# Patient Record
Sex: FEMALE | Race: BLACK OR AFRICAN AMERICAN | NOT HISPANIC OR LATINO | Employment: FULL TIME | URBAN - METROPOLITAN AREA
[De-identification: names, ages, dates, MRNs, and addresses within clinical notes are randomized per-mention and may not be internally consistent; named-entity substitution may affect disease eponyms.]

---

## 2023-05-19 LAB
ABO GROUP (TYPE) IN BLOOD: NORMAL
ANION GAP IN SER/PLAS: 13 MMOL/L (ref 10–20)
ANTIBODY SCREEN: NORMAL
CALCIUM (MG/DL) IN SER/PLAS: 9.3 MG/DL (ref 8.6–10.6)
CARBON DIOXIDE, TOTAL (MMOL/L) IN SER/PLAS: 25 MMOL/L (ref 21–32)
CHLORIDE (MMOL/L) IN SER/PLAS: 108 MMOL/L (ref 98–107)
CREATININE (MG/DL) IN SER/PLAS: 0.95 MG/DL (ref 0.5–1.05)
ERYTHROCYTE DISTRIBUTION WIDTH (RATIO) BY AUTOMATED COUNT: 13 % (ref 11.5–14.5)
ERYTHROCYTE MEAN CORPUSCULAR HEMOGLOBIN CONCENTRATION (G/DL) BY AUTOMATED: 33.2 G/DL (ref 32–36)
ERYTHROCYTE MEAN CORPUSCULAR VOLUME (FL) BY AUTOMATED COUNT: 88 FL (ref 80–100)
ERYTHROCYTES (10*6/UL) IN BLOOD BY AUTOMATED COUNT: 4.29 X10E12/L (ref 4–5.2)
GFR FEMALE: 82 ML/MIN/1.73M2
GLUCOSE (MG/DL) IN SER/PLAS: 85 MG/DL (ref 74–99)
HEMATOCRIT (%) IN BLOOD BY AUTOMATED COUNT: 37.7 % (ref 36–46)
HEMOGLOBIN (G/DL) IN BLOOD: 12.5 G/DL (ref 12–16)
INR IN PPP BY COAGULATION ASSAY: 1 (ref 0.9–1.1)
LEUKOCYTES (10*3/UL) IN BLOOD BY AUTOMATED COUNT: 6.6 X10E9/L (ref 4.4–11.3)
NRBC (PER 100 WBCS) BY AUTOMATED COUNT: 0 /100 WBC (ref 0–0)
PLATELETS (10*3/UL) IN BLOOD AUTOMATED COUNT: 394 X10E9/L (ref 150–450)
POTASSIUM (MMOL/L) IN SER/PLAS: 4.1 MMOL/L (ref 3.5–5.3)
PROTHROMBIN TIME (PT) IN PPP BY COAGULATION ASSAY: 11.8 SEC (ref 9.8–13.4)
RH FACTOR: NORMAL
SODIUM (MMOL/L) IN SER/PLAS: 142 MMOL/L (ref 136–145)
UREA NITROGEN (MG/DL) IN SER/PLAS: 10 MG/DL (ref 6–23)

## 2023-05-23 ENCOUNTER — HOSPITAL ENCOUNTER (OUTPATIENT)
Dept: DATA CONVERSION | Facility: HOSPITAL | Age: 33
End: 2023-05-23
Attending: SURGERY | Admitting: SURGERY
Payer: COMMERCIAL

## 2023-05-23 DIAGNOSIS — Z85.3 PERSONAL HISTORY OF MALIGNANT NEOPLASM OF BREAST: ICD-10-CM

## 2023-05-23 DIAGNOSIS — E66.9 OBESITY, UNSPECIFIED: ICD-10-CM

## 2023-05-23 DIAGNOSIS — K21.9 GASTRO-ESOPHAGEAL REFLUX DISEASE WITHOUT ESOPHAGITIS: ICD-10-CM

## 2023-05-23 DIAGNOSIS — Z42.1 ENCOUNTER FOR BREAST RECONSTRUCTION FOLLOWING MASTECTOMY: ICD-10-CM

## 2023-05-23 DIAGNOSIS — G47.33 OBSTRUCTIVE SLEEP APNEA (ADULT) (PEDIATRIC): ICD-10-CM

## 2023-06-02 LAB
COMPLETE PATHOLOGY REPORT: NORMAL
CONVERTED CLINICAL DIAGNOSIS-HISTORY: NORMAL
CONVERTED FINAL DIAGNOSIS: NORMAL
CONVERTED FINAL REPORT PDF LINK TO COPY AND PASTE: NORMAL
CONVERTED GROSS DESCRIPTION: NORMAL

## 2023-09-07 VITALS — BODY MASS INDEX: 36.35 KG/M2 | HEIGHT: 62 IN | WEIGHT: 197.53 LBS

## 2023-09-30 NOTE — H&P
"    History & Physical Reviewed:   Pregnant/Lactating:  ·  Are You Pregnant no (1)   ·  Are You Currently Breastfeeding no (1)     I have reviewed the History and Physical dated:  23-May-2023   History and Physical reviewed and relevant findings noted. Patient examined to review pertinent physical  findings.: No significant changes   Home Medications Reviewed: no changes noted   Allergies Reviewed: no changes noted       ERAS (Enhanced Recovery After Surgery):  ·  ERAS Patient: no     Consent:   COVID-19 Consent:  ·  COVID-19 Risk Consent Surgeon has reviewed key risks related to the risk of eulalio COVID-19 and if they contract COVID-19 what the risks are.       Electronic Signatures:  Galileo Barker)  (Signed 23-May-2023 08:14)   Authored: History & Physical Reviewed, ERAS, Consent,  Note Completion      Last Updated: 23-May-2023 08:14 by Galileo Barker)    References:  1.  Data Referenced From \"Patient Profile - Preop v3\" 22-May-2023 12:03   "

## 2023-10-02 NOTE — OP NOTE
PROCEDURE DETAILS    Preoperative Diagnosis:  Other specified postprocedural state, Z98.890  Encounter for cosmetic surgery, Z41.1    Postoperative Diagnosis:  Other specified postprocedural state, Z98.890  Encounter for cosmetic surgery, Z41.1    Surgeon: Galileo Barker  Resident/Fellow/Other Assistant: Roslyn Bermudez    Procedure:  1.  Bilateral second stage breast reconstruction IDEAL IMPLANT, structured saline breast implant, 610 cc  2.  Bilateral capsulotomy and capsulorrhaphy  3.  Bilateral, 14 x 2 cm abdominal adjacent tissue transfer to reconstruct mammary fold which had descended  4.  Liposuction of bilateral flanks with injection of 160 cc per side into the subcutaneous plane, total 320 cc  5.  Cosmetic abdominoplasty with rectus plication and umbilical transposition  6.  Bilateral multiple level intercostal nerve blocks, for postoperative analgesia not for intraoperative pain control  Anesthesia: Elliott Manzanares  Estimated Blood Loss: 200  Findings: Punctured and deflated left breast tissue expander  Additional Details:       No qualified resident was available and Roslyn Bermudez served as my full and primary assistant for the entire case.      Implants:  Right = 610 cc  IDEAL IMPLANT Structured Breast Implant, S/N: [id number]  Empty Implant Saline = 94 cc  Back-Inner Saline = 346 cc  Front-Outer Lumen Saline = 170cc  Total Implant Volume = 610cc    Left= 610 cc  IDEAL IMPLANT Structured Breast Implant, S/N: [id number]  Empty Implant Saline = 94 cc  Back-Inner Saline = 346 cc  Front-Outer Lumen Saline = 170cc  Total Implant Volume = 610cc      Total amount of tumescent = 2 L  Total amount lipoaspirate = 1 L  Total amount of fat grafted 320 cc        Operative Report:     INDICATION  Kirsty is status post bilateral Ruiz pattern mastectomy, with immediate bilateral prepectoral tissue expander (480-575 cc) and adjacent tissue transfer inferior dermoglandular flaps, performed on July 14, 2022.  The total  fill to date:  Right: 120 cc + 100cc +150 cc 120cc 120 cc = 610 cc  Left: 120 cc + 100cc +150 cc 120cc 120 cc = 610 cc + 150cc = 750cc Plan of Care: -follow up in 1 month to re-discuss abdominoplasty and re-check tissue expanders. She remains interested in moving forward with second stage and performing a cosmetic abdominoplasty  at the same time. She has done an excellent job of losing weight, and I believe we can move forward with.    Previously we performed tissue expansion of approximate 150 cc to gain symmetry, the concern is that she probably does have a silent or slow rupture but she was happy with the right, larger, size and so we performed a fill today to achieve symmetry.    The patient is indicated for the above-stated procedure.       INFORMED CONSENT  Patient was told the risks, benefits, INDICATIONS, contraindications, and alternatives to the procedure. Risks include but not limited to pain, infection, bleeding, hematoma, seroma, injury to neurovascular and tendinous structures, imperfect cosmesis,  umbilical hypoperfusion and loss, asymmetric scar, implant rupture, and need for subsequent surgeries.     The patient demonstrated understanding of these risks and agreed to proceed with surgery.   Advance directives discussed.  Team approach explained.      The patient consented and wished to proceed with the procedure and for medical photography if needed.     PROCEDURAL PAUSE  Prior to the beginning of the procedure, the patient's correct identity, side, site, and procedure to be performed were verified.  The patient was given intravenous antibiotics prior to skin incision.     PROCEDURAL NOTE  Kirsyt was brought to the operative theater and was transferred operating room table.  All bony prominences were well padded, and sequential compression devices were placed to each lower extremity. Patient was then secured with safety straps.    The patient was then placed under IV sedation, and our anesthesia  colleagues administered general endotracheal anesthesia.    We began by marking a abdominoplasty which was marked in the preoperative area.  We marked the transverse incision line 7.5 cm cephalad to the vaginal introitus under stretch.  We then placed tumescent into the bilateral flanks.  We allowed this to sit  and we turned attention to the right breast.  We made a 8 cm incision in the previous IMF incision.  We did this with a 15 blade scalpel.  We dissected out with unipolar cautery until he identified and into the capsule.  We then examined the capsule,  we performed capsulotomy capsulorrhaphy with a 610 cc sizer and placed on the right side, the IMF had descended, and so we performed 14 x 2 cm of adjacent tissue transfer and an advancement from the abdominal area in order to tack the IMF.  We did so  with 0 PDS into the periosteum of the ribs.  We then injected 20 cc of Marcaine mixed with Exparel mixed with normal saline into multiple intercostal nerve at the rib periosteum for postoperative analgesia not for intraoperative pain control.  After this  was completed we performed capsulotomy and capsulorrhaphy as mentioned using popcorn capsulorrhaphy technique and 0 PDS suture.    We then turned our attention to the left side, identical IMF incision was made, the dissected down with unipolar cautery, entered the capsule, and identified a implant which was ruptured and approximately 5 tiny puncture areas which were leaking.  This  caused significant retraction of the capsule, and required significant capsulotomy capsulorrhaphy and procedural service in order to accommodate a 610 cc implant.  We performed this capsulotomy capsulorrhaphy and a popcorn capsulorrhaphy technique, we  widen the pocket laterally and secured this with a 0 PDS suture.  We then performed 14 x 2 cm of advancement of the abdominal tissue and an adjacent tissue transfer, securing it to the rib periosteum with 0 PDS suture.  We then performed  injection of  20 cc of Exparel solution into the multiple intercostal nerves for postoperative analgesia not for intraoperative pain control.    We then performed bilateral liposuction of flanks, we harvested 1 L of Lipo aspirate, using the PureGraft system, we then, according to 's instructions performed rinsing, decanting and removal of your fat, we harvested approximately 400 cc  for injection.  We set this aside.    We then turned our attention to performing abdominoplasty.  We made transverse inferior incision with a 10 blade scalpel, we dissected laterally down at the level of the ASIS and to identify the anterior abdominal wall.  We then began to dissect medially  we identified the SI EV's and clipped them bilaterally.  We took significant time and increased procedural service medially through multiple areas of scar in order to confirm our plane.  We then dissected cephalad, split the abdominal panniculus and began  to dissect out the umbilicus keeping a wide swath of periumbilical fat to incorporate as many perforators as possible.  We then dissected cephalad and a pyramidal fashion to we reached the xiphoid.  We identified a 4 cm rectus diastases.  We performed  rectus plication using interrupted buried figure-of-eight 2-0 Ethibond suture. We then oversewed this with a running 2-0 STRATAFIX suture.  We placed 2, 15 Chinese MARII drains exiting lateral to the incisions.  These were secured with 0 silk suture.  We then set the patient in a reflex position, marked the amount of abdominal apron to be excised, and laid the patient back reperformed to close hemostasis with unipolar and bipolar cautery.  We performed tap blocks with the remaining Exparel solution.   We then set the patient upright and to reflex position, we removed all of the excess skin and subcutaneous tissue with a 10 blade scalpel and unipolar cautery.  We provisionally closed with staples, we used bimanual technique to husam the new  level of  the umbilicus with a kimberly shape pattern marked in the skin.  We then performed closure from midline to the ASIS with buried simple 0 PDS suture in Chuck's.  We then ran 2-0 STRATAFIX suture in the camper's fascia from midline to the lateral  extent of the incision, we turned back and ran this in the deep dermal layer.  We then ran a 3-0 STRATAFIX in the subcuticular layer.  The drains were set to bulb suction, we irrigated the drain tubing with 120 cc of Irrisept solution and allowed this to dwell before starting to bulb suction.  The umbilicus was cut out with  an 11 blade scalpel and bipolar cautery.  The umbilicus was then inset with deep 3-0 Monocryl suture followed by running half.  Mattress 4-0 plain gut suture, Xeroform was then placed into the umbilicus followed by Tegaderm.  Dermabond and Prineo were  placed over the transverse incisions.    We then turned attention to completing the breast reconstruction, we used cannula through the IMF incisions to inject 120 cc of fat into the subcutaneous plane taking care to inject 10 cc over approximately 10 cm, and a crosshatch pattern.  After this  was completed we irrigated with Betadine and then irrigated Irrisept solution.  We prepped the skin with Betadine, and we placed the ideal implants into the prepectoral space, we had filled the back movements before placing, and then filled the front/outer  lumens with 2 170 cc on the prepectoral space.  For a total of 610 cc.  We then closed the capsules with interrupted 2-0 Vicryl suture, we closed the fascia with 2-0 PDS suture.  We then closed the IMF incision with running 3-0 STRATAFIX suture  in the deep dermis which was turned back and run in the subcuticular plane.  Dermabond and Prineo were then placed over the incisions.  Surgical bra was placed, ABDs were placed over the incisions and abdominal binder was placed.      The patient tolerated the procedure well and was awakened from anesthesia  without any difficulties, was transferred to the patient in stable condition, all needle counts were correct.    POST OP PLAN:  Kirsty will remain an outpatient.  She is instructed to empty, strip, record the drain outputs twice daily, she is instructed to shower daily.   We will see her back for follow-up in 1 week.                          Electronic Signatures:  Galileo Barker)  (Signed 23-May-2023 14:20)   Authored: Post-Operative Note, Chart Review, Note Completion      Last Updated: 23-May-2023 14:20 by Galileo Barker)

## 2023-10-12 PROBLEM — B35.3 TINEA PEDIS OF BOTH FEET: Status: ACTIVE | Noted: 2023-10-12

## 2023-10-12 PROBLEM — R05.9 COUGH: Status: ACTIVE | Noted: 2023-10-12

## 2023-10-12 PROBLEM — R51.9 HEADACHE: Status: ACTIVE | Noted: 2023-10-12

## 2023-10-12 PROBLEM — E55.9 VITAMIN D DEFICIENCY: Status: ACTIVE | Noted: 2023-10-12

## 2023-10-12 PROBLEM — G62.9 NEUROPATHY: Status: ACTIVE | Noted: 2023-10-12

## 2023-10-12 PROBLEM — T88.8XXA FLUID COLLECTION AT SURGICAL SITE: Status: ACTIVE | Noted: 2023-10-12

## 2023-10-12 PROBLEM — L73.2 HIDRADENITIS SUPPURATIVA OF LEFT AXILLA: Status: ACTIVE | Noted: 2023-10-12

## 2023-10-12 PROBLEM — R10.814 LLQ ABDOMINAL TENDERNESS: Status: ACTIVE | Noted: 2023-10-12

## 2023-10-12 PROBLEM — R19.00 PELVIC MASS IN FEMALE: Status: ACTIVE | Noted: 2023-10-12

## 2023-10-12 PROBLEM — M54.16 CHRONIC LUMBAR RADICULOPATHY: Status: ACTIVE | Noted: 2023-10-12

## 2023-10-12 PROBLEM — M62.838 MUSCLE SPASM: Status: ACTIVE | Noted: 2023-10-12

## 2023-10-12 PROBLEM — D27.9 OVARIAN CYSTADENOMA: Status: ACTIVE | Noted: 2023-10-12

## 2023-10-12 PROBLEM — K21.9 ESOPHAGEAL REFLUX: Status: ACTIVE | Noted: 2023-10-12

## 2023-10-12 PROBLEM — E66.9 OBESITY: Status: ACTIVE | Noted: 2023-10-12

## 2023-10-12 PROBLEM — R10.2 FEMALE PELVIC PAIN: Status: ACTIVE | Noted: 2023-10-12

## 2023-10-12 PROBLEM — G47.19 EXCESSIVE DAYTIME SLEEPINESS: Status: ACTIVE | Noted: 2023-10-12

## 2023-10-12 PROBLEM — Z97.5 IUD CONTRACEPTION: Status: ACTIVE | Noted: 2023-10-12

## 2023-10-12 PROBLEM — G47.33 OSA (OBSTRUCTIVE SLEEP APNEA): Status: ACTIVE | Noted: 2023-10-12

## 2023-10-12 PROBLEM — H92.02 LEFT EAR PAIN: Status: ACTIVE | Noted: 2023-10-12

## 2023-10-12 PROBLEM — N76.0 BACTERIAL VAGINOSIS: Status: ACTIVE | Noted: 2023-10-12

## 2023-10-12 PROBLEM — H93.13 TINNITUS OF BOTH EARS: Status: ACTIVE | Noted: 2023-10-12

## 2023-10-12 PROBLEM — S86.911A KNEE STRAIN, RIGHT, INITIAL ENCOUNTER: Status: ACTIVE | Noted: 2023-10-12

## 2023-10-12 PROBLEM — M25.511 RIGHT SHOULDER PAIN: Status: ACTIVE | Noted: 2023-10-12

## 2023-10-12 PROBLEM — M25.551 RIGHT HIP PAIN: Status: ACTIVE | Noted: 2023-10-12

## 2023-10-12 PROBLEM — R35.0 URINARY FREQUENCY: Status: ACTIVE | Noted: 2023-10-12

## 2023-10-12 PROBLEM — R20.0 NUMBNESS: Status: ACTIVE | Noted: 2023-10-12

## 2023-10-12 PROBLEM — N64.4 BREAST PAIN: Status: ACTIVE | Noted: 2023-10-12

## 2023-10-12 PROBLEM — G56.00 CARPAL TUNNEL SYNDROME: Status: ACTIVE | Noted: 2023-10-12

## 2023-10-12 PROBLEM — D22.9 NEVUS: Status: ACTIVE | Noted: 2023-10-12

## 2023-10-12 PROBLEM — F32.A DEPRESSION: Status: ACTIVE | Noted: 2023-10-12

## 2023-10-12 PROBLEM — N92.6 IRREGULAR MENSTRUAL CYCLE: Status: ACTIVE | Noted: 2023-10-12

## 2023-10-12 PROBLEM — J02.9 ACUTE PHARYNGITIS: Status: ACTIVE | Noted: 2023-10-12

## 2023-10-12 PROBLEM — G47.00 INSOMNIA: Status: ACTIVE | Noted: 2023-10-12

## 2023-10-12 PROBLEM — R00.2 PALPITATIONS: Status: ACTIVE | Noted: 2023-10-12

## 2023-10-12 PROBLEM — G89.18 ACUTE POST-OPERATIVE PAIN: Status: ACTIVE | Noted: 2023-10-12

## 2023-10-12 PROBLEM — F41.9 ANXIETY: Status: ACTIVE | Noted: 2023-10-12

## 2023-10-12 PROBLEM — B96.89 BACTERIAL VAGINOSIS: Status: ACTIVE | Noted: 2023-10-12

## 2023-10-12 PROBLEM — R45.4 IRRITABILITY: Status: ACTIVE | Noted: 2023-10-12

## 2023-10-17 ENCOUNTER — OFFICE VISIT (OUTPATIENT)
Dept: PRIMARY CARE | Facility: CLINIC | Age: 33
End: 2023-10-17
Payer: COMMERCIAL

## 2023-10-17 ENCOUNTER — LAB (OUTPATIENT)
Dept: LAB | Facility: LAB | Age: 33
End: 2023-10-17
Payer: COMMERCIAL

## 2023-10-17 VITALS
HEIGHT: 62 IN | WEIGHT: 214.6 LBS | OXYGEN SATURATION: 96 % | HEART RATE: 81 BPM | BODY MASS INDEX: 39.49 KG/M2 | RESPIRATION RATE: 18 BRPM | SYSTOLIC BLOOD PRESSURE: 110 MMHG | DIASTOLIC BLOOD PRESSURE: 74 MMHG

## 2023-10-17 DIAGNOSIS — Z85.3 HISTORY OF MALIGNANT NEOPLASM OF BREAST: ICD-10-CM

## 2023-10-17 DIAGNOSIS — Z00.00 HEALTHCARE MAINTENANCE: Primary | ICD-10-CM

## 2023-10-17 DIAGNOSIS — Z00.00 HEALTHCARE MAINTENANCE: ICD-10-CM

## 2023-10-17 PROBLEM — R53.83 FATIGUE: Status: ACTIVE | Noted: 2023-07-19

## 2023-10-17 PROBLEM — B35.1 ONYCHOMYCOSIS OF TOENAIL: Status: ACTIVE | Noted: 2023-07-19

## 2023-10-17 LAB
ALBUMIN SERPL BCP-MCNC: 4.2 G/DL (ref 3.4–5)
ALP SERPL-CCNC: 88 U/L (ref 33–110)
ALT SERPL W P-5'-P-CCNC: 15 U/L (ref 7–45)
ANION GAP SERPL CALC-SCNC: 11 MMOL/L (ref 10–20)
AST SERPL W P-5'-P-CCNC: 14 U/L (ref 9–39)
BASOPHILS # BLD AUTO: 0.03 X10*3/UL (ref 0–0.1)
BASOPHILS NFR BLD AUTO: 0.3 %
BILIRUB SERPL-MCNC: 0.5 MG/DL (ref 0–1.2)
BUN SERPL-MCNC: 9 MG/DL (ref 6–23)
CALCIUM SERPL-MCNC: 9.8 MG/DL (ref 8.6–10.6)
CHLORIDE SERPL-SCNC: 107 MMOL/L (ref 98–107)
CHOLEST SERPL-MCNC: 175 MG/DL (ref 0–199)
CHOLESTEROL/HDL RATIO: 3.2
CO2 SERPL-SCNC: 26 MMOL/L (ref 21–32)
CREAT SERPL-MCNC: 0.7 MG/DL (ref 0.5–1.05)
EOSINOPHIL # BLD AUTO: 0.17 X10*3/UL (ref 0–0.7)
EOSINOPHIL NFR BLD AUTO: 2 %
ERYTHROCYTE [DISTWIDTH] IN BLOOD BY AUTOMATED COUNT: 14.1 % (ref 11.5–14.5)
EST. AVERAGE GLUCOSE BLD GHB EST-MCNC: 100 MG/DL
GFR SERPL CREATININE-BSD FRML MDRD: >90 ML/MIN/1.73M*2
GLUCOSE SERPL-MCNC: 103 MG/DL (ref 74–99)
HBA1C MFR BLD: 5.1 %
HCT VFR BLD AUTO: 41.7 % (ref 36–46)
HDLC SERPL-MCNC: 54 MG/DL
HGB BLD-MCNC: 13.9 G/DL (ref 12–16)
IMM GRANULOCYTES # BLD AUTO: 0.04 X10*3/UL (ref 0–0.7)
IMM GRANULOCYTES NFR BLD AUTO: 0.5 % (ref 0–0.9)
LDLC SERPL CALC-MCNC: 100 MG/DL
LYMPHOCYTES # BLD AUTO: 2.35 X10*3/UL (ref 1.2–4.8)
LYMPHOCYTES NFR BLD AUTO: 27.1 %
MCH RBC QN AUTO: 29.3 PG (ref 26–34)
MCHC RBC AUTO-ENTMCNC: 33.3 G/DL (ref 32–36)
MCV RBC AUTO: 88 FL (ref 80–100)
MONOCYTES # BLD AUTO: 0.45 X10*3/UL (ref 0.1–1)
MONOCYTES NFR BLD AUTO: 5.2 %
NEUTROPHILS # BLD AUTO: 5.64 X10*3/UL (ref 1.2–7.7)
NEUTROPHILS NFR BLD AUTO: 64.9 %
NON HDL CHOLESTEROL: 121 MG/DL (ref 0–149)
NRBC BLD-RTO: 0 /100 WBCS (ref 0–0)
PLATELET # BLD AUTO: 438 X10*3/UL (ref 150–450)
PMV BLD AUTO: 10 FL (ref 7.5–11.5)
POTASSIUM SERPL-SCNC: 4.2 MMOL/L (ref 3.5–5.3)
PROT SERPL-MCNC: 7.2 G/DL (ref 6.4–8.2)
RBC # BLD AUTO: 4.75 X10*6/UL (ref 4–5.2)
SODIUM SERPL-SCNC: 140 MMOL/L (ref 136–145)
TRIGL SERPL-MCNC: 103 MG/DL (ref 0–149)
TSH SERPL-ACNC: 0.78 MIU/L (ref 0.44–3.98)
VLDL: 21 MG/DL (ref 0–40)
WBC # BLD AUTO: 8.7 X10*3/UL (ref 4.4–11.3)

## 2023-10-17 PROCEDURE — 1036F TOBACCO NON-USER: CPT | Performed by: INTERNAL MEDICINE

## 2023-10-17 PROCEDURE — 84443 ASSAY THYROID STIM HORMONE: CPT

## 2023-10-17 PROCEDURE — 85025 COMPLETE CBC W/AUTO DIFF WBC: CPT

## 2023-10-17 PROCEDURE — 80053 COMPREHEN METABOLIC PANEL: CPT

## 2023-10-17 PROCEDURE — 36415 COLL VENOUS BLD VENIPUNCTURE: CPT

## 2023-10-17 PROCEDURE — 83036 HEMOGLOBIN GLYCOSYLATED A1C: CPT

## 2023-10-17 PROCEDURE — 3008F BODY MASS INDEX DOCD: CPT | Performed by: INTERNAL MEDICINE

## 2023-10-17 PROCEDURE — 80061 LIPID PANEL: CPT

## 2023-10-17 PROCEDURE — 99214 OFFICE O/P EST MOD 30 MIN: CPT | Performed by: INTERNAL MEDICINE

## 2023-10-17 RX ORDER — LEVONORGESTREL 52 MG/1
1 INTRAUTERINE DEVICE INTRAUTERINE
COMMUNITY
Start: 2018-02-21

## 2023-10-17 RX ORDER — TAMOXIFEN CITRATE 20 MG/1
1 TABLET ORAL DAILY
COMMUNITY
Start: 2022-08-18 | End: 2024-03-08 | Stop reason: ALTCHOICE

## 2023-10-17 ASSESSMENT — ENCOUNTER SYMPTOMS
SHORTNESS OF BREATH: 0
NECK PAIN: 0
WEAKNESS: 0
HEADACHES: 0
VOMITING: 0
BLOOD IN STOOL: 0
FREQUENCY: 0
FEVER: 0
MYALGIAS: 0
NECK STIFFNESS: 0
DIFFICULTY URINATING: 0
WHEEZING: 0
FATIGUE: 0
DYSURIA: 0
HEMATURIA: 0
DIARRHEA: 0
APPETITE CHANGE: 0
BACK PAIN: 0
DIZZINESS: 0
JOINT SWELLING: 0
NUMBNESS: 0
CHILLS: 0
NAUSEA: 0
ABDOMINAL PAIN: 0
ARTHRALGIAS: 0
LIGHT-HEADEDNESS: 0
RHINORRHEA: 0
ABDOMINAL DISTENTION: 0
PALPITATIONS: 0
SINUS PRESSURE: 0
SORE THROAT: 0
DIAPHORESIS: 0
COUGH: 0
CONSTIPATION: 0

## 2023-10-17 NOTE — ASSESSMENT & PLAN NOTE
Currently on tamoxifen 20 mg daily but reports not taking it as prescribed.  Keep follow-up with breast surgery/ oncology.

## 2023-10-17 NOTE — PROGRESS NOTES
"Subjective   Patient ID: Kirsty Shrestha is a 33 y.o. female who presents for [facility].    HPI   She is a 34 yo female with a PMHx of breast cancer [estrogen receptor/progesterone receptor positive, HER2 negative on tamoxifen] status post bilateral mastectomy followed by breast reconstructive surgery in May 2023.  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  She presents to [facility].  She is doing well generally, moods are normal.    Review of Systems   Constitutional:  Negative for appetite change, chills, diaphoresis, fatigue and fever.   HENT:  Negative for congestion, ear discharge, ear pain, hearing loss, postnasal drip, rhinorrhea, sinus pressure, sore throat and tinnitus.    Eyes:  Negative for visual disturbance.   Respiratory:  Negative for cough, shortness of breath and wheezing.    Cardiovascular:  Negative for chest pain, palpitations and leg swelling.   Gastrointestinal:  Negative for abdominal distention, abdominal pain, blood in stool, constipation, diarrhea, nausea and vomiting.   Genitourinary:  Negative for decreased urine volume, difficulty urinating, dysuria, frequency, hematuria and urgency.   Musculoskeletal:  Negative for arthralgias, back pain, gait problem, joint swelling, myalgias, neck pain and neck stiffness.   Skin:  Negative for rash.   Neurological:  Negative for dizziness, weakness, light-headedness, numbness and headaches.       Objective   /74 Comment: ida bp  Pulse 81   Resp 18   Ht 1.575 m (5' 2\")   Wt 97.3 kg (214 lb 9.6 oz)   SpO2 96%   BMI 39.25 kg/m²     Physical Exam  Vitals reviewed.   Constitutional:       Appearance: Normal appearance. She is normal weight.   HENT:      Right Ear: Tympanic membrane and external ear normal.      Left Ear: Tympanic membrane and external ear normal.   Eyes:      Extraocular Movements: Extraocular movements intact.      Conjunctiva/sclera: Conjunctivae normal.      Pupils: Pupils " are equal, round, and reactive to light.   Cardiovascular:      Rate and Rhythm: Normal rate and regular rhythm.      Pulses: Normal pulses.   Pulmonary:      Effort: Pulmonary effort is normal.      Breath sounds: Normal breath sounds.   Abdominal:      General: Bowel sounds are normal.      Palpations: Abdomen is soft.   Musculoskeletal:         General: Normal range of motion.      Cervical back: Normal range of motion.   Skin:     General: Skin is warm and dry.   Neurological:      General: No focal deficit present.      Mental Status: She is oriented to person, place, and time.   Psychiatric:         Mood and Affect: Mood normal.         Behavior: Behavior normal.         Assessment/Plan   Problem List Items Addressed This Visit             ICD-10-CM    History of malignant neoplasm of breast Z85.3     Currently on tamoxifen 20 mg daily but reports not taking it as prescribed.  Keep follow-up with breast surgery/ oncology.           Other Visit Diagnoses         Codes    Healthcare maintenance    -  Primary Z00.00    Relevant Orders    CBC and Auto Differential    Comprehensive Metabolic Panel    Hemoglobin A1C    Lipid Panel    TSH with reflex to Free T4 if abnormal    Referral to Gynecology        RTC in 6 mo, sooner if needed

## 2023-12-18 ENCOUNTER — OFFICE VISIT (OUTPATIENT)
Dept: PLASTIC SURGERY | Facility: CLINIC | Age: 33
End: 2023-12-18
Payer: COMMERCIAL

## 2023-12-18 VITALS
SYSTOLIC BLOOD PRESSURE: 120 MMHG | HEART RATE: 70 BPM | HEIGHT: 62 IN | DIASTOLIC BLOOD PRESSURE: 65 MMHG | WEIGHT: 211 LBS | BODY MASS INDEX: 38.83 KG/M2

## 2023-12-18 DIAGNOSIS — N60.02 BENIGN CYST OF LEFT BREAST: Primary | ICD-10-CM

## 2023-12-18 PROCEDURE — 3008F BODY MASS INDEX DOCD: CPT | Performed by: PHYSICIAN ASSISTANT

## 2023-12-18 PROCEDURE — 99213 OFFICE O/P EST LOW 20 MIN: CPT | Performed by: PHYSICIAN ASSISTANT

## 2023-12-18 PROCEDURE — 1036F TOBACCO NON-USER: CPT | Performed by: PHYSICIAN ASSISTANT

## 2023-12-18 NOTE — PROGRESS NOTES
Department of Plastic and Reconstructive Surgery            Follow Up Visit    Date: 12/18/23  Date of Surgery: 5/23/25    Subjective   Kirsty Shrestha is a 33 y.o. female who presents for POV. They are s/p bilateral stage 2 breast recosntruction with removal of TE and placement of new IDEA: saline implants 610cc with autologous fat grafting of 160cc per breast and abdominoplasty with rectus plication and umbilical transposition on 5/23/23 with Dr. Barker.       She presents for follow up visit. She has concerns for cyst on her left breast, she states that this is non-tender but endorses that this is lump becomes apparent through the ski and when she wears clothing. She additionally notes hollowing of the superior medial aspect of the breast and would like to undergo fat grafting.         Objective   Vital Signs:   Vitals:    12/18/23 1100   BP: 120/65   Pulse: 70     Gen: interactive and pleasant  Head: NCAT  Eyes: EOMI, PERRLA  Mouth: MMM  Throat: trachea midline  Cor: RRR  Pulm: nonlabored breathing  Abd: s/nt/nd  Neuro: AAOx3  Ext: extremities perfused    Focused exam of the: breast    B/L breasts with excellent symmetry. There is mild superior medial hollowing noted. Left breast has large cyst like lesion at the superior portion of the breast, this is rubbery and mobile. Non-tender to palpation. Likely fat necrosis. Surgical scars are well healed.     Assessment/Plan     Kirsty Shrestha is a 33 y.o. female who presents for POV. They are s/p bilateral stage 2 breast recosntruction with removal of TE and placement of new IDEA: saline implants 610cc with autologous fat grafting of 160cc per breast and abdominoplasty with rectus plication and umbilical transposition on 5/23/23 with Dr. Barker.       She presents today for follow up visit. She has complaints of mild superior medial hollowing. She was seen by myself and Dr. Barker today. We discussed fat grafting to the B/l breasts. She  likely has an oil cyst from previous liposuction of the left breast, we will order US to further evaluate this left breast mass. We discussed possible liposuction of the cyst for removal.      Plan:   US left breast for cyst  Follow up after US with Dr. Mj SNYDER spent 20 minutes with this patient. Greater than 50% of this time was spent in the counselling and/or coordination of care of this patient.  This note was created using voice recognition software and was not corrected for typographical or grammatical errors.    Signature: Roslyn Bermudez PA-C  Date: 12/18/23

## 2023-12-21 ENCOUNTER — ANCILLARY PROCEDURE (OUTPATIENT)
Dept: RADIOLOGY | Facility: CLINIC | Age: 33
End: 2023-12-21
Payer: COMMERCIAL

## 2023-12-21 ENCOUNTER — OFFICE VISIT (OUTPATIENT)
Dept: SURGICAL ONCOLOGY | Facility: CLINIC | Age: 33
End: 2023-12-21
Payer: COMMERCIAL

## 2023-12-21 VITALS
HEART RATE: 67 BPM | SYSTOLIC BLOOD PRESSURE: 136 MMHG | WEIGHT: 211 LBS | BODY MASS INDEX: 38.83 KG/M2 | HEIGHT: 62 IN | DIASTOLIC BLOOD PRESSURE: 87 MMHG

## 2023-12-21 DIAGNOSIS — R92.8 ABNORMAL FINDING ON BREAST IMAGING: Primary | ICD-10-CM

## 2023-12-21 DIAGNOSIS — N60.02 BENIGN CYST OF LEFT BREAST: ICD-10-CM

## 2023-12-21 PROCEDURE — 3008F BODY MASS INDEX DOCD: CPT | Performed by: NURSE PRACTITIONER

## 2023-12-21 PROCEDURE — 1036F TOBACCO NON-USER: CPT | Performed by: NURSE PRACTITIONER

## 2023-12-21 PROCEDURE — 99214 OFFICE O/P EST MOD 30 MIN: CPT | Performed by: NURSE PRACTITIONER

## 2023-12-21 PROCEDURE — 76982 USE 1ST TARGET LESION: CPT | Mod: LT

## 2023-12-21 PROCEDURE — 76642 ULTRASOUND BREAST LIMITED: CPT | Mod: LT

## 2023-12-21 PROCEDURE — 76642 ULTRASOUND BREAST LIMITED: CPT | Mod: LEFT SIDE | Performed by: STUDENT IN AN ORGANIZED HEALTH CARE EDUCATION/TRAINING PROGRAM

## 2023-12-21 RX ORDER — FEXOFENADINE HCL AND PSEUDOEPHEDRINE HCI 180; 240 MG/1; MG/1
1 TABLET, EXTENDED RELEASE ORAL DAILY
COMMUNITY

## 2023-12-21 ASSESSMENT — PAIN SCALES - GENERAL: PAINLEVEL: 0-NO PAIN
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
## 2023-12-22 ENCOUNTER — ANCILLARY PROCEDURE (OUTPATIENT)
Dept: RADIOLOGY | Facility: CLINIC | Age: 33
End: 2023-12-22
Payer: COMMERCIAL

## 2023-12-22 DIAGNOSIS — R92.8 ABNORMAL FINDING ON BREAST IMAGING: ICD-10-CM

## 2023-12-22 DIAGNOSIS — R92.8 ABNORMAL SCREENING MAMMOGRAM: Primary | ICD-10-CM

## 2023-12-22 DIAGNOSIS — R92.8 OTHER ABNORMAL AND INCONCLUSIVE FINDINGS ON DIAGNOSTIC IMAGING OF BREAST: ICD-10-CM

## 2023-12-22 PROCEDURE — 19083 BX BREAST 1ST LESION US IMAG: CPT | Mod: LEFT SIDE | Performed by: RADIOLOGY

## 2023-12-22 PROCEDURE — 77065 DX MAMMO INCL CAD UNI: CPT | Mod: LT

## 2023-12-22 PROCEDURE — 88360 TUMOR IMMUNOHISTOCHEM/MANUAL: CPT | Performed by: PATHOLOGY

## 2023-12-22 PROCEDURE — 76942 ECHO GUIDE FOR BIOPSY: CPT | Mod: 52,LT

## 2023-12-22 PROCEDURE — 88305 TISSUE EXAM BY PATHOLOGIST: CPT | Mod: TC,AHULAB | Performed by: NURSE PRACTITIONER

## 2023-12-22 PROCEDURE — 19083 BX BREAST 1ST LESION US IMAG: CPT | Mod: LT

## 2023-12-22 PROCEDURE — 2720000007 HC OR 272 NO HCPCS

## 2023-12-22 PROCEDURE — 96372 THER/PROPH/DIAG INJ SC/IM: CPT | Performed by: RADIOLOGY

## 2023-12-22 PROCEDURE — 2500000005 HC RX 250 GENERAL PHARMACY W/O HCPCS: Performed by: RADIOLOGY

## 2023-12-22 PROCEDURE — C1819 TISSUE LOCALIZATION-EXCISION: HCPCS

## 2023-12-22 PROCEDURE — 2780000003 HC OR 278 NO HCPCS

## 2023-12-22 PROCEDURE — 88305 TISSUE EXAM BY PATHOLOGIST: CPT | Performed by: PATHOLOGY

## 2023-12-22 PROCEDURE — 77065 DX MAMMO INCL CAD UNI: CPT | Mod: LEFT SIDE | Performed by: RADIOLOGY

## 2023-12-22 PROCEDURE — 76882 US LMTD JT/FCL EVL NVASC XTR: CPT | Mod: LEFT SIDE | Performed by: RADIOLOGY

## 2023-12-22 RX ADMIN — Medication 20 ML: at 13:06

## 2023-12-22 ASSESSMENT — PAIN SCALES - GENERAL
PAINLEVEL_OUTOF10: 0 - NO PAIN
PAINLEVEL_OUTOF10: 3

## 2023-12-22 NOTE — Clinical Note
Procedural steps explained and patient given opportunity to verbalize concerns and seek clarification.  Post procedure self-care and potential for bruising , hematoma, and pain reviewed.  Patient verbalizes understanding.

## 2023-12-22 NOTE — Clinical Note
Pressure held x 10 minutes, incision dry, steri strips intact and compression dressing applied. Ice pack applied.

## 2023-12-22 NOTE — PATIENT INSTRUCTIONS
Thank you for coming to see me today at [facility]. I recommend biopsy. A breast radiology physician will perform the biopsy. Possible diagnoses include benign, atypical, or cancer as we discussed.  Bruising and mild discomfort after the biopsy is normal and will improve. I normally have results in 7 business days. I will call you with results, please have your phone handy to take my call.    IMPORTANT INFORMATION REGARDING YOUR RESULTS  If you receive medical information from My [facility] Personal Health Record (online chart) your results will be released into your chart. This means you may view or see results of your biopsy or procedure before I contact you directly. If this occurs, please call the office and we will discuss your results over the phone.     You can see your health information, review clinical summaries from office visits & test results online when you follow your health with MY  Chart, a personal health record. To sign up go to [email or web address]. If you need assistance with signing up or trouble getting into your account call [employer] Patient Line 24/7 at [phone].     Should you have any questions or concerns after biopsy, please do not hesitate to call my office at [phone]. If it has been more than a week since your biopsy was performed and you have not been given the results, please call my office [phone]. Thank you for choosing [facility] and trusting me as your healthcare provider. I am honored to be a provider on your health care team and I remain dedicated to helping you achieve your health goals.    
home w/ home PT

## 2023-12-22 NOTE — DISCHARGE INSTRUCTIONS
AFTER THE TEST  A steri-strip and bandage will be placed over the incision. You may shower after 24 hours. Remove bandage after 24 hours. Remove bandage after the shower. Leave the steri-strips in place to fall off on their own. If after 1 week the steri-strips are still on, you may remove them. Avoid swimming or soaking in tub for 3 days.     You may have mild discomfort at the test site. If needed, you may take Tylenol (Acetaminophen) for pain. Please avoid taking NSAIDs, Motrin, Advil, Aleve, or ibuprofen for 24 hours following the biopsy. After 24 hours you may resume NSAIDSs.     If you take aspirin, Plavix, Coumadin, Xarelto or Eliquis please tell us. If these medications were stopped by your provider, please ask them when to resume.     You may have some tenderness, bruising or slight bleeding at the site. Please apply ice packs to the site for 15 minutes on and 15 minutes off for a 2 hour minimum.     Most people can return to their usual routine after the procedure. Avoid Strenuous activity for 24 hours.     Sleep in a bra the night after your biopsy. Continue to do so for comfort.     Call your doctor if you have any of the following symptoms :  Fever  Increased pain  Increased bleeding  Redness  Increased swelling  Yellowish drainage  Your doctor will get the biopsy results within 5 - 7 days. Call your doctor with any questions.     Patient education brochure and pain/comfort measures have been reviewed.   Phone number provided to contact Breast Center if problems arise.     Patient verbalized understanding of home going instructions.   Patient left breast center ambulatory.

## 2023-12-27 ENCOUNTER — TELEPHONE (OUTPATIENT)
Dept: SURGERY | Facility: HOSPITAL | Age: 33
End: 2023-12-27
Payer: COMMERCIAL

## 2023-12-27 DIAGNOSIS — C50.912 RECURRENT MALIGNANT NEOPLASM OF LEFT BREAST (MULTI): Primary | ICD-10-CM

## 2023-12-27 NOTE — TELEPHONE ENCOUNTER
"Result Communication    Resulted Orders   Surgical Pathology Exam   Result Value Ref Range    Case Report       Surgical Pathology                                Case: [id number]                                  Authorizing Provider:  PUSHPA Murcia-VIVIENNE  Collected:           12/22/2023 1329              Ordering Location:     [facility]       Received:            12/22/2023 1559                                     Center                                                                       Pathologist:           Nannette Joseph MD                                                         Specimen:    BREAST CORE BIOPSY LEFT, Left Breast Palp 12:00                                            FINAL DIAGNOSIS       A.  Left breast mass palpable 12:00, ultrasound guided core needle biopsy:  -- Invasive ductal carcinoma, grade 3, see note.    Note:  In this limited sample, the invasive carcinoma measures up to 5 mm in greatest dimension.  ER, AK and HER2 will be reported in an addendum.                By the signature on this report, the individual or group listed as making the Final Interpretation/Diagnosis certifies that they have reviewed this case.       Gross Description       A: Received in formalin, labeled with the patient´s name and hospital number and \"left breast palp 12:00\", are multiple irregular/cylindrical segments of yellow-white fatty soft tissue aggregating to 1.0 x 1.0 x 0.2 cm.  The specimen is submitted in toto in two cassettes.  [facility]    NOTE:  Ischemia time: Not provided.  This specimen was placed into formalin at: 12/22/23  13:29.      Disclaimer       One or more of the reagents used to perform assays on this specimen MAY have contained components considered to be analyte specific reagents (ASR's).  ASR's have not been cleared or approved by the U.S. Food and Drug Administration.  These assays were developed and their performance characteristics determined by the Department of " Pathology at [facility]. The FDA does not require this test to go through premarket FDA review. This test is used for clinical purposes. It should not be regarded as investigational or for research. This laboratory is certified under the Clinical Laboratory Improvement Amendments (CLIA) as qualified to perform high complexity clinical laboratory testing.  The assays were performed with appropriate positive and negative controls which stained appropriately.         11:21 AM      Results were successfully communicated with the patient and they acknowledged their understanding. Informed left chest wall biopsy shows IDC recurrence. She will return for appt with Dr Pugh

## 2023-12-27 NOTE — TELEPHONE ENCOUNTER
Result Communication    Resulted Orders   BI US guided breast localization and biopsy left    Addendum: 12/27/2023    Interpreted By:  Briana Bauer,   ADDENDUM:  The final pathology for palpable complex solid and cystic mass, 12  o'clock position left reconstructed breast is invasive ductal  carcinoma grade 3. This malignant result is concordant.      Estimated size of mass/extent of disease: Mass measures 4.1 x 1.7 x  3.5 cm on ultrasound 12/21/2023..      Ipsilateral lymph nodes: The patient has had left axillary lymph  nodes removed. A single persisting axillary lymph node with  borderline cortical thickening but maintain fatty hilum could not be  biopsied given the deep location. Chest CT or breast MRI recommended  for further evaluation.      MRI recommendation: At the discretion of the referring surgeon.      Critical Finding:  See findings. Notification was initiated on  12/27/2023 at 11:09 am by  Briana Bauer.  (**-YCF-**) Instructions:          Signed by: Briana Bauer 12/27/2023 11:09 AM      -------- ORIGINAL REPORT --------  Dictation workstation:   [id number]      Narrative    Interpreted By:  Briana Bauer,   STUDY:  BI US GUIDED BREAST LOCALIZATION AND BIOPSY LEFT; BI BREAST BIOPSY  CLIP IMAGING;  12/22/2023 2:32 pm; 12/22/2023 2:46 pm      ACCESSION NUMBER(S):  [id number]; [id number]      ORDERING CLINICIAN:  AJAY GUARDADO      INDICATION:  Palpable solid and cystic left breast mass, 12 o'clock position of  mastectomy bed within the reconstructed left breast. Single  indeterminate left axillary lymph node.      FINDINGS:  PREPROCEDURAL CONSULTATION: The history and physical exam pertinent  to the procedure were reviewed and no updates were made.      The procedure was explained to the patient including the risks,  benefits, and alternatives. Medications were discussed, including any  prior use of blood thinning medications by the patient. Patient  allergies were reviewed. The risks, including but  not limited to  infection and bleeding, were reviewed by the performing physician and  the patient agreed to undergo the procedure.      Prior to the procedure, an audible timeout was done to verify patient  identification, site and type of procedure. Dr. Bauer, a radiology  nurse and an ultrasound technologist were present.      PROCEDURE: Scanning of the left breast redemonstrated the mass of  concern in the 12 o'clock position. The left breast was marked under  ultrasound guidance and cleansed.  10 mL of buffered 1% lidocaine was  injected subcutaneously and then into the deeper tissues surrounding  the mass. A small incision was made.  3 tissue core specimens were  then obtained with a 14-gauge spring-loaded biopsy needle with  minimal bleeding (estimated blood loss less than 10 mL).  A open coil  Hydromark tissue marker was then placed into the biopsy site.  Hemostasis was achieved with manual compression. The patient  tolerated the procedure without difficulty.      Thereafter, extensive sonographic scanning of the left axilla was  performed. A single oval circumscribed mass with hyperechoic center  was visualized in the deep tissue. There is no internal Doppler blood  flow to the hilum. The skin was cleansed and 1% lidocaine was  administered. After administering lidocaine, the biopsy target was  not well seen. A 14 gauge needle was advanced into the tissue but the  targeted could not be reliably reproduced. The target could not be  seen for biopsied Norco we placed a clip due to lack of visualization.          The postbiopsy mammogram demonstrates satisfactory placement of the  open coil tissue marker in the central aspect of the craniocaudal  view..      The patient experienced no complications during the procedure.  Home-going/follow-up instructions were reviewed with the patient  before she left the department.        Impression    Status post ultrasound guided core needle biopsy of a left breast  mass  followed by tissue marker placement. Pathology is pending.      Biopsy of left axillary lymph node not performed due to lack of  visualization after the administration of lidocaine. Pending  pathology, cross-sectional imaging with MRI recommended.      POST PROCEDURE MAMMOGRAM FOR MARKER PLACEMENT.      MACRO:  None      Signed by: Briana Bauer 12/22/2023 3:03 PM  Dictation workstation:   [id number]       11:38 AM      Results were successfully communicated with the patient and they acknowledged their understanding. Informed results IDC, she will return to see Dr Pugh

## 2023-12-29 LAB
LAB AP ASR DISCLAIMER: NORMAL
LABORATORY COMMENT REPORT: NORMAL
PATH REPORT.ADDENDUM SPEC: NORMAL
PATH REPORT.FINAL DX SPEC: NORMAL
PATH REPORT.GROSS SPEC: NORMAL
PATH REPORT.TOTAL CANCER: NORMAL

## 2024-01-10 ENCOUNTER — HOSPITAL ENCOUNTER (OUTPATIENT)
Dept: RADIOLOGY | Facility: HOSPITAL | Age: 34
Discharge: HOME | End: 2024-01-10
Payer: COMMERCIAL

## 2024-01-10 ENCOUNTER — TELEPHONE (OUTPATIENT)
Dept: SURGERY | Facility: HOSPITAL | Age: 34
End: 2024-01-10
Payer: COMMERCIAL

## 2024-01-10 DIAGNOSIS — C50.912: ICD-10-CM

## 2024-01-10 DIAGNOSIS — Z87.821 PERSONAL HISTORY OF RETAINED FOREIGN BODY FULLY REMOVED: ICD-10-CM

## 2024-01-10 DIAGNOSIS — C50.912 RECURRENT MALIGNANT NEOPLASM OF LEFT BREAST (MULTI): ICD-10-CM

## 2024-01-10 PROCEDURE — 77049 MRI BREAST C-+ W/CAD BI: CPT | Performed by: RADIOLOGY

## 2024-01-10 PROCEDURE — 2550000001 HC RX 255 CONTRASTS: Performed by: NURSE PRACTITIONER

## 2024-01-10 PROCEDURE — 71045 X-RAY EXAM CHEST 1 VIEW: CPT

## 2024-01-10 PROCEDURE — 77049 MRI BREAST C-+ W/CAD BI: CPT

## 2024-01-10 PROCEDURE — A9575 INJ GADOTERATE MEGLUMI 0.1ML: HCPCS | Performed by: NURSE PRACTITIONER

## 2024-01-10 RX ORDER — GADOTERATE MEGLUMINE 376.9 MG/ML
0.2 INJECTION INTRAVENOUS
Status: COMPLETED | OUTPATIENT
Start: 2024-01-10 | End: 2024-01-10

## 2024-01-10 RX ADMIN — GADOTERATE MEGLUMINE 19 ML: 376.9 INJECTION INTRAVENOUS at 10:34

## 2024-01-10 NOTE — PROGRESS NOTES
BREAST SURGICAL ONCOLOGY FOLLOW UP     Subjective   Kirsty Shrestha is a 33 y.o. female presents today for follow up with newly diagnosed recurrent carcinoma of the left breast.   She had a ultrasound-guided core biopsy of a palpable 12:00 left breast mass on December 22, 2023.  This was a conglomerate of solid and cystic masses measuring up to 4.1 cm.  Pathology showed: Invasive ductal carcinoma, grade 3; ER greater than 95%, NJ greater than 95%, HER2 negative  Breast MRI done on January 10, 2024: Impression  1. Known malignancy in the left breast measuring 3.8 cm. Surgical  consultation is recommended.  2. Indeterminate left axillary lymph node. Repeat biopsy or sentinel  lymph node biopsy is recommended. A preprocedure form has been  completed.  3. Nonspecific internal mammary lymph node on the left.  4. No evidence of malignancy in the right reconstructed breast.        Cancer History:   Treatment Synopsis:    Diagnosis: Invasive ductal carcinoma of the left breast; ER >95%, NJ >95%, Her2-negative  Stage: IA (pT1b pN0)  Mammaprint: High-risk, luminal B, -0.615     -Palpable lump in left breast.  -4/8/22: Dx MG/US performed. A 1.3 x 0.6 x 0.7 cm hypoechoic mass seen in left breast (11:00, 13 cm FN). An additional 0.9 cm mass at 2:00 (8 cm FN) was also seen, possibly benign. ALN’s appeared unremarkable.  -4/20/22: Left breast mass biopsy (11:00) showed IDC, grade 3. ER >95%, NJ >95%, Her2-negative (2+ IHC; MAHNAZ ratio 1.9, copy #3.6). Mammaprint high-risk (-0.615), luminal B type. Maximum diameter 0.7 cm.  -4/28/22: Biopsy of 2:00 lesion benign. Left ALN negative.  -7/14/22: Bilateral mastectomy with implant reconstruction and left SLNBx performed. No residual carcinoma seen in left breast. Right breast benign. 0/1 left SLN’s involved. pT1bN0 (IA).          She is here today to discuss these results and develop a treatment plan.      PHYSICAL EXAM:    General: Alert and oriented x 3.  Mood and affect are  appropriate.  Breast: No cervical, supraclavicular or axillary lymphadenopathy.  Bilateral implants in place with well-healed incisions.  On the right side there are no palpable masses.  On the left side there is a 3-1/2 cm palpable mass in the 12 o'clock position which is firm.  No skin changes.      Assessment/Plan     Clinical stage IIA recurrent left breast cancer  [id number]  Invasive ductal carcinoma, grade 3; ER greater than 95%, PA greater than 95%, HER2 negative    Recurrent left breast cancer following a bilateral mastectomy with implant reconstruction.  We plan a wide excision of the mass.  The mass is very close to the skin and very close to her implant.  Skin and implant capsule will be taken.  We have discussed that there is a possibility that the implant would be damaged and may need to be removed and/or replaced at a later time.  There was a left axillary lymph node that looked indeterminant on ultrasound.  It was not able to be core biopsied because of its location.  The MRI shows that the lymph node appears indeterminant.  My plan is to place a Magseed and excise that lymph node at the time of surgery.  I will also attempt a left axillary sentinel lymph node biopsy however, a sentinel lymph node may not be found given that she has had a previous sentinel lymph node biopsy and a mastectomy.    I have discussed with the patient the pathophysiology of the disease process and the rationale for the planned surgery. I have explained the anticipated risks and possible complications, the incidences and consequences of those risks and complications, and the expected postoperative course. Alternatives have been discussed including the alternative of no surgery. The patient has been given the opportunity to ask questions and all her questions have been answered to her satisfaction.     Surgery has been recommended. The risks, benefits and procedure have been reviewed with you today.   You may be scheduled for  pre-surgical testing and detailed instructions will be given to you at that appointment.  The pathology results from your surgery should be available about 7 days after the procedure. I will call you with the results. If you do not hear from me within 5 days, please call the office at [phone] for your results.    Schedule a metastatic workup.  These are CAT scans of the chest, abdomen and pelvis and a bone scan to be sure that the cancer has not spread anywhere else in the body.    Schedule left lumpectomy/wide local excision, left axillary sentinel lymph node biopsy and excision of left axillary lymph node with Magseed localization.    Renee Pugh MD

## 2024-01-10 NOTE — TELEPHONE ENCOUNTER
Result Communication    Resulted Orders   MR breast bilateral w contrast full protocol    Narrative    Interpreted By:  Emelia Malagon,   STUDY:  BI MR BREAST BILATERAL WITH CONTRAST FULL PROTOCOL;  1/10/2024 10:40  am      ACCESSION NUMBER(S):  [id number]      ORDERING CLINICIAN:  AJAY GUARDADO      INDICATION:  Recent diagnosis of a left breast mass at the 12 o'clock position is  invasive ductal carcinoma, grade 3. Biopsy of the left axillary lymph  node was not performed given the deep location and lack of  visualization after lidocaine administration. History of bilateral  breast mastectomy.      History of left breast cancer diagnosed in May 2022 and treated with  bilateral mastectomy. Patient has bilateral saline implants with fat  grafting.      COMPARISON:  12/22/2023, 12/21/2023      TECHNIQUE:  Using a dedicated breast coil, STIR axial and T1-weighted fat  saturation axial images of the breasts were obtained, the latter both  before and after intravenous administration of Gadolinium DTPA. On an  independent workstation, 3-D images were formulated using [employer]  including time enhancement curves, subtraction images and MIP images.      Intravenous contrast:  19 mL of Dotarem      FINDINGS:  There is  symmetric  minimal bilateral background enhancement.  There are bilateral breast saline implants. The study is limited  secondary to motion. Density:  The breast tissue is almost entirely  fatty.      RIGHT BREAST:  No suspicious mass or nonmass enhancement is  identified.      No axillary or internal mammary lymphadenopathy is appreciated.      LEFT BREAST:  In the upper inner quadrant of the left breast anterior  to the implant there is an irregular enhancing mass measuring 3.8 x  2.7 x 2.7 cm measured on axial image 248 of 180. Involvement of the  skin and the shell of the implant can not be excluded.      There is a nonspecific internal mammary lymph node measuring 0.4 cm  on image 450 of 180. There is  an enlarged lymph node with a thickened  cortex measuring 1.2 cm seen on axial image 256 of 180.      NON-BREAST FINDINGS:  None.        Impression    1. Known malignancy in the left breast measuring 3.8 cm. Surgical  consultation is recommended.  2. Indeterminate left axillary lymph node. Repeat biopsy or sentinel  lymph node biopsy is recommended. A preprocedure form has been  completed.  3. Nonspecific internal mammary lymph node on the left.  4. No evidence of malignancy in the right reconstructed breast.      BI-RADS CATEGORY:  BI-RADS Category:  4 Suspicious.  Recommendation:  Biopsy.  Recommended Date:  Immediate.  Laterality:  Left.      For any future breast imaging appointments, please call [phone] ([phone]).          MACRO:  Critical Finding:  See findings. Notification was initiated on  1/10/2024 at 2:20 pm by  Emelia Malagon.  (**-YCF-**) Instructions:  See Impression for specific recommendations.          Signed by: Emelia Malagon 1/10/2024 2:28 PM  Dictation workstation:   [id number]       2:59 PM      Results were successfully communicated with the patient and they acknowledged their understanding.

## 2024-01-12 ENCOUNTER — PREP FOR PROCEDURE (OUTPATIENT)
Dept: SURGICAL ONCOLOGY | Facility: CLINIC | Age: 34
End: 2024-01-12

## 2024-01-12 ENCOUNTER — OFFICE VISIT (OUTPATIENT)
Dept: SURGICAL ONCOLOGY | Facility: CLINIC | Age: 34
End: 2024-01-12
Payer: COMMERCIAL

## 2024-01-12 VITALS
SYSTOLIC BLOOD PRESSURE: 148 MMHG | HEART RATE: 80 BPM | DIASTOLIC BLOOD PRESSURE: 85 MMHG | BODY MASS INDEX: 38.64 KG/M2 | WEIGHT: 210 LBS | HEIGHT: 62 IN

## 2024-01-12 DIAGNOSIS — C79.89 CHEST WALL RECURRENCE OF BREAST CANCER, LEFT (MULTI): Primary | ICD-10-CM

## 2024-01-12 DIAGNOSIS — C50.912 CHEST WALL RECURRENCE OF BREAST CANCER, LEFT (MULTI): Primary | ICD-10-CM

## 2024-01-12 PROBLEM — M25.461 EFFUSION, RIGHT KNEE: Status: ACTIVE | Noted: 2023-09-25

## 2024-01-12 PROBLEM — Z86.19 HISTORY OF VARICELLA: Status: ACTIVE | Noted: 2024-01-12

## 2024-01-12 PROBLEM — Z97.5 USES INTRAUTERINE DEVICE FOR BIRTH CONTROL: Status: ACTIVE | Noted: 2023-10-12
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
## 2024-01-12 PROBLEM — Z42.1 ENCOUNTER FOR BREAST RECONSTRUCTION FOLLOWING MASTECTOMY: Status: ACTIVE | Noted: 2023-05-23

## 2024-01-12 PROBLEM — M79.643 PAIN OF HAND: Status: ACTIVE | Noted: 2024-01-12

## 2024-01-12 PROBLEM — Z86.32 HISTORY OF GESTATIONAL DIABETES MELLITUS (GDM): Status: ACTIVE | Noted: 2024-01-12

## 2024-01-12 PROBLEM — F10.129 ALCOHOL ABUSE WITH INTOXICATION (CMS-HCC): Status: ACTIVE | Noted: 2023-09-25

## 2024-01-12 PROBLEM — O16.9 HYPERTENSION COMPLICATING PREGNANCY (HHS-HCC): Status: ACTIVE | Noted: 2024-01-12

## 2024-01-12 PROBLEM — E88.810 METABOLIC SYNDROME: Status: ACTIVE | Noted: 2022-08-09

## 2024-01-12 PROBLEM — A74.9 INFECTION DUE TO CHLAMYDIA SPECIES: Status: ACTIVE | Noted: 2024-01-12

## 2024-01-12 PROBLEM — M79.651 PAIN IN RIGHT THIGH: Status: ACTIVE | Noted: 2023-09-25
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
## 2024-01-12 PROBLEM — S30.814A VAGINAL ABRASION: Status: ACTIVE | Noted: 2024-01-12

## 2024-01-12 PROBLEM — M25.561 RIGHT KNEE PAIN: Status: ACTIVE | Noted: 2024-01-12

## 2024-01-12 PROBLEM — C50.919 MALIGNANT NEOPLASM OF BREAST (MULTI): Status: ACTIVE | Noted: 2022-11-28

## 2024-01-12 PROCEDURE — 3079F DIAST BP 80-89 MM HG: CPT | Performed by: SURGERY

## 2024-01-12 PROCEDURE — 3008F BODY MASS INDEX DOCD: CPT | Performed by: SURGERY

## 2024-01-12 PROCEDURE — 1036F TOBACCO NON-USER: CPT | Performed by: SURGERY

## 2024-01-12 PROCEDURE — 3077F SYST BP >= 140 MM HG: CPT | Performed by: SURGERY

## 2024-01-12 PROCEDURE — 99215 OFFICE O/P EST HI 40 MIN: CPT | Performed by: SURGERY

## 2024-01-12 RX ORDER — SODIUM CHLORIDE, SODIUM LACTATE, POTASSIUM CHLORIDE, CALCIUM CHLORIDE 600; 310; 30; 20 MG/100ML; MG/100ML; MG/100ML; MG/100ML
100 INJECTION, SOLUTION INTRAVENOUS CONTINUOUS
Status: CANCELLED | OUTPATIENT
Start: 2024-01-31

## 2024-01-12 RX ORDER — POLYMYXIN B SULFATE AND TRIMETHOPRIM 1; 10000 MG/ML; [USP'U]/ML
SOLUTION OPHTHALMIC
COMMUNITY
Start: 2018-05-03 | End: 2024-03-08 | Stop reason: ALTCHOICE

## 2024-01-12 RX ORDER — NAPROXEN 500 MG/1
TABLET ORAL EVERY 12 HOURS
COMMUNITY
Start: 2018-06-26 | End: 2024-03-08 | Stop reason: ALTCHOICE

## 2024-01-12 RX ORDER — PHENTERMINE HYDROCHLORIDE 37.5 MG/1
TABLET ORAL
COMMUNITY
End: 2024-03-08 | Stop reason: ALTCHOICE

## 2024-01-12 RX ORDER — CLOTRIMAZOLE AND BETAMETHASONE DIPROPIONATE 10; .64 MG/G; MG/G
CREAM TOPICAL EVERY 12 HOURS
COMMUNITY
Start: 2019-12-05 | End: 2024-03-08 | Stop reason: ALTCHOICE

## 2024-01-12 RX ORDER — METRONIDAZOLE 500 MG/1
TABLET ORAL EVERY 12 HOURS
COMMUNITY
Start: 2019-01-16 | End: 2024-03-08 | Stop reason: ALTCHOICE

## 2024-01-12 RX ORDER — MELOXICAM 15 MG/1
TABLET ORAL
COMMUNITY
Start: 2018-08-08 | End: 2024-03-08 | Stop reason: ALTCHOICE

## 2024-01-12 RX ORDER — HYDROCODONE BITARTRATE AND ACETAMINOPHEN 5; 325 MG/1; MG/1
TABLET ORAL
COMMUNITY
Start: 2018-11-05 | End: 2024-02-26 | Stop reason: HOSPADM

## 2024-01-12 RX ORDER — ERGOCALCIFEROL 1.25 MG/1
CAPSULE ORAL
COMMUNITY
Start: 2018-01-10 | End: 2024-03-08 | Stop reason: ALTCHOICE

## 2024-01-12 RX ORDER — FLUCONAZOLE 150 MG/1
TABLET ORAL
COMMUNITY
Start: 2019-01-16 | End: 2024-03-08 | Stop reason: ALTCHOICE

## 2024-01-12 RX ORDER — FLUTICASONE PROPIONATE 50 MCG
SPRAY, SUSPENSION (ML) NASAL
COMMUNITY
Start: 2015-01-09 | End: 2024-04-16 | Stop reason: ALTCHOICE

## 2024-01-12 RX ORDER — NYSTATIN 100000 [USP'U]/G
POWDER TOPICAL
COMMUNITY
Start: 2019-12-05 | End: 2024-03-08 | Stop reason: ALTCHOICE

## 2024-01-12 ASSESSMENT — PAIN SCALES - GENERAL: PAINLEVEL: 0-NO PAIN

## 2024-01-16 PROBLEM — C50.912: Status: ACTIVE | Noted: 2024-01-12

## 2024-01-16 PROBLEM — C79.89: Status: ACTIVE | Noted: 2024-01-12

## 2024-01-17 ENCOUNTER — OFFICE VISIT (OUTPATIENT)
Dept: PLASTIC SURGERY | Facility: CLINIC | Age: 34
End: 2024-01-17
Payer: COMMERCIAL

## 2024-01-17 DIAGNOSIS — C50.912 CHEST WALL RECURRENCE OF BREAST CANCER, LEFT (MULTI): Primary | ICD-10-CM

## 2024-01-17 DIAGNOSIS — C79.89 CHEST WALL RECURRENCE OF BREAST CANCER, LEFT (MULTI): Primary | ICD-10-CM

## 2024-01-17 PROCEDURE — 3008F BODY MASS INDEX DOCD: CPT | Performed by: PHYSICIAN ASSISTANT

## 2024-01-17 PROCEDURE — 99213 OFFICE O/P EST LOW 20 MIN: CPT | Performed by: PHYSICIAN ASSISTANT

## 2024-01-17 PROCEDURE — 1036F TOBACCO NON-USER: CPT | Performed by: PHYSICIAN ASSISTANT

## 2024-01-17 NOTE — PROGRESS NOTES
Plastic and Reconstructive Surgery              Follow Up Visit                      Date: 01/17/24  Date of Surgery: 5/23/25    Subjective   Kirsty Shrestha is a 33 y.o. female who presents for POV. They are s/p bilateral stage 2 breast recosntruction with removal of TE and placement of new IDEA: saline implants 610cc with autologous fat grafting of 160cc per breast and abdominoplasty with rectus plication and umbilical transposition on 5/23/23 with Dr. Barker.       She presents for follow up visit. At her last appointment with us on 12/18/23 she presented to undergo possibly more fat grafting. At that time she noted a left superior breast mass that was firm, this was thought to be from fat grafting or concerns for fat necrosis. We sent her for US imaging of this mass which resulted as BI-RADS cat 4 for the left breast 12:00 4.1cm complex cystic and solid mass. She underwent biopsy of 12:00 mass which showed recurrence of invasive ductal carcinoma. She met with Dr. Pugh on 1/12/24. She has plans for wide excision of mass and axillary lymph node  SLNB. At her visit with Dr. Pugh she states that there is concern that the mass is close to the skin and implant capsule that damage may occur to her implant or the implant may need to be removed. She presents today inquiring about timeline to have a new implant placed if her implant is needed to be removed at the time of surgery with Dr. Pugh on 1/31/24.         Objective   Exam deferred audio only visit    From previous visit 12/18/23  Focused exam of the: breast  B/L breasts with excellent symmetry. There is mild superior medial hollowing noted. Left breast has large cyst like lesion at the superior portion of the breast, this is rubbery and mobile. Non-tender to palpation. Likely fat necrosis. Surgical scars are well healed.     Assessment/Plan     Kirsty Shrestha is a 33 y.o. female who presents for follow up visit. They are s/p bilateral  stage 2 breast recosntruction with removal of TE and placement of new IDEA: saline implants 610cc with autologous fat grafting of 160cc per breast and abdominoplasty with rectus plication and umbilical transposition on 5/23/23 with Dr. Barker.       She is to undergo wide excision left breast mass and axillary lymph node biopsy with Dr. Pugh on 1/31/24. I advised that if damage occurs to her implant or the implant needs to be removed we will have her follow up after her surgery with Dr. Pugh to further discuss timeframe for revision reconstruction. She endorses that she may need chemo or radiation therapy but this will depend on pathology. I will discuss this with Dr. Barker and we will continue to follow.        Plan:   Surgery with Dr. Pugh on 1/31/24  Follow up with our office after surgery with Dr. Pugh  Will discuss with Dr. Barker     I spent 20 minutes with this patient. Greater than 50% of this time was spent in the counselling and/or coordination of care of this patient.  This note was created using voice recognition software and was not corrected for typographical or grammatical errors.    Signature: Roslyn Bermudez PA-C  Date: 01/17/24

## 2024-01-18 ENCOUNTER — ANCILLARY PROCEDURE (OUTPATIENT)
Dept: RADIOLOGY | Facility: CLINIC | Age: 34
End: 2024-01-18
Payer: COMMERCIAL

## 2024-01-18 DIAGNOSIS — C79.89 CHEST WALL RECURRENCE OF BREAST CANCER, LEFT (MULTI): ICD-10-CM

## 2024-01-18 DIAGNOSIS — C79.89 SECONDARY MALIGNANT NEOPLASM OF OTHER SPECIFIED SITES (MULTI): ICD-10-CM

## 2024-01-18 DIAGNOSIS — C50.912 MALIGNANT NEOPLASM OF UNSPECIFIED SITE OF LEFT FEMALE BREAST (MULTI): ICD-10-CM

## 2024-01-18 DIAGNOSIS — C50.912 CHEST WALL RECURRENCE OF BREAST CANCER, LEFT (MULTI): ICD-10-CM

## 2024-01-18 PROCEDURE — 77065 DX MAMMO INCL CAD UNI: CPT | Mod: LT

## 2024-01-18 PROCEDURE — 2500000005 HC RX 250 GENERAL PHARMACY W/O HCPCS: Performed by: STUDENT IN AN ORGANIZED HEALTH CARE EDUCATION/TRAINING PROGRAM

## 2024-01-18 PROCEDURE — 2780000003 HC OR 278 NO HCPCS

## 2024-01-18 PROCEDURE — 10036 PLMT SFT TISS LOCLZJ DEV EA: CPT | Mod: LEFT SIDE | Performed by: STUDENT IN AN ORGANIZED HEALTH CARE EDUCATION/TRAINING PROGRAM

## 2024-01-18 PROCEDURE — 19286 PERQ DEV BREAST ADD US IMAG: CPT | Mod: LT

## 2024-01-18 PROCEDURE — A4648 IMPLANTABLE TISSUE MARKER: HCPCS

## 2024-01-18 PROCEDURE — 77065 DX MAMMO INCL CAD UNI: CPT | Mod: LEFT SIDE | Performed by: STUDENT IN AN ORGANIZED HEALTH CARE EDUCATION/TRAINING PROGRAM

## 2024-01-18 PROCEDURE — 19285 PERQ DEV BREAST 1ST US IMAG: CPT | Mod: LT

## 2024-01-18 PROCEDURE — 10035 PLMT SFT TISS LOCLZJ DEV 1ST: CPT | Mod: LEFT SIDE | Performed by: STUDENT IN AN ORGANIZED HEALTH CARE EDUCATION/TRAINING PROGRAM

## 2024-01-18 RX ADMIN — Medication 10 ML: at 12:30

## 2024-01-18 ASSESSMENT — PAIN SCALES - GENERAL
PAINLEVEL_OUTOF10: 2
PAINLEVEL_OUTOF10: 0 - NO PAIN
PAINLEVEL_OUTOF10: 0 - NO PAIN

## 2024-01-18 ASSESSMENT — PAIN - FUNCTIONAL ASSESSMENT
PAIN_FUNCTIONAL_ASSESSMENT: 0-10
PAIN_FUNCTIONAL_ASSESSMENT: 0-10

## 2024-01-23 ENCOUNTER — APPOINTMENT (OUTPATIENT)
Dept: SURGICAL ONCOLOGY | Facility: CLINIC | Age: 34
End: 2024-01-23
Payer: COMMERCIAL

## 2024-01-24 ENCOUNTER — HOSPITAL ENCOUNTER (OUTPATIENT)
Dept: RADIOLOGY | Facility: CLINIC | Age: 34
Discharge: HOME | End: 2024-01-24
Payer: COMMERCIAL

## 2024-01-24 DIAGNOSIS — C79.89 CHEST WALL RECURRENCE OF BREAST CANCER, LEFT (MULTI): ICD-10-CM

## 2024-01-24 DIAGNOSIS — C50.912 CHEST WALL RECURRENCE OF BREAST CANCER, LEFT (MULTI): ICD-10-CM

## 2024-01-24 PROCEDURE — 71260 CT THORAX DX C+: CPT | Mod: FOREIGN READ | Performed by: RADIOLOGY

## 2024-01-24 PROCEDURE — 3430000001 HC RX 343 DIAGNOSTIC RADIOPHARMACEUTICALS: Performed by: SURGERY

## 2024-01-24 PROCEDURE — 74177 CT ABD & PELVIS W/CONTRAST: CPT | Mod: FOREIGN READ | Performed by: RADIOLOGY

## 2024-01-24 PROCEDURE — A9503 TC99M MEDRONATE: HCPCS | Performed by: SURGERY

## 2024-01-24 PROCEDURE — 78306 BONE IMAGING WHOLE BODY: CPT | Performed by: NUCLEAR MEDICINE

## 2024-01-24 PROCEDURE — 78306 BONE IMAGING WHOLE BODY: CPT

## 2024-01-24 PROCEDURE — 2550000001 HC RX 255 CONTRASTS: Mod: SE | Performed by: SURGERY

## 2024-01-24 PROCEDURE — 71260 CT THORAX DX C+: CPT | Mod: FR

## 2024-01-24 RX ADMIN — IOHEXOL 75 ML: 350 INJECTION, SOLUTION INTRAVENOUS at 11:37

## 2024-01-24 RX ADMIN — TECHNETIUM TC 99M MEDRONATE 23.8 MILLICURIE: 25 INJECTION, POWDER, FOR SOLUTION INTRAVENOUS at 11:50

## 2024-01-25 ENCOUNTER — TELEPHONE (OUTPATIENT)
Dept: SURGERY | Facility: HOSPITAL | Age: 34
End: 2024-01-25
Payer: COMMERCIAL

## 2024-01-25 NOTE — TELEPHONE ENCOUNTER
Result Communication    Resulted Orders   CT chest abdomen pelvis w IV contrast    Narrative    STUDY:  CT Chest, Abdomen, and Pelvis with IV Contrast; 1/24/2024 at 11:42 AM.  INDICATION:  Metastatic workup, oncology planning.  COMPARISON:  XR chest 1/10/2024; CT AP 10/27/2022; US pelvis 6/20/2022.  ACCESSION NUMBER(S):  [id number]  ORDERING CLINICIAN:  SUKI CAGLE  TECHNIQUE:  CT of the chest, abdomen, and pelvis was performed.  Contiguous axial  images were obtained at 3 mm slice thickness through the chest,  abdomen, and pelvis.  Coronal and sagittal reconstructions at 3 mm  slice thickness were performed.  Omnipaque 350 75 mL was administered  intravenously.    FINDINGS:  CHEST:  MEDIASTINUM:  The heart is normal in size without pericardial effusion.  Central  vascular structures opacify normally.    LUNGS/PLEURA:  There is no pleural effusion, pleural thickening, or pneumothorax.   The airways are patent.  Lungs are clear without consolidation, interstitial disease, or  suspicious nodules.  LYMPH NODES:  Thoracic lymph nodes are not enlarged.  ABDOMEN:     LIVER:  No hepatomegaly.  Smooth surface contour.  Normal attenuation.  There  is a small low-density focus noted adjacent to the falciform ligament  which may represent focal fatty infiltration.  This is stable compared  to the prior.     BILE DUCTS:  No intrahepatic or extrahepatic biliary ductal dilatation.     GALLBLADDER:  The gallbladder is visualized.  No cholelithiasis.  STOMACH:  No abnormalities identified.     PANCREAS:  No masses or ductal dilatation.     SPLEEN:  No splenomegaly or focal splenic lesion.     ADRENAL GLANDS:  No thickening or nodules.     KIDNEYS AND URETERS:  Kidneys are normal in size and location.  No renal or ureteral  calculi.     PELVIS:     BLADDER:  No abnormalities identified.     REPRODUCTIVE ORGANS:  There is an intrauterine device noted within the uterus.  The uterus  is anteverted.  Is a heterogeneously enhancing  lesion located within  the fundus measuring 3.4 x 4.1 cm in diameter (206-37).  The multi  cystic right adnexal mass seen on the prior exam is no longer  visualized.     BOWEL:  The appendix is normal in caliber.  There is no definite evidence of  bowel thickening or dilatation to suggest mechanical obstruction.     VESSELS:  No abnormalities identified.  Abdominal aorta is normal in caliber.      PERITONEUM/RETROPERITONEUM/LYMPH NODES:  No free fluid.  No pneumoperitoneum.  There are subcentimeter left periaortic lymph nodes visualized.  No  significant abdominal or pelvic lymphadenopathy by CT size criteria.    ABDOMINAL WALL:  No abnormalities identified.  SOFT TISSUES:   Breast implants are noted.  There is mixed cystic/solid nodular  densities anterior to the left breast implant (201-15 measuring  approximately 2.8 x 1.5 cm in diameter.  These are indeterminate in  etiology.  BONES:  No acute fracture or aggressive osseous lesion.    Impression    1.  Nonvisualization of the previously noted multicystic  abdominal/pelvic mass.  2.  Intravenously enhancing lesion located within the uterus during  4.1 cm in diameter on the current study.  This is stable to mildly  increased compared to the prior exam.  Most common entities would  include a leiomyoma.  The liver, a malignant neoplasm cannot be  totally excluded.  3.  Definite thoracic, abdominal or pelvic lymphadenopathy by size  criteria.  4.  No definite pulmonary nodules, consolidation or pleural effusions.  5.  No definite osseous lesions.  6.  Mixed solid/cystic nodular densities noted anterior to the left  breast implants.  This is indeterminate in etiology.  This could  possibly be postsurgical in etiology versus  possible plastic  involvement.  Recommend correlation with prior breast imaging if  available.  Signed by Evin Brown MD       2:54 PM      Results were successfully communicated with the patient and they acknowledged their understanding.  CAT  scan and bone scan results were discussed with the patient.  Will refer to GYN for uterine issue.  Metastatic workup negative.

## 2024-01-31 ENCOUNTER — HOSPITAL ENCOUNTER (OUTPATIENT)
Dept: RADIOLOGY | Facility: HOSPITAL | Age: 34
Setting detail: OUTPATIENT SURGERY
Discharge: HOME | End: 2024-01-31
Payer: COMMERCIAL

## 2024-01-31 ENCOUNTER — HOSPITAL ENCOUNTER (OUTPATIENT)
Dept: RADIOLOGY | Facility: HOSPITAL | Age: 34
Discharge: HOME | End: 2024-01-31
Payer: COMMERCIAL

## 2024-01-31 ENCOUNTER — HOSPITAL ENCOUNTER (OUTPATIENT)
Facility: HOSPITAL | Age: 34
Setting detail: OUTPATIENT SURGERY
Discharge: HOME | End: 2024-01-31
Attending: SURGERY | Admitting: SURGERY
Payer: COMMERCIAL

## 2024-01-31 ENCOUNTER — ANESTHESIA EVENT (OUTPATIENT)
Dept: OPERATING ROOM | Facility: HOSPITAL | Age: 34
End: 2024-01-31
Payer: COMMERCIAL

## 2024-01-31 ENCOUNTER — ANESTHESIA (OUTPATIENT)
Dept: OPERATING ROOM | Facility: HOSPITAL | Age: 34
End: 2024-01-31
Payer: COMMERCIAL

## 2024-01-31 VITALS
OXYGEN SATURATION: 98 % | RESPIRATION RATE: 16 BRPM | BODY MASS INDEX: 38.41 KG/M2 | TEMPERATURE: 96.8 F | SYSTOLIC BLOOD PRESSURE: 131 MMHG | DIASTOLIC BLOOD PRESSURE: 77 MMHG | HEART RATE: 69 BPM | WEIGHT: 210 LBS

## 2024-01-31 DIAGNOSIS — C79.89 CHEST WALL RECURRENCE OF BREAST CANCER, LEFT (MULTI): ICD-10-CM

## 2024-01-31 DIAGNOSIS — R92.8 OTHER ABNORMAL AND INCONCLUSIVE FINDINGS ON DIAGNOSTIC IMAGING OF BREAST: ICD-10-CM

## 2024-01-31 DIAGNOSIS — C50.912 CHEST WALL RECURRENCE OF BREAST CANCER, LEFT (MULTI): ICD-10-CM

## 2024-01-31 LAB — PREGNANCY TEST URINE, POC: NEGATIVE

## 2024-01-31 PROCEDURE — 2720000007 HC OR 272 NO HCPCS: Performed by: SURGERY

## 2024-01-31 PROCEDURE — 2500000004 HC RX 250 GENERAL PHARMACY W/ HCPCS (ALT 636 FOR OP/ED): Performed by: SURGERY

## 2024-01-31 PROCEDURE — 38525 BIOPSY/REMOVAL LYMPH NODES: CPT | Performed by: SURGERY

## 2024-01-31 PROCEDURE — 38900 IO MAP OF SENT LYMPH NODE: CPT | Performed by: SURGERY

## 2024-01-31 PROCEDURE — 2500000004 HC RX 250 GENERAL PHARMACY W/ HCPCS (ALT 636 FOR OP/ED): Performed by: NURSE ANESTHETIST, CERTIFIED REGISTERED

## 2024-01-31 PROCEDURE — 81025 URINE PREGNANCY TEST: CPT | Performed by: SPECIALIST

## 2024-01-31 PROCEDURE — 76098 X-RAY EXAM SURGICAL SPECIMEN: CPT

## 2024-01-31 PROCEDURE — 38792 RA TRACER ID OF SENTINL NODE: CPT

## 2024-01-31 PROCEDURE — 7100000002 HC RECOVERY ROOM TIME - EACH INCREMENTAL 1 MINUTE: Performed by: SURGERY

## 2024-01-31 PROCEDURE — 2500000005 HC RX 250 GENERAL PHARMACY W/O HCPCS: Performed by: SPECIALIST

## 2024-01-31 PROCEDURE — 7100000010 HC PHASE TWO TIME - EACH INCREMENTAL 1 MINUTE: Performed by: SURGERY

## 2024-01-31 PROCEDURE — 88341 IMHCHEM/IMCYTCHM EA ADD ANTB: CPT | Performed by: PATHOLOGY

## 2024-01-31 PROCEDURE — A9520 TC99 TILMANOCEPT DIAG 0.5MCI: HCPCS | Performed by: SURGERY

## 2024-01-31 PROCEDURE — 7100000001 HC RECOVERY ROOM TIME - INITIAL BASE CHARGE: Performed by: SURGERY

## 2024-01-31 PROCEDURE — 88307 TISSUE EXAM BY PATHOLOGIST: CPT | Mod: TC,SUR,TRILAB,WESLAB | Performed by: SURGERY

## 2024-01-31 PROCEDURE — 3700000001 HC GENERAL ANESTHESIA TIME - INITIAL BASE CHARGE: Performed by: SURGERY

## 2024-01-31 PROCEDURE — 88307 TISSUE EXAM BY PATHOLOGIST: CPT | Performed by: PATHOLOGY

## 2024-01-31 PROCEDURE — 3600000003 HC OR TIME - INITIAL BASE CHARGE - PROCEDURE LEVEL THREE: Performed by: SURGERY

## 2024-01-31 PROCEDURE — 3430000001 HC RX 343 DIAGNOSTIC RADIOPHARMACEUTICALS: Performed by: SURGERY

## 2024-01-31 PROCEDURE — 7100000009 HC PHASE TWO TIME - INITIAL BASE CHARGE: Performed by: SURGERY

## 2024-01-31 PROCEDURE — 2500000004 HC RX 250 GENERAL PHARMACY W/ HCPCS (ALT 636 FOR OP/ED): Performed by: SPECIALIST

## 2024-01-31 PROCEDURE — A4649 SURGICAL SUPPLIES: HCPCS | Performed by: SURGERY

## 2024-01-31 PROCEDURE — 19301 PARTIAL MASTECTOMY: CPT | Performed by: SURGERY

## 2024-01-31 PROCEDURE — 88360 TUMOR IMMUNOHISTOCHEM/MANUAL: CPT | Performed by: PATHOLOGY

## 2024-01-31 PROCEDURE — 38792 RA TRACER ID OF SENTINL NODE: CPT | Performed by: SURGERY

## 2024-01-31 PROCEDURE — 88342 IMHCHEM/IMCYTCHM 1ST ANTB: CPT | Performed by: PATHOLOGY

## 2024-01-31 PROCEDURE — 3700000002 HC GENERAL ANESTHESIA TIME - EACH INCREMENTAL 1 MINUTE: Performed by: SURGERY

## 2024-01-31 PROCEDURE — 3600000008 HC OR TIME - EACH INCREMENTAL 1 MINUTE - PROCEDURE LEVEL THREE: Performed by: SURGERY

## 2024-01-31 RX ORDER — PROPOFOL 10 MG/ML
INJECTION, EMULSION INTRAVENOUS AS NEEDED
Status: DISCONTINUED | OUTPATIENT
Start: 2024-01-31 | End: 2024-01-31

## 2024-01-31 RX ORDER — ONDANSETRON HYDROCHLORIDE 2 MG/ML
INJECTION, SOLUTION INTRAVENOUS AS NEEDED
Status: DISCONTINUED | OUTPATIENT
Start: 2024-01-31 | End: 2024-01-31

## 2024-01-31 RX ORDER — ISOSULFAN BLUE 50 MG/5ML
INJECTION, SOLUTION SUBCUTANEOUS AS NEEDED
Status: DISCONTINUED | OUTPATIENT
Start: 2024-01-31 | End: 2024-01-31 | Stop reason: HOSPADM

## 2024-01-31 RX ORDER — FENTANYL CITRATE 50 UG/ML
25 INJECTION, SOLUTION INTRAMUSCULAR; INTRAVENOUS EVERY 5 MIN PRN
Status: DISCONTINUED | OUTPATIENT
Start: 2024-01-31 | End: 2024-01-31 | Stop reason: HOSPADM

## 2024-01-31 RX ORDER — FENTANYL CITRATE 50 UG/ML
INJECTION, SOLUTION INTRAMUSCULAR; INTRAVENOUS AS NEEDED
Status: DISCONTINUED | OUTPATIENT
Start: 2024-01-31 | End: 2024-01-31

## 2024-01-31 RX ORDER — SODIUM CHLORIDE, SODIUM LACTATE, POTASSIUM CHLORIDE, CALCIUM CHLORIDE 600; 310; 30; 20 MG/100ML; MG/100ML; MG/100ML; MG/100ML
100 INJECTION, SOLUTION INTRAVENOUS CONTINUOUS
Status: DISCONTINUED | OUTPATIENT
Start: 2024-01-31 | End: 2024-01-31 | Stop reason: HOSPADM

## 2024-01-31 RX ORDER — TRAMADOL HYDROCHLORIDE 50 MG/1
50 TABLET ORAL EVERY 6 HOURS PRN
Qty: 15 TABLET | Refills: 0 | Status: SHIPPED | OUTPATIENT
Start: 2024-01-31 | End: 2024-03-08 | Stop reason: ALTCHOICE

## 2024-01-31 RX ORDER — DEXAMETHASONE SODIUM PHOSPHATE 4 MG/ML
INJECTION, SOLUTION INTRA-ARTICULAR; INTRALESIONAL; INTRAMUSCULAR; INTRAVENOUS; SOFT TISSUE AS NEEDED
Status: DISCONTINUED | OUTPATIENT
Start: 2024-01-31 | End: 2024-01-31

## 2024-01-31 RX ORDER — ONDANSETRON HYDROCHLORIDE 2 MG/ML
4 INJECTION, SOLUTION INTRAVENOUS ONCE AS NEEDED
Status: DISCONTINUED | OUTPATIENT
Start: 2024-01-31 | End: 2024-01-31 | Stop reason: HOSPADM

## 2024-01-31 RX ORDER — KETOROLAC TROMETHAMINE 30 MG/ML
INJECTION, SOLUTION INTRAMUSCULAR; INTRAVENOUS AS NEEDED
Status: DISCONTINUED | OUTPATIENT
Start: 2024-01-31 | End: 2024-01-31

## 2024-01-31 RX ORDER — MIDAZOLAM HYDROCHLORIDE 1 MG/ML
INJECTION, SOLUTION INTRAMUSCULAR; INTRAVENOUS AS NEEDED
Status: DISCONTINUED | OUTPATIENT
Start: 2024-01-31 | End: 2024-01-31

## 2024-01-31 RX ORDER — DIPHENHYDRAMINE HYDROCHLORIDE 50 MG/ML
12.5 INJECTION INTRAMUSCULAR; INTRAVENOUS ONCE AS NEEDED
Status: DISCONTINUED | OUTPATIENT
Start: 2024-01-31 | End: 2024-01-31 | Stop reason: HOSPADM

## 2024-01-31 RX ORDER — LIDOCAINE HYDROCHLORIDE 20 MG/ML
INJECTION, SOLUTION INFILTRATION; PERINEURAL AS NEEDED
Status: DISCONTINUED | OUTPATIENT
Start: 2024-01-31 | End: 2024-01-31

## 2024-01-31 RX ORDER — MEPERIDINE HYDROCHLORIDE 25 MG/ML
12.5 INJECTION INTRAMUSCULAR; INTRAVENOUS; SUBCUTANEOUS EVERY 10 MIN PRN
Status: DISCONTINUED | OUTPATIENT
Start: 2024-01-31 | End: 2024-01-31 | Stop reason: HOSPADM

## 2024-01-31 RX ORDER — CEFAZOLIN 1 G/1
INJECTION, POWDER, FOR SOLUTION INTRAVENOUS AS NEEDED
Status: DISCONTINUED | OUTPATIENT
Start: 2024-01-31 | End: 2024-01-31

## 2024-01-31 RX ORDER — LABETALOL HYDROCHLORIDE 5 MG/ML
5 INJECTION, SOLUTION INTRAVENOUS ONCE AS NEEDED
Status: DISCONTINUED | OUTPATIENT
Start: 2024-01-31 | End: 2024-01-31 | Stop reason: HOSPADM

## 2024-01-31 RX ADMIN — SODIUM CHLORIDE, POTASSIUM CHLORIDE, SODIUM LACTATE AND CALCIUM CHLORIDE: 600; 310; 30; 20 INJECTION, SOLUTION INTRAVENOUS at 17:48

## 2024-01-31 RX ADMIN — FENTANYL CITRATE 50 MCG: 0.05 INJECTION, SOLUTION INTRAMUSCULAR; INTRAVENOUS at 16:52

## 2024-01-31 RX ADMIN — CEFAZOLIN 2 G: 330 INJECTION, POWDER, FOR SOLUTION INTRAMUSCULAR; INTRAVENOUS at 17:34

## 2024-01-31 RX ADMIN — PROPOFOL 100 MG: 10 INJECTION, EMULSION INTRAVENOUS at 16:49

## 2024-01-31 RX ADMIN — DEXAMETHASONE SODIUM PHOSPHATE 8 MG: 4 INJECTION, SOLUTION INTRA-ARTICULAR; INTRALESIONAL; INTRAMUSCULAR; INTRAVENOUS; SOFT TISSUE at 18:01

## 2024-01-31 RX ADMIN — TILMANOCEPT 0.5 MILLICURIE: KIT at 13:35

## 2024-01-31 RX ADMIN — MIDAZOLAM HYDROCHLORIDE 2 MG: 1 INJECTION, SOLUTION INTRAMUSCULAR; INTRAVENOUS at 16:33

## 2024-01-31 RX ADMIN — PROPOFOL 50 MG: 10 INJECTION, EMULSION INTRAVENOUS at 16:54

## 2024-01-31 RX ADMIN — KETOROLAC TROMETHAMINE 30 MG: 30 INJECTION, SOLUTION INTRAMUSCULAR at 17:28

## 2024-01-31 RX ADMIN — PROPOFOL 200 MG: 10 INJECTION, EMULSION INTRAVENOUS at 16:35

## 2024-01-31 RX ADMIN — SODIUM CHLORIDE, POTASSIUM CHLORIDE, SODIUM LACTATE AND CALCIUM CHLORIDE: 600; 310; 30; 20 INJECTION, SOLUTION INTRAVENOUS at 14:42

## 2024-01-31 RX ADMIN — LIDOCAINE HYDROCHLORIDE 50 MG: 20 INJECTION, SOLUTION INFILTRATION; PERINEURAL at 16:33

## 2024-01-31 RX ADMIN — ONDANSETRON HYDROCHLORIDE 4 MG: 2 INJECTION INTRAMUSCULAR; INTRAVENOUS at 17:50

## 2024-01-31 RX ADMIN — FENTANYL CITRATE 100 MCG: 0.05 INJECTION, SOLUTION INTRAMUSCULAR; INTRAVENOUS at 16:33

## 2024-01-31 RX ADMIN — PROPOFOL 50 MG: 10 INJECTION, EMULSION INTRAVENOUS at 17:22

## 2024-01-31 SDOH — HEALTH STABILITY: MENTAL HEALTH: CURRENT SMOKER: 0

## 2024-01-31 ASSESSMENT — PAIN SCALES - GENERAL
PAINLEVEL_OUTOF10: 5 - MODERATE PAIN
PAINLEVEL_OUTOF10: 0 - NO PAIN
PAINLEVEL_OUTOF10: 0 - NO PAIN
PAINLEVEL_OUTOF10: 6
PAIN_LEVEL: 3
PAINLEVEL_OUTOF10: 6

## 2024-01-31 ASSESSMENT — COLUMBIA-SUICIDE SEVERITY RATING SCALE - C-SSRS
1. IN THE PAST MONTH, HAVE YOU WISHED YOU WERE DEAD OR WISHED YOU COULD GO TO SLEEP AND NOT WAKE UP?: NO
2. HAVE YOU ACTUALLY HAD ANY THOUGHTS OF KILLING YOURSELF?: NO
6. HAVE YOU EVER DONE ANYTHING, STARTED TO DO ANYTHING, OR PREPARED TO DO ANYTHING TO END YOUR LIFE?: NO

## 2024-01-31 ASSESSMENT — PAIN - FUNCTIONAL ASSESSMENT
PAIN_FUNCTIONAL_ASSESSMENT: 0-10

## 2024-01-31 NOTE — OP NOTE
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.912, C79.89] Post-op Diagnosis     * Chest wall recurrence of breast cancer, left (CMS/HCC) [C50.912, C79.89]     Procedures  Lumpectomy/wide excision of breast cancer  34084 - HI MASTECTOMY PARTIAL    Axillary sentinel lymph node biopsy and excision of axillary lymph node with Magseed localization  23096 - HI BX/EXC LYMPH NODE OPEN DEEP AXILLARY NODE      Surgeons      * Renee Pugh - Primary    Resident/Fellow/Other Assistant:  Kiersten Barakat SA; EYAL Conn    Procedure Summary  Anesthesia: General  ASA: II  Anesthesia Staff: Anesthesiologist: Joe Tobias DO  Estimated Blood Loss: 5 mL  Intra-op Medications: Administrations occurring from 1515 to 1640 on 24:  * No intraprocedure medications in log *           Anesthesia Record               Intraprocedure I/O Totals          Intake    lactated Ringer's infusion 1000.00 mL    Total Intake 1000 mL          Specimen:   ID Type Source Tests Collected by Time   1 : L axillary sentinel lymph node with #1 with magseed target thgfp=011 Tissue AXILLARY LYMPH NODE BIOPSY LEFT - BREAST SURGICAL PATHOLOGY EXAM Renee Pugh MD 2024 1656   2 : L axillary sentinel lymph node #2 with magseed target count= Tissue AXILLARY LYMPH NODE BIOPSY LEFT - BREAST SURGICAL PATHOLOGY EXAM Renee Pugh MD 2024 1706   3 : L axillary sentinel lymph node #3 target count=3 Tissue AXILLARY LYMPH NODE BIOPSY LEFT - BREAST SURGICAL PATHOLOGY EXAM Renee Pugh MD 2024 1727   4 : left lumpectomy/chest wall excision 12:00 position, short superior, long lateral Tissue BREAST LUMPECTOMY LEFT SURGICAL  PATHOLOGY EXAM Renee Pugh MD 1/31/2024 5427   5 : ADDITIONAL TISSUE LEFT LUMPECTOMY 12:00 POSITION. MEDIAL INFERIOR MARGIN SHORT STITCH SUPERIOR, LONG LATERAL WITH BLACK STITCH. NEW MEDIAL MARGIN YELLOW. NEW INFERIOR MARGIN BLUE. Tissue BREAST LUMPECTOMY LEFT SURGICAL PATHOLOGY EXAM Renee Pugh MD 1/31/2024 1750        Staff:   Circulator: Terri Craft RN  Relief Circulator: Gabo Tena RN  Relief Scrub: Bartolo Marc RN  Scrub Person: Selena Chapman         Drains and/or Catheters: none  Tourniquet Times: n/a        Implants: none    Findings: palpable mass 12:00, left    Indications: Kirsty Shrestha is an 33 y.o. female who is having surgery for Chest wall recurrence of breast cancer, left (CMS/[facility]) [C50.912, C79.89].     The patient was seen in the preoperative area. The risks, benefits, complications, treatment options, non-operative alternatives, expected recovery and outcomes were discussed with the patient. The possibilities of reaction to medication, pulmonary aspiration, injury to surrounding structures, bleeding, recurrent infection, the need for additional procedures, failure to diagnose a condition, and creating a complication requiring transfusion or operation were discussed with the patient. The patient concurred with the proposed plan, giving informed consent.  The site of surgery was properly noted/marked if necessary per policy. The patient has been actively warmed in preoperative area. Preoperative antibiotics have been ordered and given within 1 hours of incision. Venous thrombosis prophylaxis have been ordered including bilateral sequential compression devices    Procedure Details:   Operative indications: The patient was diagnosed with left breast cancer in 2022.  She underwent a bilateral mastectomy with implant reconstruction and left sentinel lymph node biopsy.  The patient found a mass in the upper left breast. A core biopsy showed invasive ductal carcinoma.   She also had 2 abnormal left axillary lymph nodes which were too deep to be biopsied by core biopsy and therefore Magseed's were placed in them.  Surgical options were reviewed in detail with the patient. The patient chose to proceed with a left lumpectomy/excision of chest wall recurrence, excision of the 2 abnormal lymph nodes with Magseeds and left axillary sentinel lymph node biopsy. The risks, benefits and procedure were discussed with the patient including bleeding, infection, scar tissue formation, decreased function, mobility, or strength of the upper extremity, pain and numbness of the axilla and upper arm, lymphedema, damage to the implant and general anesthesia. She understood all risks and agreed to proceed.     Operative report: The patient was taken to the operating room and placed on the table in the supine position. A timeout was performed. The site was marked preoperatively. She received general anesthesia and IV antibiotics without complication. Prior to coming into the operating room, she had the injection of technetium 99m tilmanocept lymphoseek into the left breast by a radiologist. Once in the operating room and after anesthesia was obtained, the patient had the injection of 4 cc of lymphazurin into the upper outer aspect of the left breast and massaged into the breast for 5 minutes. The patient was prepped and draped in the usual sterile fashion. The left axillary sentinel lymph node biopsy was performed.  Local anesthesia was obtained and then an incision was made with a 15 blade scalpel in the left axilla inferior to the axillary hairline. Dissection was continued with the scalpel. The clavicopectoral fascia was incised, gaining entrance into the axilla. The long thoracic nerve and thoracodorsal nerve were identified in order to avoid injury. The neoprobe was used to detect activity in the axilla.  The Sentimag probe was used to identify the first Magseed in the axilla.  This lymph node was  grasped with an Allis clamp and  from the surrounding tissue.  Small lymphatics and vessels were ligated with the Thunderbeat.  This lymph node was identified as left axillary sentinel lymph node #1 with Magseed.  Target count was 168.  It was sent to radiology and a specimen radiograph was taken which I reviewed and personally confirmed that the lymph node and the Magseed were present in the tissue specimen.  The specimen was then sent to pathology.  The Sentimag probe was used to identify the second Magseed in the axilla.  This lymph node was grasped with an Allis clamp and  from the surrounding tissue.  Small lymphatics and vessels were ligated with the Thunderbeat.  This lymph node was identified as left axillary sentinel lymph node #2 with Magseed.  Target count was 3.  It was sent to radiology and a specimen radiograph was taken which I reviewed and personally confirmed that the Magseed were present in the tissue specimen.  The specimen was then sent to pathology.  The neoprobe was used to detect activity in the axilla.  A lymph node was identified.  The lymph node was  from the surrounding tissue and small lymphatics and vessels were ligated with a Thunderbeat This lymph node was identified as left axillary sentinel lymph node #3. Target count was 3.  The lymph node was then sent to pathology.  At this point, there were no other blue stained lymph nodes, no further activity noted with the neoprobe and no abnormally palpated lymph nodes. A subcutaneous layer was closed with interrupted 3-0 Vicryl stitches. The skin was closed with a running, subcuticular 4-0 monocryl stitch. Next, the lumpectomy was performed. Local anesthesia was obtained and then an elliptical incision was made in the upper left reconstructed breast.  The skin overlying the palpable mass was excised with the specimen.  Dissection continued with Metzenbaum scissors and scalpel.  The entire mass and surrounding area  of tissue was excised.  The posterior aspect of the dissection was at the implant capsule and a portion of the implant capsule was excised with the palpable mass.  The specimen was labeled with a short stitch superiorly and a long stitch laterally.  The specimen was labeled as left lumpectomy breast tissue 12:00 and painted with the vector surgical margin marker kit. The specimen was sent to pathology.  The palpable mass appeared to be close to the inferior medial aspect of the tissue and therefore additional tissue was taken from the entire inferior medial aspect of the cavity.  The specimen was labeled with a short stitch superiorly and a long stitch laterally.  It was identified as additional left lumpectomy tissue 12:00 inferior medial margin.  The new inferior margin was painted blue and the new medial margin was painted yellow.  The implant capsule was reapproximated with running 3-0 Vicryl suture.  The implant appeared to be intact however there was fluid surrounding the implant when the capsule was opened.  Hemostasis was achieved with Bovie electrocautery. After adequate hemostasis, the wound was irrigated and the irrigation fluid was suctioned out. A superficial, subcutaneous layer was closed with interrupted 3-0 Vicryl stitches. The skin was closed with a running subcuticular 4-0 monocryl stitch. Steri-Strips were placed followed by fluffs and a Surgi-Bra. She tolerated the procedure well and was transferred to PACU in stable condition.   Complications:  None; patient tolerated the procedure well.    Disposition: PACU - hemodynamically stable.  Condition: stable     This procedure was not performed to treat primary cutaneous melanoma through wide local excision      Additional Details:   [place] Node Biopsy for Breast Cancer - Left  Operation performed with curative intent Yes   Tracer(s) used to identify sentinel nodes in the upfront surgery (non-neoadjuvant) setting Radioactive tracer and dye    Tracer(s) used to identify sentinel nodes in the neoadjuvant setting N/A   All nodes (colored or non-colored) present at the end of a dye-filled lymphatic channel were removed Yes   All significantly radioactive nodes were removed Yes   All palpably suspicious nodes were removed Yes   Biopsy-proven positive nodes marked with clips prior to chemotherapy were identified and removed N/A       Attending Attestation: I performed the procedure.    Renee Pugh  Phone Number: [phone]

## 2024-01-31 NOTE — ANESTHESIA PROCEDURE NOTES
Airway  Date/Time: 1/31/2024 4:36 PM  Urgency: elective    Airway not difficult    Staffing  Performed: attending   Authorized by: Joe Tobias DO    Performed by: PUSHPA Ahuja-KAREEM  Patient location during procedure: OR    Indications and Patient Condition  Indications for airway management: anesthesia  Spontaneous Ventilation: absent  Sedation level: deep  Preoxygenated: yes  Patient position: sniffing  MILS maintained throughout  Mask difficulty assessment: 1 - vent by mask  Planned trial extubation    Final Airway Details  Final airway type: supraglottic airway      Successful airway: Size 4     Number of attempts at approach: 1  Ventilation between attempts: BVM and none  Number of other approaches attempted: 0    Additional Comments  MD DARY
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
## 2024-01-31 NOTE — ANESTHESIA PREPROCEDURE EVALUATION
Patient: Kirsty Shrestha    Procedure Information       Date/Time: 01/31/24 0525    Procedures:       Lumpectomy/wide excision of breast cancer (Left) - Nuclear medicine injection, Lymphazurin, Sentimag probe, neoprobe  *NM Inj @ 1330*      Axillary sentinel lymph node biopsy and excision of axillary lymph node with Magseed localization (Left)    Location: [facility] / [facility]    Surgeons: Renee Pugh MD            Relevant Problems   Cardiovascular   (+) Breast pain   (+) Hypertension complicating pregnancy      Endocrine   (+) Obesity      GI   (+) Esophageal reflux      Neuro/Psych   (+) Anxiety   (+) Carpal tunnel syndrome   (+) Chronic lumbar radiculopathy   (+) Depression      Pulmonary   (+) CHRISTA (obstructive sleep apnea)      Musculoskeletal   (+) Carpal tunnel syndrome      Infectious Disease   (+) Bacterial vaginosis   (+) Hidradenitis suppurativa of left axilla   (+) Infection due to Chlamydia species   (+) Onychomycosis of toenail   (+) Tinea pedis of both feet       Clinical information reviewed:   Tobacco  Allergies  Meds   Med Hx  Surg Hx   Fam Hx  Soc Hx        NPO Detail:  NPO/Void Status  Carbohydrate Drink Given Prior to Surgery? : N  Date of Last Liquid: 01/31/24  Time of Last Liquid: 0900  Date of Last Solid: 01/30/24  Time of Last Solid: 2330  Last Intake Type: Clear fluids  Time of Last Void: 1100         Physical Exam    Airway  Mallampati: II  TM distance: >3 FB  Neck ROM: full     Cardiovascular   Rate: normal     Dental    Pulmonary   Breath sounds clear to auscultation     Abdominal          Anesthesia Plan    History of general anesthesia?: yes  History of complications of general anesthesia?: no    ASA 2     general     The patient is not a current smoker.  Patient was previously instructed to abstain from smoking on day of procedure.  Patient did not smoke on day of procedure.    intravenous induction   Postoperative administration of opioids is intended.  Anesthetic plan  and risks discussed with patient.    Plan discussed with attending.
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
## 2024-02-01 NOTE — ANESTHESIA POSTPROCEDURE EVALUATION
Patient: Kirsty Shrestha    Procedure Summary       Date: 01/31/24 Room / Location: [facility]    Anesthesia Start: 1627 Anesthesia Stop: 1808    Procedures:       Lumpectomy/wide excision of breast cancer (Left: Breast)      Axillary sentinel lymph node biopsy and excision of axillary lymph node with Magseed localization (Left: Axilla) Diagnosis:       Chest wall recurrence of breast cancer, left (CMS/HCC)      (Chest wall recurrence of breast cancer, left (CMS/HCC) [C50.912, C79.89])    Surgeons: Renee Pugh MD Responsible Provider: Joe Tobias DO    Anesthesia Type: general ASA Status: 2            Anesthesia Type: general    Vitals Value Taken Time   /74 01/31/24 1835   Temp 36 °C (96.8 °F) 01/31/24 1811   Pulse 73 01/31/24 1836   Resp 16 01/31/24 1836   SpO2 95 % 01/31/24 1836   Vitals shown include unvalidated device data.    Anesthesia Post Evaluation    Patient location during evaluation: PACU  Patient participation: complete - patient participated  Level of consciousness: awake and alert  Pain score: 3  Pain management: satisfactory to patient  Airway patency: patent  Cardiovascular status: hemodynamically stable  Respiratory status: acceptable  Hydration status: acceptable  Postoperative Nausea and Vomiting: none  Comments: PONV absent        There were no known notable events for this encounter.

## 2024-02-02 DIAGNOSIS — C50.912 MALIGNANT NEOPLASM OF LEFT FEMALE BREAST, UNSPECIFIED ESTROGEN RECEPTOR STATUS, UNSPECIFIED SITE OF BREAST (MULTI): Primary | ICD-10-CM

## 2024-02-02 RX ORDER — OXYCODONE AND ACETAMINOPHEN 5; 325 MG/1; MG/1
1 TABLET ORAL EVERY 6 HOURS PRN
Qty: 15 TABLET | Refills: 0 | Status: SHIPPED | OUTPATIENT
Start: 2024-02-02 | End: 2024-02-09

## 2024-02-05 DIAGNOSIS — C50.912 CHEST WALL RECURRENCE OF BREAST CANCER, LEFT (MULTI): ICD-10-CM

## 2024-02-05 DIAGNOSIS — C79.89 CHEST WALL RECURRENCE OF BREAST CANCER, LEFT (MULTI): ICD-10-CM

## 2024-02-08 ENCOUNTER — TUMOR BOARD CONFERENCE (OUTPATIENT)
Dept: HEMATOLOGY/ONCOLOGY | Facility: HOSPITAL | Age: 34
End: 2024-02-08
Payer: COMMERCIAL

## 2024-02-09 ENCOUNTER — OFFICE VISIT (OUTPATIENT)
Dept: SURGICAL ONCOLOGY | Facility: CLINIC | Age: 34
End: 2024-02-09
Payer: COMMERCIAL

## 2024-02-09 VITALS — TEMPERATURE: 98.2 F

## 2024-02-09 DIAGNOSIS — C79.89 CHEST WALL RECURRENCE OF BREAST CANCER, LEFT (MULTI): Primary | ICD-10-CM

## 2024-02-09 DIAGNOSIS — C50.912 CHEST WALL RECURRENCE OF BREAST CANCER, LEFT (MULTI): Primary | ICD-10-CM

## 2024-02-09 PROCEDURE — 1036F TOBACCO NON-USER: CPT | Performed by: SURGERY

## 2024-02-09 PROCEDURE — 99024 POSTOP FOLLOW-UP VISIT: CPT | Performed by: SURGERY

## 2024-02-09 PROCEDURE — 3008F BODY MASS INDEX DOCD: CPT | Performed by: SURGERY

## 2024-02-09 NOTE — PROGRESS NOTES
Subjective   Kirsty Shrestha is a 33 y.o. female here for a postoperative visit.  She had a left lumpectomy and sentinel lymph node biopsy on January 31, 2024.  The pathology is pending.  She is having some pain mostly in the axilla.    She had some concerns about drainage from the incision and area that may have been opening up.  We asked her to come in today to be evaluated.  No fevers or chills.      Objective     Physical Exam  Chest:          Comments: Both incisions are healing well.  There is no drainage from either incision.  Steri-Strips were removed and the wound was examined.  There is no open area from the incision.  No erythema or fluctuance.  Moderate ecchymosis which is resolving.      Alert and oriented.      Assessment/Plan   Recurrent left breast cancer diagnosed in December 2023.  Initial left breast cancer diagnosed in April 2022.  Invasive ductal carcinoma, grade 3; ER greater than 95%, KS greater than 95%, HER2 negative     No evidence of infection.  Follow-up next week as scheduled.  I will call you when the pathology results are back.      Renee Pugh MD

## 2024-02-12 NOTE — PROGRESS NOTES
Flex Shrestha is a 33 y.o. female here for a postoperative visit.  She had a left lumpectomy and sentinel lymph node biopsy on January 31, 2024.   She is having tightness in her left arm and axilla.    Findings at that time were the following:   Tumor size: multiple (3.2 cm, 1.1 cm, 0.7 cm, 0.4 cm )  Tumor thgthrthathdtheth:th th4th Estrogen Receptor: >95%   Progesterone Receptor: >95%   Her-2 contreras: neg   Lymph node status: 0/1   Lymphovascular invasion: Extensive    Cancer History:   Treatment Synopsis:    Diagnosis: April 2022 Invasive ductal carcinoma of the left breast; ER >95%, NM >95%, Her2-negative  Stage: IA (pT1b pN0)  Mammaprint: High-risk, luminal B, -0.615     -Palpable lump in left breast.  -4/8/22: Dx MG/US performed. A 1.3 x 0.6 x 0.7 cm hypoechoic mass seen in left breast (11:00, 13 cm FN). An additional 0.9 cm mass at 2:00 (8 cm FN) was also seen, possibly benign. ALN’s appeared unremarkable.  -4/20/22: Left breast mass biopsy (11:00) showed IDC, grade 3. ER >95%, NM >95%, Her2-negative (2+ IHC; MAHNAZ ratio 1.9, copy #3.6). Mammaprint high-risk (-0.615), luminal B type. Maximum diameter 0.7 cm.  -4/28/22: Biopsy of 2:00 lesion benign. Left ALN negative.  -7/14/22: Bilateral mastectomy with implant reconstruction and left SLNBx performed. No residual carcinoma seen in left breast. Right breast benign. 0/1 left SLN’s involved. pT1bN0 (IA).    December 2023 Invasive ductal carcinoma, grade 3; ER greater than 95%, NM greater than 95%, HER2 negative       Objective     Physical Exam  Chest:          Comments: Both incisions are healing well with no evidence of infection, seroma or hematoma.  There is some ecchymosis and swelling in the upper left breast.      Alert and oriented.      Assessment/Plan   Stage IIA recurrent left breast cancer   -[id number]   Invasive ductal carcinoma, grade 3; ER greater than 95%, NM greater than 95%, HER2 negative    Pathology was reviewed.  There is a positive lateral margin.     Re-Excision lumpectomy scheduled.    Recommendations from interdisciplinary breast tumor conference were reviewed with the patient.  Recommendations were for reexcision of the lateral margin, medical oncology referral (Dr. Fitch) and radiation treatment.    Initial left breast cancer diagnosed in April 2022.  Recurrent multifocal left breast cancer diagnosed in December 2023.  Invasive ductal carcinoma, grade 3; ER greater than 95%, AZ greater than 95%, HER2 negative        Renee Pugh MD

## 2024-02-13 DIAGNOSIS — C50.912 CHEST WALL RECURRENCE OF BREAST CANCER, LEFT (MULTI): ICD-10-CM

## 2024-02-13 DIAGNOSIS — C79.89 CHEST WALL RECURRENCE OF BREAST CANCER, LEFT (MULTI): ICD-10-CM

## 2024-02-15 ENCOUNTER — TUMOR BOARD CONFERENCE (OUTPATIENT)
Dept: HEMATOLOGY/ONCOLOGY | Facility: HOSPITAL | Age: 34
End: 2024-02-15
Payer: COMMERCIAL

## 2024-02-15 PROCEDURE — 88342 IMHCHEM/IMCYTCHM 1ST ANTB: CPT | Performed by: PATHOLOGY

## 2024-02-15 PROCEDURE — 88368 INSITU HYBRIDIZATION MANUAL: CPT | Performed by: PATHOLOGY

## 2024-02-15 PROCEDURE — 88341 IMHCHEM/IMCYTCHM EA ADD ANTB: CPT | Performed by: PATHOLOGY

## 2024-02-15 NOTE — TUMOR BOARD NOTE
MULTIDISCIPLINARY BREAST CANCER TUMOR BOARD CONFERENCE NOTE  Kirsty Shrestha was presented at Breast Cancer Tumor Board Conference  Conference date: 2/15/2024  Presenting Provider(s): Dr. Renee Pugh  Present at Conference: Medical Oncology, Radiation Oncology, Surgical Oncology, Radiology, and Pathology Representatives  Conference Review Type: Pathology Review    Tumor Board Stage:  rp T2N0 M0  Breast Cancer Stage IB    National Guidelines discussed: Yes    Systemic therapy: Yes, describe: consideration of adjuvant chemotherapy and recommend endocrine therapy.  Radiation therapy: Yes, describe: recommend postoperative radiation  Surgical Resection: Yes. Left lumpectomy with left axillary SLNB. Margin review: invasive carcinoma present at lateral margin, within microns of posterior margin, and <1mm from anterior/medial margin in main specimen.  Recommend re-excision of lateral margin.  History of bilateral mastectomy with implant reconstruction in 7/2022.  Genomic Testing: no  Clinical Trial Eligible: no  Genetics: negative for actionable mutation 5/9/2022.    Recommendations: Recommend re-excision of lateral margin. Referral to Medical Oncology for adjuvant endocrine therapy and consideration of chemotherapy. Referral to Radiation Oncology for postoperative radiation.    Referral Recommendations:  Radiation Oncology and Medical Oncology    Cancer Staging:  Cancer Staging   No matching staging information was found for the patient.   Disclaimer  S[facility] tumor board recommendations represent the consensus opinion of physicians present at a weekly patient care conference. The treating [facility] physician is not always present, and many of the physicians formulating the recommendation have not personally seen or examined the patient under discussion. It is understood that the treating [facility] physician considers the expertise of the Tumor Board Recommendation in formulating his/her plan for the patient. However, in many situations,  based on individualized patient considerations, a different plan is determined by the treating physician to be the optimal medical management.

## 2024-02-16 ENCOUNTER — PREP FOR PROCEDURE (OUTPATIENT)
Dept: SURGICAL ONCOLOGY | Facility: CLINIC | Age: 34
End: 2024-02-16
Payer: COMMERCIAL

## 2024-02-16 ENCOUNTER — OFFICE VISIT (OUTPATIENT)
Dept: SURGICAL ONCOLOGY | Facility: CLINIC | Age: 34
End: 2024-02-16
Payer: COMMERCIAL

## 2024-02-16 DIAGNOSIS — Z17.0 MALIGNANT NEOPLASM OF LEFT BREAST IN FEMALE, ESTROGEN RECEPTOR POSITIVE, UNSPECIFIED SITE OF BREAST (MULTI): Primary | ICD-10-CM

## 2024-02-16 DIAGNOSIS — C50.912 MALIGNANT NEOPLASM OF LEFT BREAST IN FEMALE, ESTROGEN RECEPTOR POSITIVE, UNSPECIFIED SITE OF BREAST (MULTI): Primary | ICD-10-CM

## 2024-02-16 LAB
LAB AP ASR DISCLAIMER: NORMAL
LABORATORY COMMENT REPORT: NORMAL
PATH REPORT.ADDENDUM SPEC: NORMAL
PATH REPORT.ADDENDUM SPEC: NORMAL
PATH REPORT.FINAL DX SPEC: NORMAL
PATH REPORT.GROSS SPEC: NORMAL
PATH REPORT.RELEVANT HX SPEC: NORMAL
PATH REPORT.TOTAL CANCER: NORMAL
PATHOLOGY SYNOPTIC REPORT: NORMAL

## 2024-02-16 PROCEDURE — 3008F BODY MASS INDEX DOCD: CPT | Performed by: SURGERY

## 2024-02-16 PROCEDURE — 99024 POSTOP FOLLOW-UP VISIT: CPT | Performed by: SURGERY

## 2024-02-16 PROCEDURE — 1036F TOBACCO NON-USER: CPT | Performed by: SURGERY

## 2024-02-16 RX ORDER — SODIUM CHLORIDE, SODIUM LACTATE, POTASSIUM CHLORIDE, CALCIUM CHLORIDE 600; 310; 30; 20 MG/100ML; MG/100ML; MG/100ML; MG/100ML
100 INJECTION, SOLUTION INTRAVENOUS CONTINUOUS
Status: CANCELLED | OUTPATIENT
Start: 2024-02-21

## 2024-02-21 ENCOUNTER — TELEPHONE (OUTPATIENT)
Dept: SURGICAL ONCOLOGY | Facility: HOSPITAL | Age: 34
End: 2024-02-21

## 2024-02-21 ENCOUNTER — HOSPITAL ENCOUNTER (OUTPATIENT)
Facility: HOSPITAL | Age: 34
Setting detail: OUTPATIENT SURGERY
Discharge: HOME | End: 2024-02-21
Attending: SURGERY | Admitting: SURGERY
Payer: COMMERCIAL

## 2024-02-21 ENCOUNTER — ANESTHESIA EVENT (OUTPATIENT)
Dept: OPERATING ROOM | Facility: HOSPITAL | Age: 34
End: 2024-02-21
Payer: COMMERCIAL

## 2024-02-21 ENCOUNTER — ANESTHESIA (OUTPATIENT)
Dept: OPERATING ROOM | Facility: HOSPITAL | Age: 34
End: 2024-02-21
Payer: COMMERCIAL

## 2024-02-21 ENCOUNTER — PHARMACY VISIT (OUTPATIENT)
Dept: PHARMACY | Facility: CLINIC | Age: 34
End: 2024-02-21
Payer: MEDICAID

## 2024-02-21 VITALS
TEMPERATURE: 96.8 F | DIASTOLIC BLOOD PRESSURE: 82 MMHG | OXYGEN SATURATION: 96 % | WEIGHT: 210 LBS | BODY MASS INDEX: 38.64 KG/M2 | SYSTOLIC BLOOD PRESSURE: 136 MMHG | RESPIRATION RATE: 16 BRPM | HEART RATE: 85 BPM | HEIGHT: 62 IN

## 2024-02-21 DIAGNOSIS — C79.89 CHEST WALL RECURRENCE OF BREAST CANCER, LEFT (MULTI): Primary | ICD-10-CM

## 2024-02-21 DIAGNOSIS — Z17.0 MALIGNANT NEOPLASM OF LEFT BREAST IN FEMALE, ESTROGEN RECEPTOR POSITIVE, UNSPECIFIED SITE OF BREAST (MULTI): ICD-10-CM

## 2024-02-21 DIAGNOSIS — C50.912 CHEST WALL RECURRENCE OF BREAST CANCER, LEFT (MULTI): Primary | ICD-10-CM

## 2024-02-21 DIAGNOSIS — C50.912 MALIGNANT NEOPLASM OF LEFT BREAST IN FEMALE, ESTROGEN RECEPTOR POSITIVE, UNSPECIFIED SITE OF BREAST (MULTI): ICD-10-CM

## 2024-02-21 PROCEDURE — 7100000009 HC PHASE TWO TIME - INITIAL BASE CHARGE: Performed by: SURGERY

## 2024-02-21 PROCEDURE — 3700000002 HC GENERAL ANESTHESIA TIME - EACH INCREMENTAL 1 MINUTE: Performed by: SURGERY

## 2024-02-21 PROCEDURE — 2500000005 HC RX 250 GENERAL PHARMACY W/O HCPCS: Performed by: ANESTHESIOLOGIST ASSISTANT

## 2024-02-21 PROCEDURE — 19330 RMVL RUPTURED BREAST IMPLANT: CPT | Performed by: SURGERY

## 2024-02-21 PROCEDURE — 88300 SURGICAL PATH GROSS: CPT | Performed by: PATHOLOGY

## 2024-02-21 PROCEDURE — A19301 PR MASTECTOMY, PARTIAL: Performed by: ANESTHESIOLOGY

## 2024-02-21 PROCEDURE — 19301 PARTIAL MASTECTOMY: CPT | Performed by: PHYSICIAN ASSISTANT

## 2024-02-21 PROCEDURE — 7100000002 HC RECOVERY ROOM TIME - EACH INCREMENTAL 1 MINUTE: Performed by: SURGERY

## 2024-02-21 PROCEDURE — 88307 TISSUE EXAM BY PATHOLOGIST: CPT | Performed by: PATHOLOGY

## 2024-02-21 PROCEDURE — 19301 PARTIAL MASTECTOMY: CPT | Performed by: SURGERY

## 2024-02-21 PROCEDURE — 3600000009 HC OR TIME - EACH INCREMENTAL 1 MINUTE - PROCEDURE LEVEL FOUR: Performed by: SURGERY

## 2024-02-21 PROCEDURE — 2500000004 HC RX 250 GENERAL PHARMACY W/ HCPCS (ALT 636 FOR OP/ED): Performed by: ANESTHESIOLOGIST ASSISTANT

## 2024-02-21 PROCEDURE — 88307 TISSUE EXAM BY PATHOLOGIST: CPT | Mod: TC | Performed by: SURGERY

## 2024-02-21 PROCEDURE — A19301 PR MASTECTOMY, PARTIAL: Performed by: ANESTHESIOLOGIST ASSISTANT

## 2024-02-21 PROCEDURE — A4649 SURGICAL SUPPLIES: HCPCS | Performed by: SURGERY

## 2024-02-21 PROCEDURE — 3600000004 HC OR TIME - INITIAL BASE CHARGE - PROCEDURE LEVEL FOUR: Performed by: SURGERY

## 2024-02-21 PROCEDURE — 3700000001 HC GENERAL ANESTHESIA TIME - INITIAL BASE CHARGE: Performed by: SURGERY

## 2024-02-21 PROCEDURE — 7100000001 HC RECOVERY ROOM TIME - INITIAL BASE CHARGE: Performed by: SURGERY

## 2024-02-21 PROCEDURE — 2500000005 HC RX 250 GENERAL PHARMACY W/O HCPCS: Performed by: SURGERY

## 2024-02-21 PROCEDURE — 7100000010 HC PHASE TWO TIME - EACH INCREMENTAL 1 MINUTE: Performed by: SURGERY

## 2024-02-21 PROCEDURE — RXMED WILLOW AMBULATORY MEDICATION CHARGE

## 2024-02-21 PROCEDURE — 2720000007 HC OR 272 NO HCPCS: Performed by: SURGERY

## 2024-02-21 PROCEDURE — 2500000004 HC RX 250 GENERAL PHARMACY W/ HCPCS (ALT 636 FOR OP/ED): Performed by: ANESTHESIOLOGY

## 2024-02-21 PROCEDURE — 2500000004 HC RX 250 GENERAL PHARMACY W/ HCPCS (ALT 636 FOR OP/ED): Performed by: SURGERY

## 2024-02-21 RX ORDER — FENTANYL CITRATE 50 UG/ML
25 INJECTION, SOLUTION INTRAMUSCULAR; INTRAVENOUS EVERY 5 MIN PRN
Status: DISCONTINUED | OUTPATIENT
Start: 2024-02-21 | End: 2024-02-21 | Stop reason: HOSPADM

## 2024-02-21 RX ORDER — CEFAZOLIN SODIUM 2 G/100ML
2 INJECTION, SOLUTION INTRAVENOUS ONCE
Status: COMPLETED | OUTPATIENT
Start: 2024-02-21 | End: 2024-02-21

## 2024-02-21 RX ORDER — FENTANYL CITRATE 50 UG/ML
INJECTION, SOLUTION INTRAMUSCULAR; INTRAVENOUS AS NEEDED
Status: DISCONTINUED | OUTPATIENT
Start: 2024-02-21 | End: 2024-02-21

## 2024-02-21 RX ORDER — SODIUM CHLORIDE, SODIUM LACTATE, POTASSIUM CHLORIDE, CALCIUM CHLORIDE 600; 310; 30; 20 MG/100ML; MG/100ML; MG/100ML; MG/100ML
100 INJECTION, SOLUTION INTRAVENOUS CONTINUOUS
Status: DISCONTINUED | OUTPATIENT
Start: 2024-02-21 | End: 2024-02-21 | Stop reason: HOSPADM

## 2024-02-21 RX ORDER — PROPOFOL 10 MG/ML
INJECTION, EMULSION INTRAVENOUS AS NEEDED
Status: DISCONTINUED | OUTPATIENT
Start: 2024-02-21 | End: 2024-02-21

## 2024-02-21 RX ORDER — OXYCODONE AND ACETAMINOPHEN 5; 325 MG/1; MG/1
1 TABLET ORAL EVERY 6 HOURS PRN
Qty: 16 TABLET | Refills: 0 | Status: SHIPPED | OUTPATIENT
Start: 2024-02-21 | End: 2024-03-08 | Stop reason: ALTCHOICE

## 2024-02-21 RX ORDER — BUPIVACAINE HYDROCHLORIDE 5 MG/ML
INJECTION, SOLUTION PERINEURAL AS NEEDED
Status: DISCONTINUED | OUTPATIENT
Start: 2024-02-21 | End: 2024-02-21 | Stop reason: HOSPADM

## 2024-02-21 RX ORDER — MIDAZOLAM HYDROCHLORIDE 1 MG/ML
INJECTION, SOLUTION INTRAMUSCULAR; INTRAVENOUS AS NEEDED
Status: DISCONTINUED | OUTPATIENT
Start: 2024-02-21 | End: 2024-02-21

## 2024-02-21 RX ORDER — FENTANYL CITRATE 50 UG/ML
50 INJECTION, SOLUTION INTRAMUSCULAR; INTRAVENOUS EVERY 5 MIN PRN
Status: DISCONTINUED | OUTPATIENT
Start: 2024-02-21 | End: 2024-02-21 | Stop reason: HOSPADM

## 2024-02-21 RX ORDER — LIDOCAINE HYDROCHLORIDE AND EPINEPHRINE 10; 10 MG/ML; UG/ML
INJECTION, SOLUTION INFILTRATION; PERINEURAL AS NEEDED
Status: DISCONTINUED | OUTPATIENT
Start: 2024-02-21 | End: 2024-02-21 | Stop reason: HOSPADM

## 2024-02-21 RX ORDER — LIDOCAINE HYDROCHLORIDE 10 MG/ML
INJECTION INFILTRATION; PERINEURAL AS NEEDED
Status: DISCONTINUED | OUTPATIENT
Start: 2024-02-21 | End: 2024-02-21

## 2024-02-21 RX ORDER — DEXAMETHASONE SODIUM PHOSPHATE 4 MG/ML
INJECTION, SOLUTION INTRA-ARTICULAR; INTRALESIONAL; INTRAMUSCULAR; INTRAVENOUS; SOFT TISSUE AS NEEDED
Status: DISCONTINUED | OUTPATIENT
Start: 2024-02-21 | End: 2024-02-21

## 2024-02-21 RX ORDER — ONDANSETRON HYDROCHLORIDE 2 MG/ML
INJECTION, SOLUTION INTRAVENOUS AS NEEDED
Status: DISCONTINUED | OUTPATIENT
Start: 2024-02-21 | End: 2024-02-21

## 2024-02-21 RX ADMIN — FENTANYL CITRATE 50 MCG: 0.05 INJECTION, SOLUTION INTRAMUSCULAR; INTRAVENOUS at 15:15

## 2024-02-21 RX ADMIN — FENTANYL CITRATE 50 MCG: 50 INJECTION INTRAMUSCULAR; INTRAVENOUS at 16:59

## 2024-02-21 RX ADMIN — DEXAMETHASONE SODIUM PHOSPHATE 8 MG: 4 INJECTION, SOLUTION INTRA-ARTICULAR; INTRALESIONAL; INTRAMUSCULAR; INTRAVENOUS; SOFT TISSUE at 15:13

## 2024-02-21 RX ADMIN — FENTANYL CITRATE 50 MCG: 50 INJECTION INTRAMUSCULAR; INTRAVENOUS at 16:54

## 2024-02-21 RX ADMIN — LIDOCAINE HYDROCHLORIDE 5 ML: 10 INJECTION, SOLUTION INFILTRATION; PERINEURAL at 15:04

## 2024-02-21 RX ADMIN — MIDAZOLAM HYDROCHLORIDE 2 MG: 1 INJECTION, SOLUTION INTRAMUSCULAR; INTRAVENOUS at 14:59

## 2024-02-21 RX ADMIN — FENTANYL CITRATE 25 MCG: 0.05 INJECTION, SOLUTION INTRAMUSCULAR; INTRAVENOUS at 15:45

## 2024-02-21 RX ADMIN — FENTANYL CITRATE 50 MCG: 0.05 INJECTION, SOLUTION INTRAMUSCULAR; INTRAVENOUS at 15:04

## 2024-02-21 RX ADMIN — FENTANYL CITRATE 25 MCG: 0.05 INJECTION, SOLUTION INTRAMUSCULAR; INTRAVENOUS at 15:48

## 2024-02-21 RX ADMIN — FENTANYL CITRATE 25 MCG: 0.05 INJECTION, SOLUTION INTRAMUSCULAR; INTRAVENOUS at 15:23

## 2024-02-21 RX ADMIN — FENTANYL CITRATE 25 MCG: 50 INJECTION INTRAMUSCULAR; INTRAVENOUS at 16:41

## 2024-02-21 RX ADMIN — PROPOFOL 200 MG: 10 INJECTION, EMULSION INTRAVENOUS at 15:04

## 2024-02-21 RX ADMIN — FENTANYL CITRATE 50 MCG: 50 INJECTION INTRAMUSCULAR; INTRAVENOUS at 16:14

## 2024-02-21 RX ADMIN — CEFAZOLIN SODIUM 2 G: 2 INJECTION, SOLUTION INTRAVENOUS at 15:10

## 2024-02-21 RX ADMIN — FENTANYL CITRATE 25 MCG: 0.05 INJECTION, SOLUTION INTRAMUSCULAR; INTRAVENOUS at 15:47

## 2024-02-21 RX ADMIN — FENTANYL CITRATE 25 MCG: 50 INJECTION INTRAMUSCULAR; INTRAVENOUS at 16:26

## 2024-02-21 RX ADMIN — SODIUM CHLORIDE, POTASSIUM CHLORIDE, SODIUM LACTATE AND CALCIUM CHLORIDE: 600; 310; 30; 20 INJECTION, SOLUTION INTRAVENOUS at 14:59

## 2024-02-21 RX ADMIN — ONDANSETRON HYDROCHLORIDE 4 MG: 2 INJECTION INTRAMUSCULAR; INTRAVENOUS at 15:13

## 2024-02-21 SDOH — HEALTH STABILITY: MENTAL HEALTH: CURRENT SMOKER: 0

## 2024-02-21 ASSESSMENT — PAIN - FUNCTIONAL ASSESSMENT
PAIN_FUNCTIONAL_ASSESSMENT: 0-10

## 2024-02-21 ASSESSMENT — COLUMBIA-SUICIDE SEVERITY RATING SCALE - C-SSRS
2. HAVE YOU ACTUALLY HAD ANY THOUGHTS OF KILLING YOURSELF?: NO
1. IN THE PAST MONTH, HAVE YOU WISHED YOU WERE DEAD OR WISHED YOU COULD GO TO SLEEP AND NOT WAKE UP?: NO
6. HAVE YOU EVER DONE ANYTHING, STARTED TO DO ANYTHING, OR PREPARED TO DO ANYTHING TO END YOUR LIFE?: NO

## 2024-02-21 ASSESSMENT — PAIN SCALES - GENERAL
PAIN_LEVEL: 0
PAINLEVEL_OUTOF10: 7
PAINLEVEL_OUTOF10: 1
PAINLEVEL_OUTOF10: 7
PAINLEVEL_OUTOF10: 5 - MODERATE PAIN
PAINLEVEL_OUTOF10: 6
PAINLEVEL_OUTOF10: 6
PAINLEVEL_OUTOF10: 7
PAINLEVEL_OUTOF10: 5 - MODERATE PAIN
PAINLEVEL_OUTOF10: 7

## 2024-02-21 ASSESSMENT — PAIN DESCRIPTION - DESCRIPTORS
DESCRIPTORS: TENDER
DESCRIPTORS: BURNING

## 2024-02-21 NOTE — ANESTHESIA PROCEDURE NOTES
Airway  Date/Time: 2/21/2024 3:07 PM  Urgency: elective    Airway not difficult    Staffing  Performed: [facility]   Authorized by: Emely Prince MD MPH    Performed by: TOM Sorensen  Patient location during procedure: OR    Indications and Patient Condition  Indications for airway management: anesthesia  Spontaneous ventilation: present  Sedation level: deep  Preoxygenated: yes  Patient position: sniffing  MILS not maintained throughout  Mask difficulty assessment: 0 - not attempted    Final Airway Details  Final airway type: supraglottic airway      Successful airway: classic  Size 4     Number of attempts at approach: 1  Number of other approaches attempted: 0

## 2024-02-21 NOTE — POST-PROCEDURE NOTE
Discharge instructions provided to and reviewed with patient and her mother. They have both verbalized understanding. All questions answered.  Dressing is clean, dry and intact.   
Patient pulled from PACU A&O x 3. Patient Denies N/V. Pain level  5/10 and tolerable at this time. Snack and beverage provided and tolerating well. Family at bedside. RX to go already delivered as it was free of charge.    
Yes past 3 months

## 2024-02-21 NOTE — ANESTHESIA POSTPROCEDURE EVALUATION
Patient: Kirsty Shrestha    Procedure Summary       Date: 02/21/24 Room / Location: [facility]    Anesthesia Start: 1500 Anesthesia Stop: 1600    Procedure: Re-excision lumpectomy, Removal of left saline implant (Left) Diagnosis:       Malignant neoplasm of left breast in female, estrogen receptor positive, unspecified site of breast (CMS/HCC)      (Malignant neoplasm of left breast in female, estrogen receptor positive, unspecified site of breast (CMS/HCC) [C50.912, Z17.0])    Surgeons: Renee Pugh MD Responsible Provider: Emely Prince MD MPH    Anesthesia Type: general ASA Status: 2            Anesthesia Type: general    Vitals Value Taken Time   /86 02/21/24 1646   Temp 36.0 02/21/24 1648   Pulse 79 02/21/24 1647   Resp 15 02/21/24 1647   SpO2 98 % 02/21/24 1647   Vitals shown include unvalidated device data.    Anesthesia Post Evaluation    Patient location during evaluation: PACU  Patient participation: complete - patient participated  Level of consciousness: awake and alert  Pain score: 0  Pain management: adequate  Multimodal analgesia pain management approach  Airway patency: patent  Two or more strategies used to mitigate risk of obstructive sleep apnea  Cardiovascular status: acceptable  Respiratory status: acceptable  Hydration status: acceptable  Postoperative Nausea and Vomiting: none      There were no known notable events for this encounter.

## 2024-02-21 NOTE — ANESTHESIA PREPROCEDURE EVALUATION
Patient: Kirsty Shrestha    Procedure Information       Date/Time: 24 1445    Procedure: Re-excision lumpectomy with hematoma evacuation (Left)    [facility]: [facility] / [facility]    Surgeons: Renee Pugh MD            Relevant Problems   Anesthesia (within normal limits)      Cardiovascular   (+) Breast pain   (+) Hypertension complicating pregnancy      Endocrine   (+) Obesity      GI   (+) Esophageal reflux (No current issues)      /Renal (within normal limits)      Neuro/Psych   (+) Anxiety   (+) Carpal tunnel syndrome   (+) Chronic lumbar radiculopathy   (+) Depression      Pulmonary   (+) CHRISTA (obstructive sleep apnea)      GI/Hepatic (within normal limits)      Hematology (within normal limits)      Musculoskeletal   (+) Carpal tunnel syndrome      Eyes, Ears, Nose, and Throat (within normal limits)      Infectious Disease   (+) Bacterial vaginosis   (+) Hidradenitis suppurativa of left axilla   (+) Infection due to Chlamydia species   (+) Onychomycosis of toenail   (+) Tinea pedis of both feet     Past Surgical History:   Procedure Laterality Date   • BI US GUIDED BREAST LOCALIZATION AND BIOPSY LEFT Left 2023    BI US GUIDED BREAST LOCALIZATION AND BIOPSY LEFT 2023 Briana Bauer MD [facility]   •  SECTION, LOW TRANSVERSE  2012     Section Low Transverse   • COSMETIC SURGERY     • MASTECTOMY Bilateral    • OTHER SURGICAL HISTORY  2022    Mastectomy bilateral   • OTHER SURGICAL HISTORY  2022    Hand surgery   • OTHER SURGICAL HISTORY  2021    Ovarian cystectomy   • WI BREAST AUGMENTATION WITH IMPLANT         Clinical information reviewed:   Tobacco  Allergies  Meds   Med Hx  Surg Hx  OB Status  Fam Hx  Soc   Hx        NPO Detail:  NPO/Void Status  Carbohydrate Drink Given Prior to Surgery? : N  Date of Last Liquid: 24  Time of Last Liquid: 0800  Date of Last Solid: 24  Time of Last Solid: 1700  Last Intake Type: Clear  fluids  Time of Last Void: 1350      Lab Results   Component Value Date    WBC 8.7 10/17/2023    HGB 13.9 10/17/2023    HCT 41.7 10/17/2023    MCV 88 10/17/2023     10/17/2023       Chemistry    Lab Results   Component Value Date/Time     10/17/2023 1048    K 4.2 10/17/2023 1048     10/17/2023 1048    CO2 26 10/17/2023 1048    BUN 9 10/17/2023 1048    CREATININE 0.70 10/17/2023 1048    Lab Results   Component Value Date/Time    CALCIUM 9.8 10/17/2023 1048    ALKPHOS 88 10/17/2023 1048    AST 14 10/17/2023 1048    ALT 15 10/17/2023 1048    BILITOT 0.5 10/17/2023 1048             Physical Exam    Airway  Mallampati: I  TM distance: >3 FB  Neck ROM: full     Cardiovascular - normal exam     Dental    Pulmonary - normal exam     Abdominal - normal exam         Anesthesia Plan    History of general anesthesia?: yes  History of complications of general anesthesia?: no    ASA 2     general     The patient is not a current smoker.  Patient was not previously instructed to abstain from smoking on day of procedure.  Patient did not smoke on day of procedure.  Education provided regarding risk of obstructive sleep apnea.  intravenous induction   Postoperative administration of opioids is intended.  Trial extubation is planned.  Anesthetic plan and risks discussed with patient.  Use of blood products discussed with patient who consented to blood products.    Plan discussed with CRNA and CAA.

## 2024-02-21 NOTE — OP NOTE
Re-excision lumpectomy, Removal of left saline implant (L) Operative Note     Date: 2024  OR Location: [facility]    Name: Kirsty Shrestha : 1990, Age: 33 y.o., MRN: [id number], Sex: female    Diagnosis  Pre-op Diagnosis     * Malignant neoplasm of left breast in female, estrogen receptor positive, unspecified site of breast (CMS/HCC) [C50.912, Z17.0] Post-op Diagnosis     * Malignant neoplasm of left breast in female, estrogen receptor positive, unspecified site of breast (CMS/HCC) [C50.912, Z17.0]     Procedures  Re-excision lumpectomy, Removal of left saline implant  63058 - [place] MASTECTOMY PARTIAL      Surgeons      * Renee Pugh - Primary    Resident/Fellow/Other Assistant:  DORCAS Ruggiero PA-C    Procedure Summary  Anesthesia: General  ASA: II  Anesthesia Staff: Anesthesiologist: Emely Prince MD MPH  C-AA: TOM Sorensen  Estimated Blood Loss: 5mL  Intra-op Medications:   Administrations occurring from 1445 to 1615 on 24:   Medication Name Total Dose   lidocaine-epinephrine (Xylocaine W/EPI) 1 %-1:100,000 injection 5 mL   BUPivacaine HCl (Marcaine) 0.5 % (5 mg/mL) injection 5 mL   lactated Ringer's infusion Cannot be calculated   ceFAZolin in dextrose (iso-os) (Ancef) IVPB 2 g 2 g              Anesthesia Record               Intraprocedure I/O Totals          Intake    lactated Ringer's infusion 800.00 mL    Total Intake 800 mL          Specimen:   ID Type Source Tests Collected by Time   1 : re-excision lateral left breast , short=superior, long=lateral Tissue BREAST LUMPECTOMY LEFT SURGICAL PATHOLOGY EXAM Renee Pugh MD 2024 1523   2 : left breast implant Tissue BREAST IMPLANT LEFT SURGICAL PATHOLOGY EXAM Renee Pugh MD 2024 1536        Staff:   Circulator: Loida Vásquez RN  Relief Circulator: Aldo Mccracken RN  Scrub Person: Karolina Kendall RN         Drains and/or Catheters: none  Tourniquet Times: n/a        Implants: none    Findings: puncture in left  implant    Indications: Kirsty Shrestha is an 33 y.o. female who is having surgery for Malignant neoplasm of left breast in female, estrogen receptor positive, unspecified site of breast (CMS/[facility]) [C50.912, Z17.0].     The patient was seen in the preoperative area. The risks, benefits, complications, treatment options, non-operative alternatives, expected recovery and outcomes were discussed with the patient. The possibilities of reaction to medication, pulmonary aspiration, injury to surrounding structures, bleeding, recurrent infection, the need for additional procedures, failure to diagnose a condition, and creating a complication requiring transfusion or operation were discussed with the patient. The patient concurred with the proposed plan, giving informed consent.  The site of surgery was properly noted/marked if necessary per policy. The patient has been actively warmed in preoperative area. Preoperative antibiotics have been ordered and given within 1 hours of incision. Venous thrombosis prophylaxis have been ordered including bilateral sequential compression devices    Procedure Details:   Operative indications: The patient had a recurrent left breast cancer. She had a lumpectomy which showed  a positive lateral margin.  Again her surgical options were reviewed and the patient chose to proceed with a reexcision lumpectomy.  The risks, benefits and procedures were reviewed including the risks of bleeding, infection, scar tissue formation, deformity of the area and anesthesia.  She understood all risks and agreed to proceed.  Yesterday, the patient called and had swelling, bruising and pain in the left chest. We planned to return to the OR for evacuation of a hematoma and re-excision of the lateral margin. The patient asked about having the implant removed but I told her I did not think we needed to do that at this time.    Operative report: The patient was taken into the operating room and placed on the  table in the supine position.  A timeout was performed.  The site had been marked preoperatively.  She received general anesthesia and IV antibiotics without complication and was prepped and draped in the usual sterile fashion.  Local anesthesia was obtained at the previous lumpectomy incision in the superior left breast.  An incision was made excising the previous lumpectomy incision with a 15 blade scalpel and taken down through the subcutaneous tissues.  The lumpectomy cavity was identified and a small hematoma was seen and evacuated. The implant capsule was  medially and clear fluid was seen coming out from around the implant. On further inspection, a very tiny puncture was seen in the saline implant. The re-excision of the lateral margin was performed and the entire lateral aspect was excised using Metzenbaum scissors. The specimen was labeled with a short stitch superiorly and a long stitch laterally. It was identified as re-excision lumpectomy left breast tissue lateral margin. The specimen was sent to pathology.  Hemostasis was achieved with Bovie electrocautery.  After adequate hemostasis, the wound was irrigated and the irrigation fluid was suctioned out. The implant capsule was opened further and the implant was removed and sent to pathology. The capsule and area was irrigated with arisept.The implant capsule was closed with 3-0 vicryl stitches. A superficial subcutaneous layer was closed with interrupted 3-0 Vicryl stitches.  The skin was closed with a running subcuticular 4-0 Monocryl stitch.  Steri-Strips were placed followed by fluffs and a Surgi-Bra.  She tolerated the procedure well and was transferred to PACU in stable condition.   Complications:  None; patient tolerated the procedure well.    Disposition: PACU - hemodynamically stable.  Condition: stable     This procedure was not performed to treat breast cancer through sentinel node biopsy      Additional Details: OR findings d/w  patient and family post-operatively    Attending Attestation: I performed the procedure.    Renee Pugh  Phone Number: [phone]

## 2024-02-22 ENCOUNTER — OFFICE VISIT (OUTPATIENT)
Dept: PLASTIC SURGERY | Facility: CLINIC | Age: 34
End: 2024-02-22
Payer: COMMERCIAL

## 2024-02-22 DIAGNOSIS — Z90.12 ACQUIRED ABSENCE OF LEFT BREAST: Primary | ICD-10-CM

## 2024-02-22 PROCEDURE — 3008F BODY MASS INDEX DOCD: CPT | Performed by: SURGERY

## 2024-02-22 PROCEDURE — 99213 OFFICE O/P EST LOW 20 MIN: CPT | Performed by: SURGERY

## 2024-02-22 PROCEDURE — 1036F TOBACCO NON-USER: CPT | Performed by: SURGERY

## 2024-02-22 ASSESSMENT — PAIN SCALES - GENERAL: PAINLEVEL_OUTOF10: 3

## 2024-02-22 NOTE — PROGRESS NOTES
Plastic and Reconstructive Surgery              VIRTUAL Visit                      Date: 02/22/24  Date of Surgery: 5/23/25    Subjective   Kirsty Shrestha is a 33 y.o. female who presents urgent add-on virtual visit    They are s/p bilateral stage 2 breast recosntruction with removal of TE and placement of new IDEAL saline implants 610cc with autologous fat grafting of 160cc per breast and abdominoplasty with rectus plication and umbilical transposition on 5/23/23 with Dr. Barker.       Previously, we noted a left superior breast mass that was firm, this was thought to be from fat grafting or concerns for fat necrosis. We sent her for US imaging of this mass which resulted as BI-RADS cat 4 for the left breast 12:00 -- 4.1cm complex cystic and solid mass. She underwent biopsy of 12:00 mass which showed recurrence of invasive ductal carcinoma. She met with Dr. Pugh on 1/12/24.   She was taken to the operating room by Dr. Pugh yesterday on 2-21-24, for reexcision of the lumpectomy site, during the surgery the implant was noted to be punctured and was removed.      Objective   Exam deferred audio only visit        Assessment/Plan     Kirsty Shrestha is a 33 y.o. female who presents for follow up visit. They are s/p bilateral stage 2 breast recosntruction with removal of TE and placement of new IDEAL saline implants 610cc with autologous fat grafting of 160cc per breast and abdominoplasty with rectus plication and umbilical transposition on 5/23/23 with Dr. Barker.      She is s/p for reexcision of the lumpectomy site, during the surgery the implant was noted to be punctured and was removed.        Plan:   We had a long discussion today.  She understands that flap has been removed, and she is indicated for evaluation.  We discussed that radiation would permanently alter the pliability of her skin.    We also discussed that she is no longer a candidate for deep inferior epigastric   surgery because she is now status post abdominoplasty.    We indicated that her options moving forward after radiation for reconstruction would be latissimus dorsi myocutaneous flap with an implant possibly after tissue expansion, or a possible free chrysalis based microsurgical breast reconstruction.    She was disinterested in both of these, and would like to move forward with attempting to replace the implant or possibly place a tissue expander soon as possible.    I will work on this on our end, I discussed with her that this may be complicated secondary to my anticipated paternity leave but I will do our best in the next several days to schedule her within a week or so if possible.    I spent 20 minutes with this patient. Greater than 50% of this time was spent in the counselling and/or coordination of care of this patient.  This note was created using voice recognition software and was not corrected for typographical or grammatical errors.    Signature: Galileo Barker MD  Date: 02/22/24

## 2024-02-26 ENCOUNTER — ANESTHESIA (OUTPATIENT)
Dept: OPERATING ROOM | Facility: CLINIC | Age: 34
End: 2024-02-26
Payer: COMMERCIAL

## 2024-02-26 ENCOUNTER — ANESTHESIA EVENT (OUTPATIENT)
Dept: OPERATING ROOM | Facility: CLINIC | Age: 34
End: 2024-02-26
Payer: COMMERCIAL

## 2024-02-26 ENCOUNTER — APPOINTMENT (OUTPATIENT)
Dept: PRIMARY CARE | Facility: CLINIC | Age: 34
End: 2024-02-26
Payer: COMMERCIAL

## 2024-02-26 ENCOUNTER — HOSPITAL ENCOUNTER (OUTPATIENT)
Facility: CLINIC | Age: 34
Setting detail: OUTPATIENT SURGERY
Discharge: HOME | End: 2024-02-26
Attending: SURGERY | Admitting: SURGERY
Payer: COMMERCIAL

## 2024-02-26 VITALS
HEIGHT: 63 IN | TEMPERATURE: 96.8 F | SYSTOLIC BLOOD PRESSURE: 156 MMHG | OXYGEN SATURATION: 96 % | RESPIRATION RATE: 16 BRPM | BODY MASS INDEX: 37.03 KG/M2 | DIASTOLIC BLOOD PRESSURE: 87 MMHG | WEIGHT: 209 LBS | HEART RATE: 60 BPM

## 2024-02-26 DIAGNOSIS — Z90.12 ACQUIRED ABSENCE OF LEFT BREAST: Primary | ICD-10-CM

## 2024-02-26 DIAGNOSIS — G89.18 POST-OP PAIN: ICD-10-CM

## 2024-02-26 LAB — PREGNANCY TEST URINE, POC: NEGATIVE

## 2024-02-26 PROCEDURE — 2500000005 HC RX 250 GENERAL PHARMACY W/O HCPCS: Mod: SE | Performed by: ANESTHESIOLOGIST ASSISTANT

## 2024-02-26 PROCEDURE — 88304 TISSUE EXAM BY PATHOLOGIST: CPT | Mod: TC,SUR,ELYLAB | Performed by: SURGERY

## 2024-02-26 PROCEDURE — 7100000002 HC RECOVERY ROOM TIME - EACH INCREMENTAL 1 MINUTE: Performed by: SURGERY

## 2024-02-26 PROCEDURE — 7100000001 HC RECOVERY ROOM TIME - INITIAL BASE CHARGE: Performed by: SURGERY

## 2024-02-26 PROCEDURE — 7100000010 HC PHASE TWO TIME - EACH INCREMENTAL 1 MINUTE: Performed by: SURGERY

## 2024-02-26 PROCEDURE — 3600000008 HC OR TIME - EACH INCREMENTAL 1 MINUTE - PROCEDURE LEVEL THREE: Performed by: SURGERY

## 2024-02-26 PROCEDURE — 2500000001 HC RX 250 WO HCPCS SELF ADMINISTERED DRUGS (ALT 637 FOR MEDICARE OP): Mod: SE | Performed by: PAIN MEDICINE

## 2024-02-26 PROCEDURE — 88304 TISSUE EXAM BY PATHOLOGIST: CPT | Performed by: PATHOLOGY

## 2024-02-26 PROCEDURE — 2500000001 HC RX 250 WO HCPCS SELF ADMINISTERED DRUGS (ALT 637 FOR MEDICARE OP): Mod: SE | Performed by: ANESTHESIOLOGIST ASSISTANT

## 2024-02-26 PROCEDURE — 2500000004 HC RX 250 GENERAL PHARMACY W/ HCPCS (ALT 636 FOR OP/ED): Mod: SE | Performed by: SURGERY

## 2024-02-26 PROCEDURE — 2720000007 HC OR 272 NO HCPCS: Performed by: SURGERY

## 2024-02-26 PROCEDURE — 3700000002 HC GENERAL ANESTHESIA TIME - EACH INCREMENTAL 1 MINUTE: Performed by: SURGERY

## 2024-02-26 PROCEDURE — 3700000001 HC GENERAL ANESTHESIA TIME - INITIAL BASE CHARGE: Performed by: SURGERY

## 2024-02-26 PROCEDURE — 7100000009 HC PHASE TWO TIME - INITIAL BASE CHARGE: Performed by: SURGERY

## 2024-02-26 PROCEDURE — A19342 PR DELAY BREAST PROS AFTER BREAST SURG: Performed by: PAIN MEDICINE

## 2024-02-26 PROCEDURE — 2500000005 HC RX 250 GENERAL PHARMACY W/O HCPCS: Mod: SE | Performed by: SURGERY

## 2024-02-26 PROCEDURE — A19342 PR DELAY BREAST PROS AFTER BREAST SURG: Performed by: ANESTHESIOLOGIST ASSISTANT

## 2024-02-26 PROCEDURE — A4217 STERILE WATER/SALINE, 500 ML: HCPCS | Mod: SE | Performed by: SURGERY

## 2024-02-26 PROCEDURE — 2500000004 HC RX 250 GENERAL PHARMACY W/ HCPCS (ALT 636 FOR OP/ED): Mod: SE | Performed by: ANESTHESIOLOGIST ASSISTANT

## 2024-02-26 PROCEDURE — 3600000003 HC OR TIME - INITIAL BASE CHARGE - PROCEDURE LEVEL THREE: Performed by: SURGERY

## 2024-02-26 PROCEDURE — 2780000003 HC OR 278 NO HCPCS: Performed by: SURGERY

## 2024-02-26 PROCEDURE — 19342 INSJ/RPLCMT BRST IMPLT SEP D: CPT | Performed by: SURGERY

## 2024-02-26 DEVICE — ROUND, SMOOTH-SURFACE, SALINE-FILLED BREAST IMPLANT.
Type: IMPLANTABLE DEVICE | Site: BREAST | Status: FUNCTIONAL
Brand: IDEAL IMPLANT

## 2024-02-26 RX ORDER — GABAPENTIN 300 MG/1
CAPSULE ORAL AS NEEDED
Status: DISCONTINUED | OUTPATIENT
Start: 2024-02-26 | End: 2024-02-26

## 2024-02-26 RX ORDER — ONDANSETRON HYDROCHLORIDE 2 MG/ML
INJECTION, SOLUTION INTRAVENOUS AS NEEDED
Status: DISCONTINUED | OUTPATIENT
Start: 2024-02-26 | End: 2024-02-26

## 2024-02-26 RX ORDER — DOCUSATE SODIUM 100 MG/1
100 CAPSULE, LIQUID FILLED ORAL 2 TIMES DAILY
Qty: 14 CAPSULE | Refills: 0 | Status: SHIPPED | OUTPATIENT
Start: 2024-02-26 | End: 2024-03-04

## 2024-02-26 RX ORDER — SODIUM CHLORIDE, SODIUM LACTATE, POTASSIUM CHLORIDE, CALCIUM CHLORIDE 600; 310; 30; 20 MG/100ML; MG/100ML; MG/100ML; MG/100ML
INJECTION, SOLUTION INTRAVENOUS CONTINUOUS PRN
Status: DISCONTINUED | OUTPATIENT
Start: 2024-02-26 | End: 2024-02-26

## 2024-02-26 RX ORDER — CEFAZOLIN 1 G/1
INJECTION, POWDER, FOR SOLUTION INTRAVENOUS AS NEEDED
Status: DISCONTINUED | OUTPATIENT
Start: 2024-02-26 | End: 2024-02-26

## 2024-02-26 RX ORDER — OXYCODONE HYDROCHLORIDE 5 MG/1
10 TABLET ORAL EVERY 4 HOURS PRN
Status: DISCONTINUED | OUTPATIENT
Start: 2024-02-26 | End: 2024-02-26 | Stop reason: HOSPADM

## 2024-02-26 RX ORDER — HYDROMORPHONE HYDROCHLORIDE 1 MG/ML
INJECTION, SOLUTION INTRAMUSCULAR; INTRAVENOUS; SUBCUTANEOUS AS NEEDED
Status: DISCONTINUED | OUTPATIENT
Start: 2024-02-26 | End: 2024-02-26

## 2024-02-26 RX ORDER — FENTANYL CITRATE 50 UG/ML
INJECTION, SOLUTION INTRAMUSCULAR; INTRAVENOUS AS NEEDED
Status: DISCONTINUED | OUTPATIENT
Start: 2024-02-26 | End: 2024-02-26

## 2024-02-26 RX ORDER — OXYCODONE HYDROCHLORIDE 5 MG/1
5 TABLET ORAL EVERY 4 HOURS PRN
Status: DISCONTINUED | OUTPATIENT
Start: 2024-02-26 | End: 2024-02-26 | Stop reason: HOSPADM

## 2024-02-26 RX ORDER — CEPHALEXIN 500 MG/1
500 CAPSULE ORAL 4 TIMES DAILY
Qty: 40 CAPSULE | Refills: 0 | Status: SHIPPED | OUTPATIENT
Start: 2024-02-26 | End: 2024-03-08 | Stop reason: ALTCHOICE

## 2024-02-26 RX ORDER — KETOROLAC TROMETHAMINE 30 MG/ML
INJECTION, SOLUTION INTRAMUSCULAR; INTRAVENOUS AS NEEDED
Status: DISCONTINUED | OUTPATIENT
Start: 2024-02-26 | End: 2024-02-26

## 2024-02-26 RX ORDER — LIDOCAINE HYDROCHLORIDE 20 MG/ML
INJECTION, SOLUTION INFILTRATION; PERINEURAL AS NEEDED
Status: DISCONTINUED | OUTPATIENT
Start: 2024-02-26 | End: 2024-02-26

## 2024-02-26 RX ORDER — ONDANSETRON HYDROCHLORIDE 2 MG/ML
4 INJECTION, SOLUTION INTRAVENOUS ONCE AS NEEDED
Status: DISCONTINUED | OUTPATIENT
Start: 2024-02-26 | End: 2024-02-26 | Stop reason: HOSPADM

## 2024-02-26 RX ORDER — HYDROCODONE BITARTRATE AND ACETAMINOPHEN 5; 325 MG/1; MG/1
1 TABLET ORAL EVERY 6 HOURS
Qty: 12 TABLET | Refills: 0 | Status: SHIPPED | OUTPATIENT
Start: 2024-02-26 | End: 2024-02-29

## 2024-02-26 RX ORDER — HYDROMORPHONE HYDROCHLORIDE 1 MG/ML
0.5 INJECTION, SOLUTION INTRAMUSCULAR; INTRAVENOUS; SUBCUTANEOUS EVERY 5 MIN PRN
Status: DISCONTINUED | OUTPATIENT
Start: 2024-02-26 | End: 2024-02-26 | Stop reason: HOSPADM

## 2024-02-26 RX ORDER — ACETAMINOPHEN 325 MG/1
TABLET ORAL AS NEEDED
Status: DISCONTINUED | OUTPATIENT
Start: 2024-02-26 | End: 2024-02-26

## 2024-02-26 RX ORDER — ACETAMINOPHEN 325 MG/1
650 TABLET ORAL EVERY 8 HOURS PRN
Status: DISCONTINUED | OUTPATIENT
Start: 2024-02-26 | End: 2024-02-26 | Stop reason: HOSPADM

## 2024-02-26 RX ORDER — MIDAZOLAM HYDROCHLORIDE 1 MG/ML
INJECTION, SOLUTION INTRAMUSCULAR; INTRAVENOUS AS NEEDED
Status: DISCONTINUED | OUTPATIENT
Start: 2024-02-26 | End: 2024-02-26

## 2024-02-26 RX ORDER — DEXAMETHASONE SODIUM PHOSPHATE 4 MG/ML
INJECTION, SOLUTION INTRA-ARTICULAR; INTRALESIONAL; INTRAMUSCULAR; INTRAVENOUS; SOFT TISSUE AS NEEDED
Status: DISCONTINUED | OUTPATIENT
Start: 2024-02-26 | End: 2024-02-26

## 2024-02-26 RX ORDER — PROPOFOL 10 MG/ML
INJECTION, EMULSION INTRAVENOUS CONTINUOUS PRN
Status: DISCONTINUED | OUTPATIENT
Start: 2024-02-26 | End: 2024-02-26

## 2024-02-26 RX ORDER — LABETALOL HYDROCHLORIDE 5 MG/ML
5 INJECTION, SOLUTION INTRAVENOUS EVERY 10 MIN PRN
Status: DISCONTINUED | OUTPATIENT
Start: 2024-02-26 | End: 2024-02-26 | Stop reason: HOSPADM

## 2024-02-26 RX ORDER — PROPOFOL 10 MG/ML
INJECTION, EMULSION INTRAVENOUS AS NEEDED
Status: DISCONTINUED | OUTPATIENT
Start: 2024-02-26 | End: 2024-02-26

## 2024-02-26 RX ORDER — SODIUM CHLORIDE, SODIUM LACTATE, POTASSIUM CHLORIDE, CALCIUM CHLORIDE 600; 310; 30; 20 MG/100ML; MG/100ML; MG/100ML; MG/100ML
100 INJECTION, SOLUTION INTRAVENOUS CONTINUOUS
Status: DISCONTINUED | OUTPATIENT
Start: 2024-02-26 | End: 2024-02-26 | Stop reason: HOSPADM

## 2024-02-26 RX ORDER — SODIUM CHLORIDE 0.9 G/100ML
IRRIGANT IRRIGATION AS NEEDED
Status: DISCONTINUED | OUTPATIENT
Start: 2024-02-26 | End: 2024-02-26 | Stop reason: HOSPADM

## 2024-02-26 RX ADMIN — LIDOCAINE HYDROCHLORIDE 80 MG: 20 INJECTION, SOLUTION INFILTRATION; PERINEURAL at 11:27

## 2024-02-26 RX ADMIN — HYDROMORPHONE HYDROCHLORIDE 1 MG: 1 INJECTION, SOLUTION INTRAMUSCULAR; INTRAVENOUS; SUBCUTANEOUS at 12:03

## 2024-02-26 RX ADMIN — OXYCODONE 5 MG: 5 TABLET ORAL at 12:56

## 2024-02-26 RX ADMIN — MIDAZOLAM 2 MG: 1 INJECTION INTRAMUSCULAR; INTRAVENOUS at 11:20

## 2024-02-26 RX ADMIN — PROPOFOL 75 MCG/KG/MIN: 10 INJECTION, EMULSION INTRAVENOUS at 11:38

## 2024-02-26 RX ADMIN — GABAPENTIN 300 MG: 300 CAPSULE ORAL at 10:55

## 2024-02-26 RX ADMIN — DEXAMETHASONE SODIUM PHOSPHATE 4 MG: 4 INJECTION, SOLUTION INTRAMUSCULAR; INTRAVENOUS at 11:41

## 2024-02-26 RX ADMIN — CEFAZOLIN 2 G: 1 INJECTION, POWDER, FOR SOLUTION INTRAMUSCULAR; INTRAVENOUS at 11:33

## 2024-02-26 RX ADMIN — KETOROLAC TROMETHAMINE 30 MG: 30 INJECTION, SOLUTION INTRAMUSCULAR at 12:20

## 2024-02-26 RX ADMIN — ONDANSETRON 4 MG: 2 INJECTION INTRAMUSCULAR; INTRAVENOUS at 12:19

## 2024-02-26 RX ADMIN — PROPOFOL 200 MG: 10 INJECTION, EMULSION INTRAVENOUS at 11:27

## 2024-02-26 RX ADMIN — ACETAMINOPHEN 975 MG: 325 TABLET ORAL at 10:55

## 2024-02-26 RX ADMIN — FENTANYL CITRATE 50 MCG: 50 INJECTION, SOLUTION INTRAMUSCULAR; INTRAVENOUS at 11:44

## 2024-02-26 RX ADMIN — SODIUM CHLORIDE, POTASSIUM CHLORIDE, SODIUM LACTATE AND CALCIUM CHLORIDE: 600; 310; 30; 20 INJECTION, SOLUTION INTRAVENOUS at 10:49

## 2024-02-26 RX ADMIN — FENTANYL CITRATE 50 MCG: 50 INJECTION, SOLUTION INTRAMUSCULAR; INTRAVENOUS at 11:27

## 2024-02-26 ASSESSMENT — PAIN - FUNCTIONAL ASSESSMENT
PAIN_FUNCTIONAL_ASSESSMENT: 0-10

## 2024-02-26 ASSESSMENT — PAIN SCALES - GENERAL
PAINLEVEL_OUTOF10: 0 - NO PAIN
PAINLEVEL_OUTOF10: 8
PAINLEVEL_OUTOF10: 6
PAINLEVEL_OUTOF10: 0 - NO PAIN
PAINLEVEL_OUTOF10: 6
PAINLEVEL_OUTOF10: 8

## 2024-02-26 ASSESSMENT — ENCOUNTER SYMPTOMS
DYSURIA: 0
FLANK PAIN: 0
VOMITING: 0
WHEEZING: 0
FATIGUE: 0
WEAKNESS: 0
SORE THROAT: 0
APPETITE CHANGE: 0
NUMBNESS: 0
COUGH: 0
JOINT SWELLING: 0
BACK PAIN: 0
EYE PAIN: 0
FEVER: 0
NAUSEA: 0
EYE ITCHING: 0
COLOR CHANGE: 0
NECK PAIN: 0
CHILLS: 0
WOUND: 0
DIZZINESS: 0
CHEST TIGHTNESS: 0
BLOOD IN STOOL: 0
SHORTNESS OF BREATH: 0
DIARRHEA: 0
LIGHT-HEADEDNESS: 0
ABDOMINAL PAIN: 0
HEADACHES: 0
EYE REDNESS: 0

## 2024-02-26 ASSESSMENT — COLUMBIA-SUICIDE SEVERITY RATING SCALE - C-SSRS
2. HAVE YOU ACTUALLY HAD ANY THOUGHTS OF KILLING YOURSELF?: NO
6. HAVE YOU EVER DONE ANYTHING, STARTED TO DO ANYTHING, OR PREPARED TO DO ANYTHING TO END YOUR LIFE?: NO
1. IN THE PAST MONTH, HAVE YOU WISHED YOU WERE DEAD OR WISHED YOU COULD GO TO SLEEP AND NOT WAKE UP?: NO

## 2024-02-26 NOTE — OP NOTE
PLASTIC SURGERY  Operative Note     Date: 2024  OR Location: [facility] OR    Name: Kirsty Shrestha, : 1990, Age: 33 y.o., MRN: [id number], Sex: female    Diagnosis  Pre-op Diagnosis     * Acquired absence of left breast [Z90.12] Post-op Diagnosis     * Acquired absence of left breast [Z90.12]     Procedures  Left Breast Delayed reconstruction, 610 ml structured saline implant  Left breast capsulotomy    Surgeons      * Galileo Barker - Primary    Resident/Fellow/Other Assistant:  Surgeon(s) and Role:     * Mann Palm DPM - Assisting    Procedure Summary  Anesthesia: General  ASA: ASA status not filed in the log.  Anesthesia Staff: Anesthesiologist: Cristian Duffy MD  C-AA: TOM Wallace  Estimated Blood Loss: 25mL  Intra-op Medications:   Administrations occurring from 1130 to 1315 on 24:   Medication Name Total Dose   sodium chloride 0.9 % irrigation solution 500 mL   lidocaine-epinephrine PF (Xylocaine W/EPI) 1 %-1:200,000 injection 5 mL              Anesthesia Record               Intraprocedure I/O Totals          Intake    Propofol Drip 0.00 mL    The total shown is the total volume documented since Anesthesia Start was filed.    Total Intake 0 mL          Specimen:   ID Type Source Tests Collected by Time   1 : Left Breast Capsule Tissue BREAST CAPSULE LEFT SURGICAL PATHOLOGY EXAM Galileo Barker MD 2024 1158        Staff:   Circulator: Dmitry Her RN; Lynsey Abebe RN  Relief Scrub: Clarice Oakes RN  Scrub Person: Terri Carrasco         Drains and/or Catheters:15 Fr exiting left breast        Implants:  Implants       Type Name Action Serial No.       595CC IDEAL IMPLANT STRUCTED BREAST IMPLANT, ROUND, SMOOTH-SURFACE, SALINE-FILLED Implanted 2007     Mesh TIGR Matrix Surgical Mesh 20 x 30 cm Implanted                 IDEAL Implant Structured Breast Implant  S/N: 2007 (595cc)  Empty implant volume = 94 cc  Back-Inner volume = 346  cc  Front-Outer volume = 170 cc  Total Implant Volume = 610 cc    Findings: Seroma left breast, extensive capsule contracture, requiring extensive capsulotomy/capsulorrhaphy in order to accommodate the same size implant as before.  Peau d'orange character of the left inferior pole skin    Indications: Kirsty Shrestha is an 33 y.o. female who is having surgery for Acquired absence of left breast [Z90.12]. s/p bilateral stage 2 breast recosntruction with removal of TE and placement of new IDEAL saline implants 610cc with autologous fat grafting of 160cc per breast and abdominoplasty with rectus plication and umbilical transposition on 5/23/23        Previously, we noted a left superior breast mass that was firm, this was thought to be from fat grafting or concerns for fat necrosis. We sent her for US imaging of this mass which resulted as BI-RADS cat 4 for the left breast 12:00 -- 4.1cm complex cystic and solid mass. She underwent biopsy of 12:00 mass which showed recurrence of invasive ductal carcinoma. She met with Dr. Pugh on 1/12/24.   She was taken to the operating room by Dr. Pugh  on 2-21-24, for reexcision of the lumpectomy site, during the surgery the implant was noted to be punctured and was removed    Given the fact that we have already performed abdominoplasty, and given the fact that she is going on to radiation, we performed virtual visit on 2/22/2021 plan for urgent return the OR for placement of implants in order to salvage some reconstruction ahead of planned radiation.  Her options for reconstruction post radiation are diminished secondary to abdominoplasty, and she is disinterested in latissimus based reconstruction.    We discussed the plan for today to be capsulotomy, capsulorrhaphy, placement of mesh for an barrier pole support, and placement of the identical sized implant.      INFORMED CONSENT  Patient was told the risks, benefits, INDICATIONS, contraindications, and alternatives to the  procedure. Risks include but not limited to pain, infection, bleeding, hematoma, seroma, injury to neurovascular and tendinous structures, asymmetry, cosmetic dissatisfaction, and need for subsequent surgeries.      The patient demonstrated understanding of these risks and agreed to proceed with surgery.  Advance directives discussed.  Team approach explained.       The patient consented and wished to proceed with the procedure and for medical photography if needed.     PROCEDURAL PAUSE  Prior to the beginning of the procedure, the patient's correct identity, side, site, and procedure to be performed were verified.  The patient was given intravenous antibiotics prior to skin incision.     PROCEDURAL NOTE  Kirsty was brought to the operative theater at which point She was transferred to the operating room table.  All bony prominences were well padded, and sequential compression devices were placed to each lower extremity. Patient was then secured with safety straps.  The patient was then placed under IV sedation, and our anesthesia colleagues administered general endotracheal anesthesia.    We began on the left breast, we remove the Steri-Strips from the transverse superior pole incision performed by Dr. Pugh.  We prepped and draped in standard sterile fashion after injecting 10 cc of 1% lidocaine with epinephrine into the inframammary fold incision.    We then made incision with a 15 blade scalpel, dissected out with unipolar cautery until we entered the capsule, there was significant amount of hemoserous fluid which was drained.  We noted significant contracture of the capsule, we performed extensive and prolonged capsulotomy capsulorrhaphy in order to widen the capsule to accommodate an implant that would be identical to the previously placed implant.  Capsule was sent for pathology.    We then placed a 15 [place] MARII drain.  We then placed a customized piece of TIGR matrix surgical mesh tacked to the pectoralis  major muscle and rib periosteum and draped inferiorly in order to provide a hammock type inferior pole and anterior portion of the inferior pole support.    We prepped and draped the skin with Betadine, we placed Ioban over the skin.  We placed a empty structured saline implant, and filled the implant to 610 cc, (it was a 595 cc structured saline IDEAL implant).    After this was completed we irrigated the pocket with Irrisept solution, we closed the capsule with 2-0 Vicryl, we followed this with a running 3-0 strata fix in the deep dermis and followed this with another running 3-0 strata fix in the subcuticular layer, we placed interrupted 4-0 plain gut horizontal mattress sutures at the areas of max tension.  We placed Exofin glue over the IMF incision as well as the transverse incisions, we placed ABD over the breast and fluffs in the axilla.     The patient tolerated the procedure well and was awakened from anesthesia without any difficulties, she was transferred to the PACU in stable condition, all needle counts were correct.     POST OP PLAN:  Kirsty will have a follow-up appointment next week, she will remain outpatient.  We prescribed pain medication as well as antibiotics.  She is instructed to shower daily and empty the drain twice daily and record the output.      Galileo Barker  Phone Number: [phone]

## 2024-02-26 NOTE — DISCHARGE INSTRUCTIONS
May have Tylenol after: 6:20PM    May have Ibuprofen/advil/motrin/aleve after: 5:00PM    Oxycodone given at 1:00 pm, next dose or Norco at 7:00 pm    Galileo Barker MD  Plastic Surgery-Hand Surgery  Primary Office:  [facility]  [address], Department [place] [address]  Email: [email or web address]  Pager: [phone]  Scheduling Phone: [phone]    TO REACH YOUR PHYSICIAN AFTER HOURS CALL  AND ASK FOR THE PHYSICIAN ON CALL

## 2024-02-26 NOTE — ANESTHESIA POSTPROCEDURE EVALUATION
Patient: Kirsty Shrestha    Procedure Summary       Date: 02/26/24 Room / Location: [facility] / [facility]    Anesthesia Start: 1120 Anesthesia Stop: 1238    Procedure: Left Breast Reconstruction Revision w/Implant (Left: Breast) Diagnosis:       Acquired absence of left breast      (Acquired absence of left breast [Z90.12])    Surgeons: Galileo Barker MD Responsible Provider: Cristian Duffy MD    Anesthesia Type: general ASA Status: 2            Anesthesia Type: general    Vitals Value Taken Time   /93 02/26/24 1248   Temp 36 °C (96.8 °F) 02/26/24 1237   Pulse 76 02/26/24 1248   Resp 16 02/26/24 1248   SpO2 100 % 02/26/24 1248       Anesthesia Post Evaluation    Patient location during evaluation: PACU  Patient participation: complete - patient participated  Level of consciousness: awake  Pain management: inadequate  Airway patency: patent  Cardiovascular status: acceptable  Respiratory status: acceptable  Hydration status: acceptable  Postoperative Nausea and Vomiting: none      Patient awake and voices she is still in pain.1305 2/26/2024 JENNA Duffy  There were no known notable events for this encounter.
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
## 2024-02-26 NOTE — ANESTHESIA PROCEDURE NOTES
Airway  Date/Time: 2/26/2024 11:29 AM  Urgency: elective    Airway not difficult    Staffing  Performed: [facility]   Authorized by: TOM Wallace    Performed by: TOM Wallace  Patient location during procedure: OR    Indications and Patient Condition  Spontaneous ventilation: present  Sedation level: deep  Preoxygenated: yes  Mask difficulty assessment: 0 - not attempted  Planned trial extubation    Final Airway Details  Final airway type: supraglottic airway      Successful airway: Size 4     Number of attempts at approach: 1  Number of other approaches attempted: 0    Additional Comments  Lips/teeth in pre-anesthetic condition. IGEL LMA used

## 2024-02-26 NOTE — ANESTHESIA PREPROCEDURE EVALUATION
Patient: Kirsty Shrestha    Procedure Information       Date/Time: 02/26/24 1130    Procedure: Left Breast Reconstruction Revision w/Implant (Left)    Location: [facility] / [facility]    Surgeons: Galileo Barker MD            Relevant Problems   Anesthesia (within normal limits)      Cardiovascular   (+) Breast pain   (+) Hypertension complicating pregnancy      Endocrine   (+) Obesity      GI   (+) Esophageal reflux      /Renal (within normal limits)      Neuro/Psych   (+) Anxiety   (+) Carpal tunnel syndrome   (+) Chronic lumbar radiculopathy   (+) Depression      Pulmonary   (+) CHRISTA (obstructive sleep apnea)      GI/Hepatic (within normal limits)      Musculoskeletal   (+) Carpal tunnel syndrome      Infectious Disease   (+) Bacterial vaginosis   (+) Hidradenitis suppurativa of left axilla   (+) Infection due to Chlamydia species   (+) Onychomycosis of toenail   (+) Tinea pedis of both feet       Clinical information reviewed:   Tobacco  Allergies  Meds   Med Hx  Surg Hx   Fam Hx  Soc Hx        NPO Detail:  NPO/Void Status  Date of Last Liquid: 02/26/24  Time of Last Liquid: 0730  Date of Last Solid: 02/25/24  Time of Last Solid: 2300         Physical Exam    Airway  Mallampati: III  TM distance: >3 FB     Cardiovascular   Rhythm: regular  Rate: normal     Dental - normal exam     Pulmonary   Breath sounds clear to auscultation     Abdominal            Anesthesia Plan    History of general anesthesia?: yes  History of complications of general anesthesia?: no    ASA 2     MAC     Anesthetic plan and risks discussed with patient.    Plan discussed with [facility].

## 2024-02-27 ENCOUNTER — OFFICE VISIT (OUTPATIENT)
Dept: HEMATOLOGY/ONCOLOGY | Facility: HOSPITAL | Age: 34
End: 2024-02-27
Payer: COMMERCIAL

## 2024-02-27 VITALS
OXYGEN SATURATION: 97 % | SYSTOLIC BLOOD PRESSURE: 118 MMHG | TEMPERATURE: 97.9 F | HEIGHT: 63 IN | HEART RATE: 78 BPM | WEIGHT: 211.42 LBS | BODY MASS INDEX: 37.46 KG/M2 | RESPIRATION RATE: 17 BRPM | DIASTOLIC BLOOD PRESSURE: 78 MMHG

## 2024-02-27 DIAGNOSIS — C50.912 MALIGNANT NEOPLASM OF LEFT BREAST IN FEMALE, ESTROGEN RECEPTOR POSITIVE, UNSPECIFIED SITE OF BREAST (MULTI): ICD-10-CM

## 2024-02-27 DIAGNOSIS — Z17.0 MALIGNANT NEOPLASM OF LEFT BREAST IN FEMALE, ESTROGEN RECEPTOR POSITIVE, UNSPECIFIED SITE OF BREAST (MULTI): ICD-10-CM

## 2024-02-27 PROCEDURE — 3008F BODY MASS INDEX DOCD: CPT | Performed by: INTERNAL MEDICINE

## 2024-02-27 PROCEDURE — 3074F SYST BP LT 130 MM HG: CPT | Performed by: INTERNAL MEDICINE

## 2024-02-27 PROCEDURE — 99215 OFFICE O/P EST HI 40 MIN: CPT | Performed by: INTERNAL MEDICINE

## 2024-02-27 PROCEDURE — 1036F TOBACCO NON-USER: CPT | Performed by: INTERNAL MEDICINE

## 2024-02-27 PROCEDURE — 3078F DIAST BP <80 MM HG: CPT | Performed by: INTERNAL MEDICINE

## 2024-02-27 ASSESSMENT — PAIN SCALES - GENERAL: PAINLEVEL: 0-NO PAIN
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
## 2024-02-29 ENCOUNTER — TELEPHONE (OUTPATIENT)
Dept: SURGERY | Facility: HOSPITAL | Age: 34
End: 2024-02-29
Payer: COMMERCIAL

## 2024-02-29 LAB
LABORATORY COMMENT REPORT: NORMAL
PATH REPORT.FINAL DX SPEC: NORMAL
PATH REPORT.GROSS SPEC: NORMAL
PATH REPORT.RELEVANT HX SPEC: NORMAL
PATH REPORT.TOTAL CANCER: NORMAL

## 2024-02-29 NOTE — TELEPHONE ENCOUNTER
"Result Communication    Resulted Orders   Surgical Pathology Exam   Result Value Ref Range    Case Report       Surgical Pathology                                Case: [id number]                                  Authorizing Provider:  Renee Pugh MD            Collected:           02/21/2024 1523              Ordering Location:     [facility] Received:            02/21/2024 1612                                     OR                                                                           Pathologist:           Yogesh White MD                                                           Specimens:   A) - BREAST LUMPECTOMY LEFT, Re-excision lateral left breast , short=superior,                      long=lateral                                                                                        B) - BREAST IMPLANT LEFT, Left breast implant                                              FINAL DIAGNOSIS         A. LEFT LATERAL BREAST, RE-EXCISION:   Findings consistent with previous operative site, negative for malignancy.    B. LEFT BREAST IMPLANT, REMOVAL:   Breast implant (gross examination).              By the signature on this report, the individual or group listed as making the Final Interpretation/Diagnosis certifies that they have reviewed this case.       Clinical History       Pre-op diagnosis:  Malignant neoplasm of left breast in female, estrogen receptor positive, unspecified site of breast (CMS/[facility]) [C50.912, Z17.0]      Gross Description       A: Received fresh labeled with the patient's name and hospital number and \"reexcision lateral left breast, short equals superior, long equals lateral\", is an irregular segment of yellow lobulated fibrofatty tissue, 2.8 (medial-lateral) x 2.1 (superior-inferior) x 0.8 (anterior-posterior) cm.  A skin ellipse is not present.  The specimen is oriented with a short superior and long lateral stitch.  A localizing needle is not identified.  There is " "scant fibrous tissue and hemorrhage on the posterior aspect and a previous excisional surface is not identified.  The specimen is inked in the following manner: Anterior green, posterior black, superior violet, inferior blue, medial yellow, and lateral orange.  The specimen is serially sectioned from medial to lateral.  The cut surface reveals rubbery fat necrosis and hemorrhage grossly abutting the posterior margin and involving an area measuring 1.4 x 1.2 x 0.6 cm.  A lesion is not identified, grossly.  The specimen is entirely submitted from medial to lateral in 5 cassettes.  [facility]    NOTE:  Ischemia time: Not provided.  This specimen was placed into formalin at: Not provided.    Summary of Cassettes:  Specimen Label Site  A  1 medial margin, shave    2-4 sequential full-thickness sections medial to lateral    5 lateral margin, shave  B:  Received on a brush, labeled with the patient's name and hospital number and \"left breast implant\", is a focally ruptured breast implant measuring 14.5 x 14.5 x 5.0 cm. It contains clear thin fluid consistent with saline. The inscription \"IDEAL IMPLANT 595 ml [id number]\" is present. Soft tissue is not attached to the implant.  Photographs have been taken.  The specimen is for gross examination only.  [facility]    Gross dissection performed at:  [place] [address]  Phone: [phone]  Fax: [phone]         3:20 PM      Results were successfully communicated with the patient and they acknowledged their understanding.    "

## 2024-03-01 LAB
LABORATORY COMMENT REPORT: NORMAL
PATH REPORT.FINAL DX SPEC: NORMAL
PATH REPORT.GROSS SPEC: NORMAL
PATH REPORT.RELEVANT HX SPEC: NORMAL
PATH REPORT.TOTAL CANCER: NORMAL
RESIDENT REVIEW: NORMAL

## 2024-03-04 ENCOUNTER — OFFICE VISIT (OUTPATIENT)
Dept: PLASTIC SURGERY | Facility: CLINIC | Age: 34
End: 2024-03-04
Payer: COMMERCIAL

## 2024-03-04 VITALS
WEIGHT: 211 LBS | DIASTOLIC BLOOD PRESSURE: 82 MMHG | HEIGHT: 63 IN | HEART RATE: 91 BPM | BODY MASS INDEX: 37.39 KG/M2 | SYSTOLIC BLOOD PRESSURE: 120 MMHG

## 2024-03-04 DIAGNOSIS — C50.912 CHEST WALL RECURRENCE OF BREAST CANCER, LEFT (MULTI): Primary | ICD-10-CM

## 2024-03-04 DIAGNOSIS — C79.89 CHEST WALL RECURRENCE OF BREAST CANCER, LEFT (MULTI): Primary | ICD-10-CM

## 2024-03-04 PROCEDURE — 1036F TOBACCO NON-USER: CPT | Performed by: PHYSICIAN ASSISTANT

## 2024-03-04 PROCEDURE — 3008F BODY MASS INDEX DOCD: CPT | Performed by: PHYSICIAN ASSISTANT

## 2024-03-04 PROCEDURE — 3079F DIAST BP 80-89 MM HG: CPT | Performed by: PHYSICIAN ASSISTANT

## 2024-03-04 PROCEDURE — 3074F SYST BP LT 130 MM HG: CPT | Performed by: PHYSICIAN ASSISTANT

## 2024-03-04 PROCEDURE — 99024 POSTOP FOLLOW-UP VISIT: CPT | Performed by: PHYSICIAN ASSISTANT

## 2024-03-04 NOTE — PROGRESS NOTES
Department of Plastic and Reconstructive Surgery            Post Operative Visit    Date: 03/04/24  Date of Surgery: 2/26/24    Subjective   Kirsty Shrestha is a 33 y.o. female who presents for POV. They are s/p left delayed reconstruction 610cc saline IDEAL implant placement on 2/26/24 with Dr. Barker.       She presents for POV. She endorse well controlled post op pain. She notes her MARII drain output the past 3 days have been around 30cc however the output has been bloody in color. She endorses tightness and swelling of the left breast skin.       Objective   Vital Signs:   Vitals:    03/04/24 0955   BP: 120/82   Pulse: 91       Gen: interactive and pleasant  Head: NCAT  Eyes: EOMI, PERRLA  Mouth: MMM  Throat: trachea midline  Cor: RRR  Pulm: nonlabored breathing  Abd: s/nt/nd  Neuro: AAOx3  Ext: extremities perfused    Focused exam of the: breast    Left breast with surgical incision that is clean, dry, intact with dermabond and pernio. There is no overlying breast erythema or concerns for infection.  There is no evidence of wound dehiscence. There is moderate edema of the left breast. There is firmness to palpation over the superior aspect of the breast. MARII drain x1 with dark bloody output.     Assessment/Plan     Kirsty Shrestha is a 33 y.o. female who presents for POV. They are s/p left delayed reconstruction 610cc saline IDEAL implant placement on 2/26/24 with Dr. Barker.       She presents for POV. Drain output has been around 30cc for 2 consecutive days. However given bloody output and superior breast fullness will plan to leave drain in for an additional week until output 20cc or less for 2 consecutive days. She was informed if there are changes to her drain output to contact our office. She is ok to apply heat to the left breast and perform gentle breast massage.     Plan:   Follow up in 1 week  Continue drain care    I spent 20 minutes with this patient. Greater than 50% of this  time was spent in the counselling and/or coordination of care of this patient.  This note was created using voice recognition software and was not corrected for typographical or grammatical errors.    Signature: Roslyn Bermudez PA-C  Date: 03/04/24

## 2024-03-04 NOTE — PROGRESS NOTES
Flex Shrestha is a 33 y.o. female here for a postoperative visit.  She had a reexcision of a left chest wall mass and removal of her implant on February 21, 2024.  The final pathology shows no residual tumor send we have clear margins.  She has some tightness in her left axilla.    She had the left implant replaced on February 26, 2024 by Dr. Barker.      She had a left lumpectomy and sentinel lymph node biopsy on January 31, 2024.   Findings at that time were the following:   Tumor size: multiple (3.2 cm, 1.1 cm, 0.7 cm, 0.4 cm )  Tumor thgthrthathdtheth:th th4th Estrogen Receptor: >95%   Progesterone Receptor: >95%   Her-2 contreras: neg   Lymph node status: 0/1   Lymphovascular invasion: Extensive     Cancer History:   Treatment Synopsis:    Diagnosis: April 2022 Invasive ductal carcinoma of the left breast; ER >95%, OH >95%, Her2-negative  Stage: IA (pT1b pN0)  Mammaprint: High-risk, luminal B, -0.615     -Palpable lump in left breast.  -4/8/22: Dx MG/US performed. A 1.3 x 0.6 x 0.7 cm hypoechoic mass seen in left breast (11:00, 13 cm FN). An additional 0.9 cm mass at 2:00 (8 cm FN) was also seen, possibly benign. ALN’s appeared unremarkable.  -4/20/22: Left breast mass biopsy (11:00) showed IDC, grade 3. ER >95%, OH >95%, Her2-negative (2+ IHC; MAHNAZ ratio 1.9, copy #3.6). Mammaprint high-risk (-0.615), luminal B type. Maximum diameter 0.7 cm.  -4/28/22: Biopsy of 2:00 lesion benign. Left ALN negative.  -7/14/22: Bilateral mastectomy with implant reconstruction and left SLNBx performed. No residual carcinoma seen in left breast. Right breast benign. 0/1 left SLN’s involved. pT1bN0 (IA).     December 2023 Invasive ductal carcinoma, grade 3; ER greater than 95%, OH greater than 95%, HER2 negative     -1/31/23: Wide excision of left chest wall recurrence and SLNB.  Multiple tumors the largest being 3.2 cm- positive lateral margin; invasive ductal carcinoma, grade 3; ER> 95%, OH> 95%, HER2 negative ; SLN 0/1  -2/22/24:  reexcision of lateral margin (negative) and removal of left breast implant  -2/26/24: Capsulectomy and placement of new left breast implant              Objective   Physical Exam        Alert and oriented.  Breast exam-the incision is healing well.  There is no hematoma or seroma.  No infection.  Small cord in left axilla.  No lymphadenopathy or lymphedema.           Assessment/Plan   Stage IIA recurrent left breast cancer              -[id number]              Invasive ductal carcinoma, grade 3; ER greater than 95%, ID greater than 95%, HER2 negative     Pathology was reviewed.   We now have clear margins.    Initial left breast cancer diagnosed in April 2022.  Recurrent multifocal left breast cancer diagnosed in December 2023.  Invasive ductal carcinoma, grade 3; ER greater than 95%, ID greater than 95%, HER2 negative     She has met with Dr. Carrasquillo and will begin chemotherapy on April 2  She has an appointment with Dr. Mathews next week.    Follow-up with me in September 2024.      Renee Pugh MD

## 2024-03-05 ENCOUNTER — OFFICE VISIT (OUTPATIENT)
Dept: HEMATOLOGY/ONCOLOGY | Facility: HOSPITAL | Age: 34
End: 2024-03-05
Payer: COMMERCIAL

## 2024-03-05 VITALS
HEIGHT: 63 IN | RESPIRATION RATE: 20 BRPM | HEART RATE: 82 BPM | TEMPERATURE: 97.2 F | BODY MASS INDEX: 37.27 KG/M2 | SYSTOLIC BLOOD PRESSURE: 120 MMHG | DIASTOLIC BLOOD PRESSURE: 76 MMHG | OXYGEN SATURATION: 95 % | WEIGHT: 210.32 LBS

## 2024-03-05 DIAGNOSIS — C50.812 MALIGNANT NEOPLASM OF OVERLAPPING SITES OF LEFT BREAST IN FEMALE, ESTROGEN RECEPTOR POSITIVE (MULTI): Primary | ICD-10-CM

## 2024-03-05 DIAGNOSIS — Z17.0 MALIGNANT NEOPLASM OF OVERLAPPING SITES OF LEFT BREAST IN FEMALE, ESTROGEN RECEPTOR POSITIVE (MULTI): Primary | ICD-10-CM

## 2024-03-05 DIAGNOSIS — Z17.0 MALIGNANT NEOPLASM OF LEFT BREAST IN FEMALE, ESTROGEN RECEPTOR POSITIVE, UNSPECIFIED SITE OF BREAST (MULTI): ICD-10-CM

## 2024-03-05 DIAGNOSIS — C50.912 MALIGNANT NEOPLASM OF LEFT BREAST IN FEMALE, ESTROGEN RECEPTOR POSITIVE, UNSPECIFIED SITE OF BREAST (MULTI): ICD-10-CM

## 2024-03-05 PROCEDURE — 3078F DIAST BP <80 MM HG: CPT | Performed by: INTERNAL MEDICINE

## 2024-03-05 PROCEDURE — 99215 OFFICE O/P EST HI 40 MIN: CPT | Performed by: INTERNAL MEDICINE

## 2024-03-05 PROCEDURE — 3074F SYST BP LT 130 MM HG: CPT | Performed by: INTERNAL MEDICINE

## 2024-03-05 PROCEDURE — 3008F BODY MASS INDEX DOCD: CPT | Performed by: INTERNAL MEDICINE

## 2024-03-05 PROCEDURE — 1036F TOBACCO NON-USER: CPT | Performed by: INTERNAL MEDICINE

## 2024-03-05 RX ORDER — FAMOTIDINE 10 MG/ML
20 INJECTION INTRAVENOUS ONCE AS NEEDED
Status: CANCELLED | OUTPATIENT
Start: 2024-05-14

## 2024-03-05 RX ORDER — EPINEPHRINE 0.3 MG/.3ML
0.3 INJECTION SUBCUTANEOUS EVERY 5 MIN PRN
Status: CANCELLED | OUTPATIENT
Start: 2024-05-14

## 2024-03-05 RX ORDER — HEPARIN 100 UNIT/ML
500 SYRINGE INTRAVENOUS AS NEEDED
Status: CANCELLED | OUTPATIENT
Start: 2024-04-02

## 2024-03-05 RX ORDER — HEPARIN SODIUM,PORCINE/PF 10 UNIT/ML
50 SYRINGE (ML) INTRAVENOUS AS NEEDED
Status: CANCELLED | OUTPATIENT
Start: 2024-04-02

## 2024-03-05 RX ORDER — ALBUTEROL SULFATE 0.83 MG/ML
3 SOLUTION RESPIRATORY (INHALATION) AS NEEDED
Status: CANCELLED | OUTPATIENT
Start: 2024-05-14

## 2024-03-05 RX ORDER — ONDANSETRON HYDROCHLORIDE 8 MG/1
8 TABLET, FILM COATED ORAL EVERY 8 HOURS PRN
Qty: 30 TABLET | Refills: 5 | Status: SHIPPED | OUTPATIENT
Start: 2024-03-05 | End: 2024-03-19

## 2024-03-05 RX ORDER — LIDOCAINE HYDROCHLORIDE 10 MG/ML
2 INJECTION, SOLUTION EPIDURAL; INFILTRATION; INTRACAUDAL; PERINEURAL ONCE
Status: CANCELLED | OUTPATIENT
Start: 2024-05-14

## 2024-03-05 RX ORDER — PROCHLORPERAZINE MALEATE 10 MG
10 TABLET ORAL EVERY 6 HOURS PRN
Qty: 30 TABLET | Refills: 5 | Status: SHIPPED | OUTPATIENT
Start: 2024-03-05 | End: 2024-03-19

## 2024-03-05 RX ORDER — DIPHENHYDRAMINE HYDROCHLORIDE 50 MG/ML
50 INJECTION INTRAMUSCULAR; INTRAVENOUS AS NEEDED
Status: CANCELLED | OUTPATIENT
Start: 2024-05-14

## 2024-03-05 ASSESSMENT — PAIN SCALES - GENERAL: PAINLEVEL: 0-NO PAIN
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
## 2024-03-06 ENCOUNTER — TELEPHONE (OUTPATIENT)
Dept: PLASTIC SURGERY | Facility: HOSPITAL | Age: 34
End: 2024-03-06
Payer: COMMERCIAL

## 2024-03-06 PROBLEM — Z17.0 ESTROGEN RECEPTOR POSITIVE STATUS (ER+): Status: ACTIVE | Noted: 2024-02-16

## 2024-03-06 PROBLEM — G89.18 OTHER ACUTE POSTPROCEDURAL PAIN: Status: ACTIVE | Noted: 2024-02-26

## 2024-03-06 PROBLEM — K21.9 GASTROESOPHAGEAL REFLUX DISEASE: Status: ACTIVE | Noted: 2023-10-12

## 2024-03-06 RX ORDER — TRAMADOL HYDROCHLORIDE 50 MG/1
TABLET ORAL EVERY 6 HOURS PRN
COMMUNITY
Start: 2024-01-31 | End: 2024-03-08 | Stop reason: ALTCHOICE

## 2024-03-06 NOTE — TELEPHONE ENCOUNTER
Patient called stating her drain lost suction. Instructed to make an occlusive seal, however, not successful. States her drain has been downtrending (roughly 25cc today) prior to losing suction. Notes drain color has been same since she was seen on Monday. Denies increased pain, swelling to L breast. Advised to come to clinic tomorrow for assessment.

## 2024-03-08 ENCOUNTER — LAB (OUTPATIENT)
Dept: LAB | Facility: HOSPITAL | Age: 34
End: 2024-03-08
Payer: COMMERCIAL

## 2024-03-08 ENCOUNTER — OFFICE VISIT (OUTPATIENT)
Dept: SURGICAL ONCOLOGY | Facility: CLINIC | Age: 34
End: 2024-03-08
Payer: COMMERCIAL

## 2024-03-08 ENCOUNTER — TELEPHONE (OUTPATIENT)
Dept: RADIATION ONCOLOGY | Facility: HOSPITAL | Age: 34
End: 2024-03-08

## 2024-03-08 ENCOUNTER — OFFICE VISIT (OUTPATIENT)
Dept: HEMATOLOGY/ONCOLOGY | Facility: HOSPITAL | Age: 34
End: 2024-03-08
Payer: COMMERCIAL

## 2024-03-08 DIAGNOSIS — Z17.0 MALIGNANT NEOPLASM OF LEFT BREAST IN FEMALE, ESTROGEN RECEPTOR POSITIVE, UNSPECIFIED SITE OF BREAST (MULTI): Primary | ICD-10-CM

## 2024-03-08 DIAGNOSIS — Z91.89 AT RISK FOR INFERTILITY: ICD-10-CM

## 2024-03-08 DIAGNOSIS — Z31.62 ENCOUNTER FOR FERTILITY PRESERVATION COUNSELING: ICD-10-CM

## 2024-03-08 DIAGNOSIS — C50.912 CHEST WALL RECURRENCE OF BREAST CANCER, LEFT (MULTI): Primary | ICD-10-CM

## 2024-03-08 DIAGNOSIS — Z17.0 MALIGNANT NEOPLASM OF LEFT BREAST IN FEMALE, ESTROGEN RECEPTOR POSITIVE, UNSPECIFIED SITE OF BREAST (MULTI): ICD-10-CM

## 2024-03-08 DIAGNOSIS — C50.912 MALIGNANT NEOPLASM OF LEFT BREAST IN FEMALE, ESTROGEN RECEPTOR POSITIVE, UNSPECIFIED SITE OF BREAST (MULTI): Primary | ICD-10-CM

## 2024-03-08 DIAGNOSIS — C79.89 CHEST WALL RECURRENCE OF BREAST CANCER, LEFT (MULTI): Primary | ICD-10-CM

## 2024-03-08 DIAGNOSIS — C50.912 MALIGNANT NEOPLASM OF LEFT BREAST IN FEMALE, ESTROGEN RECEPTOR POSITIVE, UNSPECIFIED SITE OF BREAST (MULTI): ICD-10-CM

## 2024-03-08 LAB
ESTRADIOL SERPL-MCNC: 38 PG/ML
FSH SERPL-ACNC: 5.2 IU/L
HBV SURFACE AG SERPL QL IA: NONREACTIVE
HIV 1+2 AB+HIV1 P24 AG SERPL QL IA: NONREACTIVE
HOLD SPECIMEN: NORMAL
LH SERPL-ACNC: 4.2 IU/L
TREPONEMA PALLIDUM IGG+IGM AB [PRESENCE] IN SERUM OR PLASMA BY IMMUNOASSAY: NONREACTIVE

## 2024-03-08 PROCEDURE — 36415 COLL VENOUS BLD VENIPUNCTURE: CPT

## 2024-03-08 PROCEDURE — 99417 PROLNG OP E/M EACH 15 MIN: CPT | Performed by: NURSE PRACTITIONER

## 2024-03-08 PROCEDURE — 3008F BODY MASS INDEX DOCD: CPT | Performed by: NURSE PRACTITIONER

## 2024-03-08 PROCEDURE — 3008F BODY MASS INDEX DOCD: CPT | Performed by: SURGERY

## 2024-03-08 PROCEDURE — 83001 ASSAY OF GONADOTROPIN (FSH): CPT

## 2024-03-08 PROCEDURE — 99215 OFFICE O/P EST HI 40 MIN: CPT | Performed by: NURSE PRACTITIONER

## 2024-03-08 PROCEDURE — 1036F TOBACCO NON-USER: CPT | Performed by: SURGERY

## 2024-03-08 PROCEDURE — 87522 HEPATITIS C REVRS TRNSCRPJ: CPT

## 2024-03-08 PROCEDURE — 86780 TREPONEMA PALLIDUM: CPT

## 2024-03-08 PROCEDURE — 1036F TOBACCO NON-USER: CPT | Performed by: NURSE PRACTITIONER

## 2024-03-08 PROCEDURE — 87389 HIV-1 AG W/HIV-1&-2 AB AG IA: CPT

## 2024-03-08 PROCEDURE — 99215 OFFICE O/P EST HI 40 MIN: CPT | Mod: 25 | Performed by: NURSE PRACTITIONER

## 2024-03-08 PROCEDURE — 87340 HEPATITIS B SURFACE AG IA: CPT

## 2024-03-08 PROCEDURE — 82670 ASSAY OF TOTAL ESTRADIOL: CPT

## 2024-03-08 PROCEDURE — 99024 POSTOP FOLLOW-UP VISIT: CPT | Performed by: SURGERY

## 2024-03-08 PROCEDURE — 83002 ASSAY OF GONADOTROPIN (LH): CPT

## 2024-03-08 PROCEDURE — 87800 DETECT AGNT MULT DNA DIREC: CPT

## 2024-03-08 PROCEDURE — 83520 IMMUNOASSAY QUANT NOS NONAB: CPT

## 2024-03-08 ASSESSMENT — ENCOUNTER SYMPTOMS
FEVER: 0
NERVOUS/ANXIOUS: 0
DEPRESSION: 0
CONFUSION: 0
CHILLS: 0
FATIGUE: 0

## 2024-03-08 ASSESSMENT — PATIENT HEALTH QUESTIONNAIRE - PHQ9
1. LITTLE INTEREST OR PLEASURE IN DOING THINGS: NOT AT ALL
SUM OF ALL RESPONSES TO PHQ9 QUESTIONS 1 AND 2: 0
2. FEELING DOWN, DEPRESSED OR HOPELESS: NOT AT ALL

## 2024-03-08 NOTE — PROGRESS NOTES
Patient ID: Kirsty Shrestha is a 33 y.o. female.  Referring Physician: Julia Carrasquillo MD  [id number] [id number]  Primary Care Provider: Caesar Hu MD  Oncologic History:  4/8/22: Dx MG/US performed. A 1.3 x 0.6 x 0.7 cm hypoechoic mass seen in left breast (11:00, 13 cm FN). An additional 0.9 cm mass at 2:00 (8 cm FN) was also seen, possibly benign. ALN’s appeared unremarkable.  4/20/22: Left breast mass biopsy (11:00) showed IDC, grade 3. ER >95%, OH >95%, Her2-negative (2+ IHC; MAHNAZ ratio 1.9, copy #3.6). Mammaprint high-risk (-0.615), luminal B type. Maximum diameter 0.7 cm.  4/28/22: Biopsy of 2:00 lesion benign. Left ALN negative.  Genetic Testing completed. Negative  5/23/2023 bilateral stage 2 breast recosntruction with removal of TE and placement of new IDEA: saline implants 610cc with autologous fat grafting of 160cc per breast and abdominoplasty with rectus plication and umbilical transposition   7/14/22: Bilateral mastectomy with implant reconstruction and left SLNBx performed. No residual carcinoma seen in left breast. Right breast benign. 0/1 left SLN’s involved. pT1bN0 (IA).  08/18/2022: Started on Tamoxifen 20 mg daily by Dr. Connell. Patient did not follow-up with medical oncology after that and stopped Tamoxifen after her prescription run out  12/22/2023: Patient underwent an US-guided left breast biopsy of a cystic mass at 12:00. Pathology was consistent with Invasive ductal carcinoma, grade 3; ER greater than 95%, OH greater than 95%, HER2 negative   1/31/2024: Patient underwent left breast lumpectomy/wide excision of breast cancer and SLNB. Pathology revealed multifocal disease with 4 separate nodules measuring 32, 11, 7 and 4 mm respectively with 0/1 SLNs involved. With invasive cancer present at lateral margin, within microns of posterior margin and <1mm of anterior and medial margins. Repeat receptor of mass at 12:00 showed Estrogen Receptor positive at 95%, Progesteron  receptor at >95% and her2 equivocal and not amplified at 1.1    2024: Patient underwent re-excision    Subjective    Ms. Shrestha is a pleasant 33 y.o. premenopausal AA female with prior Stage: IA (pT1b pN0) invasive ductal carcinoma of the left breast; ER >95%, OK >95%, Her2-negative, Mammaprint: High-risk, luminal B, -0.615. Now with locally recurrent left invasive ductal carcinoma, stage IB (rPT2, pN0 M0).  The patient's recurrent breast cancer was diagnosed on 2023, and is grade 3, Estrogen Receptor positive at 95%, Progesteron receptor at >95% and her2 equivocal.    Referred today by: Dr. 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   LMP: 24  Cycle Length: periods on mirena, previously did not have a period, now monthly, light periods, 5 days, not heavy, moderate cramping                                 Premenstrual spotting: no                    Dysmenorrhea: no  Dyspareunia: no  Abnl paps: no  Last pap:   STD: Yes  Contraception: mirena currently, OCP, 16y.o.   Hx PCOS: no  Hx cysts: yes  Hx endometriosis: no  Hx fibroids: no  Previous mammogram: no  Sexual activity: yes    Endocrine/Infertility History:  Hx of infertility: no  Nipple Discharge: no  Vision changes / headaches: no  Excess hair  growth: no  Acne/oily skin: acne  Recent weight change: no  Exercise: yes rides bike,  aerobics,      Family History:  Zoroastrian heritage: No  Ethnic background:   Breast ca: MGM 60's, 2nd maternal cousin 30's  Ovarian ca: no  Other gyn ca: no  Colon ca: no  VTE < 50 years: no  MI or stroke < 50: no MGF MI and stroke in 40's  CF: no  MR: no  Sickle cell disease: no  SIblings: 1 brother/sister with mom, 1 sister 3 brother all 1/2 sibs , Children: 1, Trouble conceiving or miscarriages: 1/2 sister on dads side hard time getting pregnant  Genetic diseases/birth defects: no    Social History: Lives in [place] with daughter 12 y.o. Not currently working, previously worked in construction. In a a relationship. Grew up in the area and has family nearby. Does not smoke, vape, or use recreational drugs. Does drink on average 5 drinks/week.    Review of Systems   Constitutional:  Negative for chills, fatigue and fever.   Psychiatric/Behavioral:  Negative for confusion and depression. The patient is not nervous/anxious.         Objective   BSA: There is no height or weight on file to calculate BSA.  LMP 02/19/2024 (Exact Date) Comment: finished the 19th  Performance Status:  Asymptomatic    Current Outpatient Medications   Medication Instructions    fexofenadine-pseudoephedrine (Allegra-D 24) 180-240 mg 24 hr tablet 1 tablet, oral, Daily, Do not crush, chew, or split.    fluticasone (Flonase) 50 mcg/actuation nasal spray nasal    levonorgestrel (Mirena) 21 mcg/24 hours (8 yrs) 52 mg IUD 1 each, intrauterine, Daily RT    ondansetron (ZOFRAN) 8 mg, oral, Every 8 hours PRN    prochlorperazine (COMPAZINE) 10 mg, oral, Every 6 hours PRN       No family history on file.  Oncology History   Malignant neoplasm of breast (CMS/HCC)   11/28/2022 Initial Diagnosis    Malignant neoplasm of breast (CMS/HCC)     4/2/2024 -  Chemotherapy    DOCEtaxel / Cyclophosphamide + Trastuzumab, 21 Day Cycles followed by Trastuzumab,  21 Day Cycles     Chest wall recurrence of breast cancer, left (CMS/HCC)   1/12/2024 Initial Diagnosis    Chest wall recurrence of breast cancer, left (CMS/HCC)     1/31/2024 Cancer Staged    Staging form: Breast, AJCC 8th Edition, Pathologic stage from 1/31/2024: Stage IB (rpT2, pN0(sn), cM0, G3, ER+, CA+, HER2-) - Signed by Julia Carrasquillo MD on 2/27/2024         Kirsty Shrestha  reports that she has never smoked. She has never been exposed to tobacco smoke. She has never used smokeless tobacco.  She  reports current alcohol use.  She  reports no history of drug use.    Physical Exam    No visits with results within 1 Week(s) from this visit.   Latest known visit with results is:   Admission on 02/26/2024, Discharged on 02/26/2024   Component Date Value Ref Range Status    Preg Test, Ur 02/26/2024 Negative  Negative In process    Case Report 02/26/2024    Final                    Value:Surgical Pathology                                Case: [id number]                                  Authorizing Provider:  Galileo Barker MD        Collected:           02/26/2024 1158              Ordering Location:     [facility]  Received:            02/27/2024 0207                                     OR                                                                           Pathologist:           Christine Huizar MD                                                       Specimen:    BREAST CAPSULE LEFT, Left Breast Capsule                                                   FINAL DIAGNOSIS 02/26/2024    Final                    Value:This result contains rich text formatting which cannot be displayed here.      02/26/2024    Final                    Value:This result contains rich text formatting which cannot be displayed here.    Resident Review 02/26/2024    Final                    Value:This result contains rich text formatting which cannot be displayed here.    Clinical History 02/26/2024    Final                     Value:This result contains rich text formatting which cannot be displayed here.    Gross Description 02/26/2024    Final                    Value:This result contains rich text formatting which cannot be displayed here.        Assessment/Plan    Kirsty Shrestha is a 33 y.o. F with prior Stage: IA (pT1b pN0) invasive ductal carcinoma of the left breast; ER >95%, IL >95%, Her2-negative, s/p bilateral mastectomies but lost to follow up on oral endocrine therapy. Represented in December with locally recurrent left invasive ductal carcinoma, stage IB (rPT2, pN0 M0) ER/IL+ HER2 equivocal. S/p L lumpectomy with wide excision 01/2024 followed by re-excision 02/2024 d/t positive margins. With future plans for systemic chemotherapy with docetaxol and cyclophosphamide x 4 cycles, consideration of chest irradiation, and oral endocrine therapy.     #Risk for infertility:  - Discussed the damaging effect cancer treatment can have on the ovaries.   - Discussed natural age related decline in fertility and menopause that occurs as a result of ovarian aging, and that cancer treatments can accellerate that progression and diminish ovarian reserve.  - Individuals may experience varying degrees of short or long term ovarian dysfunction and amenorrhea, and that this is dependent upon type of cancer treatment, cumulative dose, and patients age.   - Loss of ovarian function may be permanent or temporary.  - Amenorrhea may be temporary or permanent -- temporary amenorrhea results from destruction of maturing follicles whereas permanent amenorrhea results from depletion of viable primordial follicles.  - Risk to fertility is INTERMEDIATE based on cancer treatment of Cyclophosphamide at doses ~5000mg    - Discussed options of oocyte vs embryo cryopreservation and use of GnRH agonist.  - Discussed basics of ovarian stimulation and the process of oocyte retrieval.   - Discussed limitations with reproductive technology and that there  is not a guarantee for live birth if we attempt fertility preservation.  - Discussed added challenge of prolonged endocrine therapy 5-10 years at which time she'll be considerably older and impacted by treatment and natural age related declines should she want to try and conceive after completion of all therapies  - Discussed the POSITIVE trial findings and how this may provide us with some guidance, however in her specific scenario with recurrence it is unclear whether a pause from endocrine therapy would be safe given the lack of data in patients with recurrent cancer    - Patient desires fertility preservation to preserve option in future for family building   - Sent initial lab testing, placed urgent referral to [facility] for evaluation  - Patient provided with patient information and oncofertility support fund application    Follow up in 6-8 weeks                      Kari Zhao, PUSHPA-CNP

## 2024-03-08 NOTE — TELEPHONE ENCOUNTER
Called pt to remind of appointment on 3/11/24  at 8:30 Pt's phone went to voicemail left number if needs to reschedule.    Kari Luke MA
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
## 2024-03-10 LAB
C TRACH RRNA SPEC QL NAA+PROBE: NEGATIVE
HCV RNA SERPL NAA+PROBE-ACNC: NOT DETECTED K[IU]/ML
HCV RNA SERPL NAA+PROBE-LOG IU: NORMAL {LOG_IU}/ML
N GONORRHOEA DNA SPEC QL PROBE+SIG AMP: NEGATIVE

## 2024-03-11 ENCOUNTER — APPOINTMENT (OUTPATIENT)
Dept: PLASTIC SURGERY | Facility: CLINIC | Age: 34
End: 2024-03-11
Payer: COMMERCIAL

## 2024-03-11 ENCOUNTER — HOSPITAL ENCOUNTER (OUTPATIENT)
Dept: RADIATION ONCOLOGY | Facility: HOSPITAL | Age: 34
Setting detail: RADIATION/ONCOLOGY SERIES
Discharge: HOME | End: 2024-03-11
Payer: COMMERCIAL

## 2024-03-11 VITALS
WEIGHT: 212.52 LBS | DIASTOLIC BLOOD PRESSURE: 88 MMHG | HEIGHT: 62 IN | SYSTOLIC BLOOD PRESSURE: 138 MMHG | HEART RATE: 85 BPM | BODY MASS INDEX: 39.11 KG/M2 | TEMPERATURE: 96.8 F | OXYGEN SATURATION: 97 % | RESPIRATION RATE: 18 BRPM

## 2024-03-11 DIAGNOSIS — Z17.0 MALIGNANT NEOPLASM OF LEFT BREAST IN FEMALE, ESTROGEN RECEPTOR POSITIVE, UNSPECIFIED SITE OF BREAST (MULTI): ICD-10-CM

## 2024-03-11 DIAGNOSIS — E55.9 VITAMIN D DEFICIENCY: ICD-10-CM

## 2024-03-11 DIAGNOSIS — C50.912 MALIGNANT NEOPLASM OF LEFT BREAST IN FEMALE, ESTROGEN RECEPTOR POSITIVE, UNSPECIFIED SITE OF BREAST (MULTI): ICD-10-CM

## 2024-03-11 DIAGNOSIS — Z17.0 MALIGNANT NEOPLASM OF OVERLAPPING SITES OF LEFT BREAST IN FEMALE, ESTROGEN RECEPTOR POSITIVE (MULTI): ICD-10-CM

## 2024-03-11 DIAGNOSIS — Z17.0 ESTROGEN RECEPTOR POSITIVE STATUS (ER+): ICD-10-CM

## 2024-03-11 DIAGNOSIS — C50.912 CHEST WALL RECURRENCE OF BREAST CANCER, LEFT (MULTI): Primary | ICD-10-CM

## 2024-03-11 DIAGNOSIS — F41.9 ANXIETY: ICD-10-CM

## 2024-03-11 DIAGNOSIS — E88.810 METABOLIC SYNDROME: ICD-10-CM

## 2024-03-11 DIAGNOSIS — E66.09 OBESITY DUE TO EXCESS CALORIES WITHOUT SERIOUS COMORBIDITY, UNSPECIFIED CLASSIFICATION: ICD-10-CM

## 2024-03-11 DIAGNOSIS — C79.89 CHEST WALL RECURRENCE OF BREAST CANCER, LEFT (MULTI): Primary | ICD-10-CM

## 2024-03-11 DIAGNOSIS — C50.812 MALIGNANT NEOPLASM OF OVERLAPPING SITES OF LEFT BREAST IN FEMALE, ESTROGEN RECEPTOR POSITIVE (MULTI): ICD-10-CM

## 2024-03-11 PROCEDURE — 99215 OFFICE O/P EST HI 40 MIN: CPT | Performed by: RADIOLOGY

## 2024-03-11 PROCEDURE — 99205 OFFICE O/P NEW HI 60 MIN: CPT | Performed by: RADIOLOGY

## 2024-03-11 SDOH — HEALTH STABILITY: PHYSICAL HEALTH: ON AVERAGE, HOW MANY DAYS PER WEEK DO YOU ENGAGE IN MODERATE TO STRENUOUS EXERCISE (LIKE A BRISK WALK)?: 6 DAYS

## 2024-03-11 SDOH — HEALTH STABILITY: PHYSICAL HEALTH: ON AVERAGE, HOW MANY MINUTES DO YOU ENGAGE IN EXERCISE AT THIS LEVEL?: 50 MIN

## 2024-03-11 SDOH — ECONOMIC STABILITY: FOOD INSECURITY: WITHIN THE PAST 12 MONTHS, THE FOOD YOU BOUGHT JUST DIDN'T LAST AND YOU DIDN'T HAVE MONEY TO GET MORE.: NEVER TRUE

## 2024-03-11 SDOH — ECONOMIC STABILITY: INCOME INSECURITY: IN THE LAST 12 MONTHS, WAS THERE A TIME WHEN YOU WERE NOT ABLE TO PAY THE MORTGAGE OR RENT ON TIME?: YES

## 2024-03-11 SDOH — ECONOMIC STABILITY: FOOD INSECURITY: WITHIN THE PAST 12 MONTHS, YOU WORRIED THAT YOUR FOOD WOULD RUN OUT BEFORE YOU GOT MONEY TO BUY MORE.: NEVER TRUE

## 2024-03-11 SDOH — ECONOMIC STABILITY: HOUSING INSECURITY: IN THE LAST 12 MONTHS, HOW MANY PLACES HAVE YOU LIVED?: 1

## 2024-03-11 SDOH — ECONOMIC STABILITY: HOUSING INSECURITY
IN THE LAST 12 MONTHS, WAS THERE A TIME WHEN YOU DID NOT HAVE A STEADY PLACE TO SLEEP OR SLEPT IN A SHELTER (INCLUDING NOW)?: NO

## 2024-03-11 SDOH — ECONOMIC STABILITY: TRANSPORTATION INSECURITY
IN THE PAST 12 MONTHS, HAS LACK OF TRANSPORTATION KEPT YOU FROM MEETINGS, WORK, OR FROM GETTING THINGS NEEDED FOR DAILY LIVING?: NO

## 2024-03-11 SDOH — ECONOMIC STABILITY: TRANSPORTATION INSECURITY
IN THE PAST 12 MONTHS, HAS THE LACK OF TRANSPORTATION KEPT YOU FROM MEDICAL APPOINTMENTS OR FROM GETTING MEDICATIONS?: NO

## 2024-03-11 ASSESSMENT — ENCOUNTER SYMPTOMS
DEPRESSION: 0
LOSS OF SENSATION IN FEET: 0
OCCASIONAL FEELINGS OF UNSTEADINESS: 0

## 2024-03-11 ASSESSMENT — SOCIAL DETERMINANTS OF HEALTH (SDOH)
WITHIN THE LAST YEAR, HAVE YOU BEEN HUMILIATED OR EMOTIONALLY ABUSED IN OTHER WAYS BY YOUR PARTNER OR EX-PARTNER?: NO
WITHIN THE LAST YEAR, HAVE TO BEEN RAPED OR FORCED TO HAVE ANY KIND OF SEXUAL ACTIVITY BY YOUR PARTNER OR EX-PARTNER?: NO
WITHIN THE LAST YEAR, HAVE YOU BEEN KICKED, HIT, SLAPPED, OR OTHERWISE PHYSICALLY HURT BY YOUR PARTNER OR EX-PARTNER?: NO
HOW HARD IS IT FOR YOU TO PAY FOR THE VERY BASICS LIKE FOOD, HOUSING, MEDICAL CARE, AND HEATING?: HARD
WITHIN THE LAST YEAR, HAVE YOU BEEN AFRAID OF YOUR PARTNER OR EX-PARTNER?: NO

## 2024-03-11 ASSESSMENT — LIFESTYLE VARIABLES
SKIP TO QUESTIONS 9-10: 0
HOW MANY STANDARD DRINKS CONTAINING ALCOHOL DO YOU HAVE ON A TYPICAL DAY: 5 OR 6
HOW OFTEN DO YOU HAVE A DRINK CONTAINING ALCOHOL: 2-4 TIMES A MONTH
AUDIT-C TOTAL SCORE: 5
HOW OFTEN DO YOU HAVE SIX OR MORE DRINKS ON ONE OCCASION: LESS THAN MONTHLY

## 2024-03-11 ASSESSMENT — COLUMBIA-SUICIDE SEVERITY RATING SCALE - C-SSRS
6. HAVE YOU EVER DONE ANYTHING, STARTED TO DO ANYTHING, OR PREPARED TO DO ANYTHING TO END YOUR LIFE?: NO
1. IN THE PAST MONTH, HAVE YOU WISHED YOU WERE DEAD OR WISHED YOU COULD GO TO SLEEP AND NOT WAKE UP?: NO
2. HAVE YOU ACTUALLY HAD ANY THOUGHTS OF KILLING YOURSELF?: NO

## 2024-03-12 ENCOUNTER — CONSULT (OUTPATIENT)
Dept: ENDOCRINOLOGY | Facility: CLINIC | Age: 34
End: 2024-03-12
Payer: COMMERCIAL

## 2024-03-12 VITALS
BODY MASS INDEX: 39.38 KG/M2 | WEIGHT: 214 LBS | HEIGHT: 62 IN | DIASTOLIC BLOOD PRESSURE: 77 MMHG | SYSTOLIC BLOOD PRESSURE: 121 MMHG | TEMPERATURE: 98.3 F | HEART RATE: 78 BPM

## 2024-03-12 DIAGNOSIS — Z13.71 SCREENING FOR GENETIC DISEASE CARRIER STATUS: ICD-10-CM

## 2024-03-12 DIAGNOSIS — Z31.83 ENCOUNTER FOR ASSISTED REPRODUCTIVE FERTILITY CYCLE: ICD-10-CM

## 2024-03-12 DIAGNOSIS — Z31.62 ENCOUNTER FOR FERTILITY PRESERVATION COUNSELING PRIOR TO CANCER THERAPY: Primary | ICD-10-CM

## 2024-03-12 DIAGNOSIS — N97.9 FEMALE INFERTILITY: ICD-10-CM

## 2024-03-12 DIAGNOSIS — C50.919 MALIGNANT NEOPLASM OF FEMALE BREAST, UNSPECIFIED ESTROGEN RECEPTOR STATUS, UNSPECIFIED LATERALITY, UNSPECIFIED SITE OF BREAST (MULTI): ICD-10-CM

## 2024-03-12 PROCEDURE — 99215 OFFICE O/P EST HI 40 MIN: CPT | Performed by: OBSTETRICS & GYNECOLOGY

## 2024-03-12 ASSESSMENT — PAIN SCALES - GENERAL: PAINLEVEL: 0-NO PAIN

## 2024-03-12 ASSESSMENT — PATIENT HEALTH QUESTIONNAIRE - PHQ9
2. FEELING DOWN, DEPRESSED OR HOPELESS: NOT AT ALL
1. LITTLE INTEREST OR PLEASURE IN DOING THINGS: NOT AT ALL
SUM OF ALL RESPONSES TO PHQ9 QUESTIONS 1 AND 2: 0

## 2024-03-12 ASSESSMENT — LIFESTYLE VARIABLES
TOBACCO_USE: NO
HISTORY_ALCOHOL_USE: YES

## 2024-03-12 ASSESSMENT — COLUMBIA-SUICIDE SEVERITY RATING SCALE - C-SSRS
1. IN THE PAST MONTH, HAVE YOU WISHED YOU WERE DEAD OR WISHED YOU COULD GO TO SLEEP AND NOT WAKE UP?: NO
6. HAVE YOU EVER DONE ANYTHING, STARTED TO DO ANYTHING, OR PREPARED TO DO ANYTHING TO END YOUR LIFE?: NO
2. HAVE YOU ACTUALLY HAD ANY THOUGHTS OF KILLING YOURSELF?: NO

## 2024-03-12 NOTE — Clinical Note
Patient was seen for fertility preservation consultation, see my note for details.  Can you reach out to her at your earliest convenience?  Thank you
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
## 2024-03-13 LAB — MIS SERPL-MCNC: 1.25 NG/ML (ref 0.18–11.71)

## 2024-03-14 ENCOUNTER — TELEPHONE (OUTPATIENT)
Dept: ENDOCRINOLOGY | Facility: CLINIC | Age: 34
End: 2024-03-14
Payer: COMMERCIAL

## 2024-03-14 ENCOUNTER — SPECIALTY PHARMACY (OUTPATIENT)
Dept: PHARMACY | Facility: CLINIC | Age: 34
End: 2024-03-14

## 2024-03-14 ENCOUNTER — TELEPHONE (OUTPATIENT)
Dept: ENDOCRINOLOGY | Facility: CLINIC | Age: 34
End: 2024-03-14

## 2024-03-14 DIAGNOSIS — N97.9 FEMALE INFERTILITY: ICD-10-CM

## 2024-03-14 DIAGNOSIS — Z01.812 PRE-PROCEDURE LAB EXAM: ICD-10-CM

## 2024-03-14 RX ORDER — CHORIONIC GONADOTROPIN 10000 UNIT
10000 KIT INTRAMUSCULAR ONCE AS NEEDED
Qty: 1 EACH | Refills: 0 | Status: SHIPPED | OUTPATIENT
Start: 2024-03-14 | End: 2024-03-14 | Stop reason: SDUPTHER

## 2024-03-14 RX ORDER — CHORIONIC GONADOTROPIN 10000 UNIT
10000 KIT INTRAMUSCULAR ONCE AS NEEDED
Qty: 1 EACH | Refills: 0 | Status: SHIPPED | OUTPATIENT
Start: 2024-03-14 | End: 2024-04-16 | Stop reason: ALTCHOICE

## 2024-03-14 RX ORDER — LEUPROLIDE ACETATE 1 MG/0.2ML
4 KIT SUBCUTANEOUS AS NEEDED
Qty: 1 KIT | Refills: 0 | Status: SHIPPED | OUTPATIENT
Start: 2024-03-14 | End: 2024-04-16 | Stop reason: ALTCHOICE

## 2024-03-14 RX ORDER — GANIRELIX ACETATE 250 UG/.5ML
250 INJECTION, SOLUTION SUBCUTANEOUS EVERY MORNING
Qty: 5 EACH | Refills: 0 | Status: SHIPPED | OUTPATIENT
Start: 2024-03-14 | End: 2024-04-16 | Stop reason: ALTCHOICE

## 2024-03-14 RX ORDER — LETROZOLE 2.5 MG/1
5 TABLET, FILM COATED ORAL DAILY
Qty: 60 TABLET | Refills: 0 | Status: SHIPPED | OUTPATIENT
Start: 2024-03-14 | End: 2024-04-16 | Stop reason: ALTCHOICE

## 2024-03-14 RX ORDER — GONADOTROPHIN, CHORIONIC 5000 UNIT
10000 KIT INTRAMUSCULAR ONCE AS NEEDED
Qty: 2 EACH | Refills: 0 | Status: SHIPPED
Start: 2024-03-14 | End: 2024-04-16 | Stop reason: ALTCHOICE

## 2024-03-14 RX ORDER — GANIRELIX ACETATE 250 UG/.5ML
250 INJECTION, SOLUTION SUBCUTANEOUS EVERY MORNING
Qty: 5 EACH | Refills: 0 | Status: SHIPPED | OUTPATIENT
Start: 2024-03-14 | End: 2024-03-14 | Stop reason: SDUPTHER

## 2024-03-14 NOTE — TELEPHONE ENCOUNTER
Reason for call:   Notes: Pharmacy needs medication verification on 3 meds novareal,menapurin and grenirelix  Phone number [phone]

## 2024-03-14 NOTE — TELEPHONE ENCOUNTER
RN called patient to review egg preservation next steps/ process. RN left patient a voicemail and encouraged patient to please call the office back.   Julia Nguyen RN 03/14/24 8:57 AM

## 2024-03-14 NOTE — TELEPHONE ENCOUNTER
RN returned patient's phone call. Patient is new oncofertility patient. RN reviewed Reunite program, [facility] Program,  onco eliezer application. Patient has all paperwork completed for these programs, except the patient portion of the reunite program. Patient also informed she needs to complete Engaged MD paperwork. Patient plans to be out of town on a trip the week of March 25, patient would like to baseline April 1st. Patient will complete the required paperwork and has no further questions at this time.   Julia Nguyen RN 03/14/24 12:40 PM

## 2024-03-14 NOTE — TELEPHONE ENCOUNTER
RN returned pharmacy's phone call. [facility] program needed to verify prescriptions that were faxed. RN reviewed medications with pharmacy and answered questions. Patient will now be sent through verification process and will be notified if she is accepted.   Julia Nguyen RN 03/14/24 2:22 PM

## 2024-03-19 DIAGNOSIS — C50.912 MALIGNANT NEOPLASM OF LEFT BREAST IN FEMALE, ESTROGEN RECEPTOR POSITIVE, UNSPECIFIED SITE OF BREAST (MULTI): ICD-10-CM

## 2024-03-19 DIAGNOSIS — Z17.0 MALIGNANT NEOPLASM OF LEFT BREAST IN FEMALE, ESTROGEN RECEPTOR POSITIVE, UNSPECIFIED SITE OF BREAST (MULTI): ICD-10-CM

## 2024-03-22 ENCOUNTER — SPECIALTY PHARMACY (OUTPATIENT)
Dept: PHARMACY | Facility: CLINIC | Age: 34
End: 2024-03-22

## 2024-03-22 DIAGNOSIS — N97.9 FEMALE INFERTILITY: ICD-10-CM

## 2024-03-22 PROCEDURE — RXMED WILLOW AMBULATORY MEDICATION CHARGE

## 2024-03-22 RX ORDER — GANIRELIX ACETATE 250 UG/.5ML
250 INJECTION, SOLUTION SUBCUTANEOUS EVERY MORNING
Qty: 5 EACH | Refills: 2 | Status: SHIPPED | OUTPATIENT
Start: 2024-03-22 | End: 2024-04-16 | Stop reason: ALTCHOICE

## 2024-03-25 ENCOUNTER — OFFICE VISIT (OUTPATIENT)
Dept: PLASTIC SURGERY | Facility: CLINIC | Age: 34
End: 2024-03-25
Payer: COMMERCIAL

## 2024-03-25 VITALS
SYSTOLIC BLOOD PRESSURE: 128 MMHG | WEIGHT: 214 LBS | HEIGHT: 62 IN | BODY MASS INDEX: 39.38 KG/M2 | DIASTOLIC BLOOD PRESSURE: 77 MMHG | HEART RATE: 75 BPM

## 2024-03-25 DIAGNOSIS — C79.89 CHEST WALL RECURRENCE OF BREAST CANCER, LEFT (MULTI): ICD-10-CM

## 2024-03-25 DIAGNOSIS — L53.9 BREAST ERYTHEMA: Primary | ICD-10-CM

## 2024-03-25 DIAGNOSIS — C50.912 CHEST WALL RECURRENCE OF BREAST CANCER, LEFT (MULTI): ICD-10-CM

## 2024-03-25 DIAGNOSIS — Z98.890 S/P BREAST RECONSTRUCTION: ICD-10-CM

## 2024-03-25 PROCEDURE — 3074F SYST BP LT 130 MM HG: CPT | Performed by: PHYSICIAN ASSISTANT

## 2024-03-25 PROCEDURE — 1036F TOBACCO NON-USER: CPT | Performed by: PHYSICIAN ASSISTANT

## 2024-03-25 PROCEDURE — 99024 POSTOP FOLLOW-UP VISIT: CPT | Performed by: PHYSICIAN ASSISTANT

## 2024-03-25 PROCEDURE — 3078F DIAST BP <80 MM HG: CPT | Performed by: PHYSICIAN ASSISTANT

## 2024-03-25 PROCEDURE — 3008F BODY MASS INDEX DOCD: CPT | Performed by: PHYSICIAN ASSISTANT

## 2024-03-25 RX ORDER — SULFAMETHOXAZOLE AND TRIMETHOPRIM 800; 160 MG/1; MG/1
1 TABLET ORAL 2 TIMES DAILY
Qty: 20 TABLET | Refills: 0 | Status: SHIPPED | OUTPATIENT
Start: 2024-03-25 | End: 2024-04-04

## 2024-03-25 NOTE — PROGRESS NOTES
Department of Plastic and Reconstructive Surgery            Post Operative Visit    Date: 03/25/24  Date of Surgery: 2/26/24    Subjective   Kirsty Shrestha is a 34 y.o. female who presents for POV. They are s/p left delayed reconstruction 610cc saline IDEAL implant placement on 2/26/24 with Dr. Barker.       She presents for POV. She endorses continued swelling and tightness of the left breast. She plans to begin chemotherapy at the end of April.     Objective   Vital Signs:   Vitals:    03/25/24 0917   BP: 128/77   Pulse: 75       Gen: interactive and pleasant  Head: NCAT  Eyes: EOMI, PERRLA  Mouth: MMM  Throat: trachea midline  Cor: RRR  Pulm: nonlabored breathing  Abd: s/nt/nd  Neuro: AAOx3  Ext: extremities perfused    Focused exam of the: breast    Left breast with surgical incision that is well healed. There is diffuse tissue edema of the breast, no ballotable fluid collection. There is warmth to the touch and mild erythema of the breast.   There is no evidence of wound dehiscence.     Assessment/Plan     Kirsty Shrestha is a 34 y.o. female who presents for POV. They are s/p left delayed reconstruction 610cc saline IDEAL implant placement on 2/26/24 with Dr. Barker.       She presents today for POV. She has diffuse tissue edema of the left breast. There is warmth and mild erythema of the left breast, she denied systemic symptoms including fevers and chills. We will sed 1 week empiric antibiotic therapy. She is to continue with breast compression with ace wrap or sports bra. We will see her back in 1 week with Dr. Barker.     Plan:   Follow up in 1 week  1 week empiric antibiotic  Continue with breast compression ace wrap or sports bra.     I spent 20 minutes with this patient. Greater than 50% of this time was spent in the counselling and/or coordination of care of this patient.  This note was created using voice recognition software and was not corrected for typographical or  grammatical errors.    Signature: Roslyn Bermudez PA-C  Date: 03/25/24

## 2024-03-27 ENCOUNTER — APPOINTMENT (OUTPATIENT)
Dept: ENDOCRINOLOGY | Facility: CLINIC | Age: 34
End: 2024-03-27
Payer: COMMERCIAL

## 2024-04-01 ENCOUNTER — PHARMACY VISIT (OUTPATIENT)
Dept: PHARMACY | Facility: CLINIC | Age: 34
End: 2024-04-01
Payer: COMMERCIAL

## 2024-04-01 ENCOUNTER — OFFICE VISIT (OUTPATIENT)
Dept: PLASTIC SURGERY | Facility: CLINIC | Age: 34
End: 2024-04-01
Payer: COMMERCIAL

## 2024-04-01 ENCOUNTER — ANCILLARY PROCEDURE (OUTPATIENT)
Dept: ENDOCRINOLOGY | Facility: CLINIC | Age: 34
End: 2024-04-01
Payer: COMMERCIAL

## 2024-04-01 VITALS
HEART RATE: 85 BPM | HEIGHT: 62 IN | WEIGHT: 214 LBS | SYSTOLIC BLOOD PRESSURE: 124 MMHG | BODY MASS INDEX: 39.38 KG/M2 | DIASTOLIC BLOOD PRESSURE: 78 MMHG

## 2024-04-01 DIAGNOSIS — Z01.812 PRE-PROCEDURE LAB EXAM: ICD-10-CM

## 2024-04-01 DIAGNOSIS — N97.9 FEMALE INFERTILITY: ICD-10-CM

## 2024-04-01 DIAGNOSIS — Z98.890 S/P BREAST RECONSTRUCTION: Primary | ICD-10-CM

## 2024-04-01 LAB
ESTRADIOL SERPL-MCNC: 106 PG/ML
HCT VFR BLD AUTO: 37.7 % (ref 36–46)

## 2024-04-01 PROCEDURE — 36415 COLL VENOUS BLD VENIPUNCTURE: CPT

## 2024-04-01 PROCEDURE — 99024 POSTOP FOLLOW-UP VISIT: CPT | Performed by: SURGERY

## 2024-04-01 PROCEDURE — 3074F SYST BP LT 130 MM HG: CPT | Performed by: SURGERY

## 2024-04-01 PROCEDURE — 76857 US EXAM PELVIC LIMITED: CPT

## 2024-04-01 PROCEDURE — 85014 HEMATOCRIT: CPT

## 2024-04-01 PROCEDURE — 3078F DIAST BP <80 MM HG: CPT | Performed by: SURGERY

## 2024-04-01 PROCEDURE — 82670 ASSAY OF TOTAL ESTRADIOL: CPT

## 2024-04-01 PROCEDURE — 3008F BODY MASS INDEX DOCD: CPT | Performed by: SURGERY

## 2024-04-01 RX ORDER — AMOXICILLIN AND CLAVULANATE POTASSIUM 875; 125 MG/1; MG/1
1 TABLET, FILM COATED ORAL 2 TIMES DAILY
Qty: 28 TABLET | Refills: 0 | Status: SHIPPED | OUTPATIENT
Start: 2024-04-01 | End: 2024-04-15

## 2024-04-01 NOTE — PROGRESS NOTES
[facility] NOTE - IVF STIM BASELINE  Patient presents for baseline ultrasound and/or labs.    Treatment protocol: Antagonist / MEN 75 and Letrozole 5mg   Lead in: Random Start   Start date for lead in: N/A  Last estrace/OCP date: N/A  PGT-A/M? No  PGT order scanned into Epic: N/A  Plan to freeze: all oocytes    Boarding pass signed off:  Yes, by Dr. Garcia 3/25/2024    Medically complex on boarding pass:   no    If indicated, is PAT consult complete?  N/A    SPERM:N/A    Additional details: ONCO- Egg Preservation; Patient in clinic today for RN nurse med teach appointment. RN reviewed clinic monitoring appointment work flow. Patient's medication protocol has been reviewed. RN provided patient with pharmacy contact information, IVF calendar, medication administration handouts and QR code for medication videos. RN reviewed each medication verbally and provided step by step demonstration of medication administration. Patient has no further questions at this time.    Julia Nguyen  04/01/2024  7:58 AM    Reviewed and approved by FRANCISCO BATRES on 4/1/24 at 1:18 PM.      [facility] PROVIDER NOTE  Ultrasound and/or labs reviewed at [facility].   Results for orders placed or performed in visit on 04/01/24 (from the past 96 hour(s))   Estradiol   Result Value Ref Range    Estradiol 106 pg/mL   Hematocrit   Result Value Ref Range    Hematocrit 37.7 36.0 - 46.0 %     Mon 4/1 (Day 1)   follitropin beta injection: Inject 250 Units once daily in the evening under the skin   letrozole tablet: Take 5 mg once daily by mouth   menotropins recon soln injection: Inject 75 Units once daily in the evening under the skin    Tue 4/2 (Day 2)   follitropin beta injection: Inject 250 Units once daily in the evening under the skin   letrozole tablet: Take 5 mg once daily by mouth   menotropins recon soln injection: Inject 75 Units once daily in the evening under the skin    Wed 4/3 (Day 3)   follitropin beta injection: Inject 250 Units once daily  in the evening under the skin   letrozole tablet: Take 5 mg once daily by mouth   menotropins recon soln injection: Inject 75 Units once daily in the evening under the skin    RTC in three days for Follicle scan and Estradiol.     Sachi Betancourt  04/01/2024  1:17 PM    RN communicated patient's plan through my chart message, RN called patient and left a voicemail message with her plan as well. RN encouraged patient to call with any questions or concerns.   Julia Nguyen RN 04/01/24 1:48 PM       RN attempted to call patient again, no answer. RN left a voicemail reviewing the patient's plan and asked patient to please call the pharmacy for her medications.   Julia Nguyen RN 04/01/24 3:26 PM

## 2024-04-01 NOTE — PROGRESS NOTES
Department of Plastic and Reconstructive Surgery            Post Operative Visit    Date: 04/01/24  Date of Surgery: 2/26/24    Subjective   Kirsty Shrestha is a 34 y.o. female who presents for POV. They are s/p left delayed reconstruction 610cc saline IDEAL implant placement on 2/26/24.      She presents for POV. She is endorsed continued swelling and tightness of the left breast. She plans to begin chemotherapy at the end of April.     Objective   Vital Signs:   Vitals:    04/01/24 1101   BP: 124/78   Pulse: 85       Gen: interactive and pleasant  Head: NCAT  Eyes: EOMI, PERRLA  Mouth: MMM  Throat: trachea midline  Cor: RRR  Pulm: nonlabored breathing  Abd: s/nt/nd  Neuro: AAOx3  Ext: extremities perfused    Focused exam of the: breast    Left breast with surgical incision that is well healed.  Left breast remains with peau d'orange characteristic, and warm to touch with very mild blanching hyperemia    Assessment/Plan     Kirsty Shrestha is a 34 y.o. female who presents for POV. They are s/p left delayed reconstruction 610cc saline IDEAL implant placement on 2/26/24 with Dr. Barker.       She presents today for POV. She has diffuse tissue edema of the left breast.  She does not have obvious signs and symptoms of infection, she does not have obvious signs and symptoms drainable fluid collection.  She does continue with generalized edema of the breast.  She was previously started on Bactrim, will switch this to Augmentin.    Plan:   Switch to 2 weeks of Augmentin  Follow-up 2 weeks    I spent 20 minutes with this patient. Greater than 50% of this time was spent in the counselling and/or coordination of care of this patient.  This note was created using voice recognition software and was not corrected for typographical or grammatical errors.    Signature: Galileo Barker MD  Date: 04/01/24

## 2024-04-04 ENCOUNTER — APPOINTMENT (OUTPATIENT)
Dept: ENDOCRINOLOGY | Facility: CLINIC | Age: 34
End: 2024-04-04
Payer: COMMERCIAL

## 2024-04-04 ENCOUNTER — ANCILLARY PROCEDURE (OUTPATIENT)
Dept: ENDOCRINOLOGY | Facility: CLINIC | Age: 34
End: 2024-04-04

## 2024-04-04 ENCOUNTER — SPECIALTY PHARMACY (OUTPATIENT)
Dept: PHARMACY | Facility: CLINIC | Age: 34
End: 2024-04-04

## 2024-04-04 ENCOUNTER — PHARMACY VISIT (OUTPATIENT)
Dept: PHARMACY | Facility: CLINIC | Age: 34
End: 2024-04-04
Payer: COMMERCIAL

## 2024-04-04 DIAGNOSIS — N97.9 FEMALE INFERTILITY: ICD-10-CM

## 2024-04-04 LAB — ESTRADIOL SERPL-MCNC: <20 PG/ML

## 2024-04-04 PROCEDURE — RXMED WILLOW AMBULATORY MEDICATION CHARGE

## 2024-04-04 PROCEDURE — 36415 COLL VENOUS BLD VENIPUNCTURE: CPT

## 2024-04-04 PROCEDURE — 76857 US EXAM PELVIC LIMITED: CPT

## 2024-04-04 NOTE — PROGRESS NOTES
CYCLING NOTE    Here for US and/or lab monitoring for IVF Egg Preservation Cycle #1; relevant findings reviewed.  Patient utilizing a random start Antagonist protocol, currently on / MEN 75 and letrozole 5mg   Today is first day back in clinic since starting medications.   Patient stayed for nurse visit. Pain is 0/10  Team will contact patient later today with results and plan.    Julia Nguyen  04/04/2024  8:41 AM    [facility]  Ultrasound and/or labs reviewed at [facility]   Results for orders placed or performed in visit on 04/04/24 (from the past 96 hour(s))   Estradiol   Result Value Ref Range    Estradiol <20 pg/mL     Thu 4/4 (Day 4)   follitropin beta injection: Inject 300 Units once daily in the evening under the skin   letrozole tablet: Take 5 mg once daily by mouth   menotropins recon soln injection: Inject 150 Units once daily in the evening under the skin    Fri 4/5 (Day 5)   follitropin beta injection: Inject 300 Units once daily in the evening under the skin   letrozole tablet: Take 5 mg once daily by mouth   menotropins recon soln injection: Inject 150 Units once daily in the evening under the skin    RTC in two days for Follicle scan and Estradiol.     Sachi Betancourt  04/04/2024  12:58 PM      RN communicated patient's plan through my chart message and phone call. Patient aware pharmacy is closed on the weekend and she needs to have all of her medications. Patient verbalized understanding of her plan.   Julia Nguyen RN 04/04/24 1:13 PM

## 2024-04-06 ENCOUNTER — ANCILLARY PROCEDURE (OUTPATIENT)
Dept: ENDOCRINOLOGY | Facility: CLINIC | Age: 34
End: 2024-04-06

## 2024-04-06 ENCOUNTER — LAB (OUTPATIENT)
Dept: LAB | Facility: LAB | Age: 34
End: 2024-04-06
Payer: COMMERCIAL

## 2024-04-06 DIAGNOSIS — N97.9 FEMALE INFERTILITY: ICD-10-CM

## 2024-04-06 LAB — ESTRADIOL SERPL-MCNC: 27 PG/ML

## 2024-04-06 PROCEDURE — 76857 US EXAM PELVIC LIMITED: CPT

## 2024-04-06 PROCEDURE — 76857 US EXAM PELVIC LIMITED: CPT | Performed by: OBSTETRICS & GYNECOLOGY

## 2024-04-06 NOTE — PROGRESS NOTES
CYCLING NOTE    Here for US and/or lab monitoring for IVF #1- ONCO egg preservation; relevant findings reviewed.  Patient utilizing random start Antagonist protocol, Currently on / [id number]/ Letrozole 5  Patient has completed 5 days of stim and is prescheduled to return on Monday 4/8.   Patient stayed for nurse visit. Pain is 0/10  Team will contact patient later today with results and plan.    Julia Nguyen  04/06/2024  9:21 AM    HUDDLE PROVIDER NOTE  Ultrasound and/or labs reviewed at [facility].   Results for orders placed or performed in visit on 04/04/24 (from the past 96 hour(s))   Estradiol   Result Value Ref Range    Estradiol <20 pg/mL     Sat 4/6 (Day 6)   follitropin beta injection: Inject 300 Units once daily in the evening under the skin   letrozole tablet: Take 5 mg once daily by mouth   menotropins recon soln injection: Inject 150 Units once daily in the evening under the skin    Sun 4/7 (Day 7)   follitropin beta injection: Inject 300 Units once daily in the evening under the skin   letrozole tablet: Take 5 mg once daily by mouth   menotropins recon soln injection: Inject 150 Units once daily in the evening under the skin    RTC in two days for Follicle scan and Estradiol.   Brissa Ferris  04/06/2024  10:07 AM     RN communicated patient's plan through my chart message and phone call. Patient verbalized understanding of her plan and is already scheduled to return to clinic on Monday 4/8.   Julia Nguyen RN 04/06/24 10:28 AM

## 2024-04-08 ENCOUNTER — PHARMACY VISIT (OUTPATIENT)
Dept: PHARMACY | Facility: CLINIC | Age: 34
End: 2024-04-08
Payer: COMMERCIAL

## 2024-04-08 ENCOUNTER — TELEPHONE (OUTPATIENT)
Dept: RADIOLOGY | Facility: CLINIC | Age: 34
End: 2024-04-08

## 2024-04-08 ENCOUNTER — TELEPHONE (OUTPATIENT)
Dept: ENDOCRINOLOGY | Facility: CLINIC | Age: 34
End: 2024-04-08
Payer: COMMERCIAL

## 2024-04-08 ENCOUNTER — SPECIALTY PHARMACY (OUTPATIENT)
Dept: PHARMACY | Facility: CLINIC | Age: 34
End: 2024-04-08

## 2024-04-08 ENCOUNTER — ANCILLARY PROCEDURE (OUTPATIENT)
Dept: ENDOCRINOLOGY | Facility: CLINIC | Age: 34
End: 2024-04-08

## 2024-04-08 DIAGNOSIS — N97.9 FEMALE INFERTILITY: ICD-10-CM

## 2024-04-08 LAB — ESTRADIOL SERPL-MCNC: 55 PG/ML

## 2024-04-08 PROCEDURE — 76857 US EXAM PELVIC LIMITED: CPT

## 2024-04-08 PROCEDURE — RXMED WILLOW AMBULATORY MEDICATION CHARGE

## 2024-04-08 PROCEDURE — 76857 US EXAM PELVIC LIMITED: CPT | Performed by: OBSTETRICS & GYNECOLOGY

## 2024-04-08 PROCEDURE — 36415 COLL VENOUS BLD VENIPUNCTURE: CPT

## 2024-04-08 NOTE — TELEPHONE ENCOUNTER
Attempted to call patient, left detailed voicemail. Encouraged patient to call back before 4pm with any additional questions. Relayed in message that [employer] message was sent relaying medication plan to hopefully provide some clarity of medication plan.   HEIDI GUILLAUME RN 04/08/2024 15:32

## 2024-04-08 NOTE — PROGRESS NOTES
CYCLING NOTE    Here for US and/or lab monitoring; relevant findings reviewed. For IVF #1 - ONCO egg preservation. Patient utilizing random start Antagonist protocol, Currently on / [id number]/ Letrozole 5. She is currently on day 8 of medications.     Note: right ovary was previously removed due to cyst    Patient did not stay for discussion after monitoring,  Team will contact patient later today with results and plan.    HEIDI GUILLAUME  04/08/2024  7:51 AM    HUDDLE PROVIDER NOTE  Ultrasound and/or labs reviewed at [facility].   Results for orders placed or performed in visit on 04/08/24 (from the past 96 hour(s))   Estradiol   Result Value Ref Range    Estradiol 55 pg/mL     Mon 4/8 (Day 8)   follitropin beta injection: Inject 375 Units once daily in the evening under the skin   letrozole tablet: Take 5 mg once daily by mouth   menotropins recon soln injection: Inject 75 Units once daily in the evening under the skin    Tue 4/9 (Day 9)   follitropin beta injection: Inject 375 Units once daily in the evening under the skin   letrozole tablet: Take 5 mg once daily by mouth   menotropins recon soln injection: Inject 75 Units once daily in the evening under the skin    RTC in two days for Follicle scan and Estradiol.   D/w Dr. Vinh Agudelo  04/08/2024  2:11 PM      [employer] message sent to patient relaying plan as listed above.   HEIDI GUILLAUME RN 04/08/2024 14:28

## 2024-04-10 ENCOUNTER — ANCILLARY PROCEDURE (OUTPATIENT)
Dept: ENDOCRINOLOGY | Facility: CLINIC | Age: 34
End: 2024-04-10
Payer: COMMERCIAL

## 2024-04-10 ENCOUNTER — PHARMACY VISIT (OUTPATIENT)
Dept: PHARMACY | Facility: CLINIC | Age: 34
End: 2024-04-10
Payer: COMMERCIAL

## 2024-04-10 ENCOUNTER — SPECIALTY PHARMACY (OUTPATIENT)
Dept: PHARMACY | Facility: CLINIC | Age: 34
End: 2024-04-10

## 2024-04-10 DIAGNOSIS — N97.9 FEMALE INFERTILITY: ICD-10-CM

## 2024-04-10 LAB — ESTRADIOL SERPL-MCNC: 182 PG/ML

## 2024-04-10 PROCEDURE — 82670 ASSAY OF TOTAL ESTRADIOL: CPT

## 2024-04-10 PROCEDURE — RXMED WILLOW AMBULATORY MEDICATION CHARGE

## 2024-04-10 PROCEDURE — 36415 COLL VENOUS BLD VENIPUNCTURE: CPT

## 2024-04-10 PROCEDURE — 76857 US EXAM PELVIC LIMITED: CPT

## 2024-04-10 NOTE — PROGRESS NOTES
CYCLING NOTE    Here for US and/or lab monitoring for IVF #1 Onco Egg Preservation; relevant findings reviewed.  Patient utilizing a random start Antagonist Protocol, currently on /MEN 75/ Letrozole 5   Patient has completed 9 days of stimulation.  Patient did not stay for discussion after monitoring,  Team will contact patient later today with results and plan.    Julia Nguyen  04/10/2024  8:19 AM      [facility] PROVID[facility]  Ultrasound and/or labs reviewed at [facility].   Results for orders placed or performed in visit on 04/10/24 (from the past 96 hour(s))   Estradiol   Result Value Ref Range    Estradiol 182 pg/mL     Wed 4/10 (Day 10)   follitropin beta 900 unit/1.08 mL injection: Inject 375 Units once daily in the evening under the skin   letrozole tablet: Take 5 mg once daily by mouth   menotropins recon soln injection: Inject 75 Units once daily in the evening under the skin    Thu 4/11 (Day 11)   follitropin beta 900 unit/1.08 mL injection: Inject 375 Units once daily in the evening under the skin   letrozole tablet: Take 5 mg once daily by mouth   ganirelix syringe injection: Inject 250 mcg once daily in the morning under the skin   menotropins recon soln injection: Inject 75 Units once daily in the evening under the skin    Fri 4/12 (Day 12)   ganirelix syringe injection: Inject 250 mcg once daily in the morning under the skin    RTC in two days for Follicle scan and Estradiol.   Nicky Haines  04/10/2024  1:27 PM    RN communicated patient's plan through my chart message and phone call. Patient verbalized understanding of her plan.   Julia Nguyen RN 04/10/24 2:48 PM

## 2024-04-12 ENCOUNTER — ANCILLARY PROCEDURE (OUTPATIENT)
Dept: ENDOCRINOLOGY | Facility: CLINIC | Age: 34
End: 2024-04-12

## 2024-04-12 ENCOUNTER — PHARMACY VISIT (OUTPATIENT)
Dept: PHARMACY | Facility: CLINIC | Age: 34
End: 2024-04-12
Payer: COMMERCIAL

## 2024-04-12 ENCOUNTER — SPECIALTY PHARMACY (OUTPATIENT)
Dept: PHARMACY | Facility: CLINIC | Age: 34
End: 2024-04-12

## 2024-04-12 ENCOUNTER — TELEPHONE (OUTPATIENT)
Dept: ENDOCRINOLOGY | Facility: CLINIC | Age: 34
End: 2024-04-12
Payer: COMMERCIAL

## 2024-04-12 DIAGNOSIS — N97.9 FEMALE INFERTILITY: ICD-10-CM

## 2024-04-12 LAB — ESTRADIOL SERPL-MCNC: 420 PG/ML

## 2024-04-12 PROCEDURE — 36415 COLL VENOUS BLD VENIPUNCTURE: CPT

## 2024-04-12 PROCEDURE — 76857 US EXAM PELVIC LIMITED: CPT | Performed by: OBSTETRICS & GYNECOLOGY

## 2024-04-12 PROCEDURE — 76857 US EXAM PELVIC LIMITED: CPT

## 2024-04-12 PROCEDURE — RXMED WILLOW AMBULATORY MEDICATION CHARGE

## 2024-04-12 NOTE — TELEPHONE ENCOUNTER
RN returned patient's phone call. Patient inquiring about her IVF calendar and how much more meds will be needed. RN explained it is based on her response to the medications, she will most likely trigger at some point this week. RN informed patient to get enough medication to make it through the weekend since pharmacy is closed over the weekend. Patient verbalized understanding and will call pharmacy prior to closure.   Julia Nguyen RN 04/12/24 2:26 PM

## 2024-04-14 ENCOUNTER — ANCILLARY PROCEDURE (OUTPATIENT)
Dept: ENDOCRINOLOGY | Facility: CLINIC | Age: 34
End: 2024-04-14
Payer: COMMERCIAL

## 2024-04-14 DIAGNOSIS — N97.9 FEMALE INFERTILITY: ICD-10-CM

## 2024-04-14 LAB
ESTRADIOL SERPL-MCNC: 1250 PG/ML
PROGEST SERPL-MCNC: 2 NG/ML

## 2024-04-14 PROCEDURE — 36415 COLL VENOUS BLD VENIPUNCTURE: CPT

## 2024-04-14 PROCEDURE — 82670 ASSAY OF TOTAL ESTRADIOL: CPT

## 2024-04-14 PROCEDURE — 84144 ASSAY OF PROGESTERONE: CPT

## 2024-04-14 PROCEDURE — 76857 US EXAM PELVIC LIMITED: CPT

## 2024-04-14 NOTE — PROGRESS NOTES
CYCLING NOTE    Here for US and/or lab monitoring; relevant findings reviewed. Patient here to monitor for IVF #1 -- onco egg preservation.   Patient utilizing random start, Antagonist protocol, currently on / MEN 75/ LETROZOLE 5  Patient started Ganirelix on Thursday 4/11. Patient has completed 13 days of stimulation.   Patient's right ovary was removed due to a cyst.    Confirmed patient has trigger shot at home.     Patient stayed for nurse visit. Pain is 0/10  Team will contact patient later today with results and plan.    HEIDI GUILLAUME  04/14/2024  8:48 AM    HUDDLE PROVIDER NOTE - TRIGGER  Ultrasound and/or labs reviewed at [facility]. Patient ready for trigger.   Results for orders placed or performed in visit on 04/12/24 (from the past 96 hour(s))   Estradiol   Result Value Ref Range    Estradiol 420 pg/mL     Sun 4/14 (Day 14)   NovareL 5,000 unit recon soln: Inject 10,000 Units 1 time if needed under the skin   ganirelix syringe injection: Inject 250 mcg once daily in the morning under the skin      Will trigger tonight at 8:30pm for retrieval at 8:30am on 4/16/24 .  and HCG trigger- to take urine pregnancy test in am tomorrow and call if negative.     Taylor Agudelo  04/14/2024  10:22 AM    Placed phone call to patient to review plan as documented above. Left detailed voicemail relaying trigger plan, retrieval day instructions, and will send additional MyChart information for patient reference.   HEIDI GUILLAUME RN 04/14/2024 11:28

## 2024-04-15 ENCOUNTER — OFFICE VISIT (OUTPATIENT)
Dept: PLASTIC SURGERY | Facility: CLINIC | Age: 34
End: 2024-04-15
Payer: COMMERCIAL

## 2024-04-15 VITALS
SYSTOLIC BLOOD PRESSURE: 145 MMHG | HEART RATE: 78 BPM | DIASTOLIC BLOOD PRESSURE: 86 MMHG | HEIGHT: 62 IN | BODY MASS INDEX: 39.38 KG/M2 | WEIGHT: 214 LBS

## 2024-04-15 DIAGNOSIS — Z98.890 S/P BREAST RECONSTRUCTION, BILATERAL: Primary | ICD-10-CM

## 2024-04-15 PROCEDURE — 99024 POSTOP FOLLOW-UP VISIT: CPT | Performed by: SURGERY

## 2024-04-15 PROCEDURE — 3079F DIAST BP 80-89 MM HG: CPT | Performed by: SURGERY

## 2024-04-15 PROCEDURE — 3077F SYST BP >= 140 MM HG: CPT | Performed by: SURGERY

## 2024-04-15 PROCEDURE — 3008F BODY MASS INDEX DOCD: CPT | Performed by: SURGERY

## 2024-04-15 NOTE — PROGRESS NOTES
Department of Plastic and Reconstructive Surgery            Post Operative Visit    Date: 04/15/24  Date of Surgery: 2/26/24    Subjective   Kirsty Shrestha is a 34 y.o. female who presents for POV. They are s/p left delayed reconstruction 610cc saline IDEAL implant placement on 2/26/24.      She presents for POV.  We have been following postoperative edema and hyperemia.  She presents today indicating edema and hyperemia significantly improved.  She has 2 days left of antibiotic    Objective   Vital Signs:   Vitals:    04/15/24 0934   BP: 145/86   Pulse: 78       Gen: interactive and pleasant  Head: NCAT  Eyes: EOMI, PERRLA  Mouth: MMM  Throat: trachea midline  Cor: RRR  Pulm: nonlabored breathing  Abd: s/nt/nd  Neuro: AAOx3  Ext: extremities perfused    Focused exam of the: breast    Left breast with surgical incision that is well healed.  Left breast remains warm however there is no longer noticeable hyperemia, it does appear slightly larger than the right contralateral breast.  .  Peau d'orange appearance has improved    Assessment/Plan     Kirsty Shrestha is a 34 y.o. female who presents for POV. They are s/p left delayed reconstruction 610cc saline IDEAL implant placement on 2/26/24.    She presents today for POV.  She notes improvement of the edema and hyperemia of the breast.    Plan:   Patient  To chemotherapy.  We had a discussion today regarding the likely effects of radiation to perform revision breast reconstruction after radiation, we indicated we would wait till approximately 6 months after radiation to this discussion.  We did discuss that I did mention to her that we likely would not be able to perform previously for epigastric  surgery if needed, our options would be limited to latissimus dorsi pedicled muscle flap for thigh-based  flaps.    I spent 20 minutes with this patient. Greater than 50% of this time was spent in the counselling and/or  coordination of care of this patient.  This note was created using voice recognition software and was not corrected for typographical or grammatical errors.    Signature: Galileo Barker MD  Date: 04/15/24

## 2024-04-16 ENCOUNTER — PREP FOR PROCEDURE (OUTPATIENT)
Dept: ENDOCRINOLOGY | Facility: CLINIC | Age: 34
End: 2024-04-16

## 2024-04-16 ENCOUNTER — HOSPITAL ENCOUNTER (OUTPATIENT)
Dept: ENDOCRINOLOGY | Facility: CLINIC | Age: 34
Discharge: HOME | End: 2024-04-16

## 2024-04-16 ENCOUNTER — ANESTHESIA EVENT (OUTPATIENT)
Dept: ENDOCRINOLOGY | Facility: CLINIC | Age: 34
End: 2024-04-16

## 2024-04-16 ENCOUNTER — ANESTHESIA (OUTPATIENT)
Dept: ENDOCRINOLOGY | Facility: CLINIC | Age: 34
End: 2024-04-16

## 2024-04-16 VITALS
WEIGHT: 218.7 LBS | SYSTOLIC BLOOD PRESSURE: 143 MMHG | DIASTOLIC BLOOD PRESSURE: 93 MMHG | OXYGEN SATURATION: 98 % | BODY MASS INDEX: 38.75 KG/M2 | RESPIRATION RATE: 15 BRPM | TEMPERATURE: 97.5 F | HEART RATE: 96 BPM | HEIGHT: 63 IN

## 2024-04-16 DIAGNOSIS — Z31.83 ENCOUNTER FOR ASSISTED REPRODUCTIVE FERTILITY CYCLE: ICD-10-CM

## 2024-04-16 LAB — PREGNANCY TEST URINE, POC: POSITIVE

## 2024-04-16 PROCEDURE — 3700000002 HC GENERAL ANESTHESIA TIME - EACH INCREMENTAL 1 MINUTE: Performed by: OBSTETRICS & GYNECOLOGY

## 2024-04-16 PROCEDURE — 3700000001 HC GENERAL ANESTHESIA TIME - INITIAL BASE CHARGE: Performed by: OBSTETRICS & GYNECOLOGY

## 2024-04-16 PROCEDURE — 89337 CRYOPRESERVATION OOCYTE(S): CPT | Performed by: OBSTETRICS & GYNECOLOGY

## 2024-04-16 PROCEDURE — 2500000004 HC RX 250 GENERAL PHARMACY W/ HCPCS (ALT 636 FOR OP/ED): Performed by: ANESTHESIOLOGIST ASSISTANT

## 2024-04-16 PROCEDURE — 76948 ECHO GUIDE OVA ASPIRATION: CPT | Performed by: OBSTETRICS & GYNECOLOGY

## 2024-04-16 PROCEDURE — 81025 URINE PREGNANCY TEST: CPT | Performed by: STUDENT IN AN ORGANIZED HEALTH CARE EDUCATION/TRAINING PROGRAM

## 2024-04-16 RX ORDER — KETOROLAC TROMETHAMINE 30 MG/ML
30 INJECTION, SOLUTION INTRAMUSCULAR; INTRAVENOUS ONCE AS NEEDED
Status: DISCONTINUED | OUTPATIENT
Start: 2024-04-16 | End: 2024-04-17 | Stop reason: HOSPADM

## 2024-04-16 RX ORDER — KETOROLAC TROMETHAMINE 30 MG/ML
30 INJECTION, SOLUTION INTRAMUSCULAR; INTRAVENOUS ONCE AS NEEDED
Status: CANCELLED | OUTPATIENT
Start: 2024-04-16 | End: 2024-04-21

## 2024-04-16 RX ORDER — HYDROCODONE BITARTRATE AND ACETAMINOPHEN 5; 325 MG/1; MG/1
1 TABLET ORAL ONCE AS NEEDED
Status: CANCELLED | OUTPATIENT
Start: 2024-04-16

## 2024-04-16 RX ORDER — ACETAMINOPHEN 325 MG/1
650 TABLET ORAL ONCE AS NEEDED
Status: CANCELLED | OUTPATIENT
Start: 2024-04-16

## 2024-04-16 RX ORDER — ONDANSETRON HYDROCHLORIDE 2 MG/ML
4 INJECTION, SOLUTION INTRAVENOUS AS NEEDED
Status: CANCELLED | OUTPATIENT
Start: 2024-04-16

## 2024-04-16 RX ORDER — SODIUM CHLORIDE, SODIUM LACTATE, POTASSIUM CHLORIDE, CALCIUM CHLORIDE 600; 310; 30; 20 MG/100ML; MG/100ML; MG/100ML; MG/100ML
INJECTION, SOLUTION INTRAVENOUS CONTINUOUS PRN
Status: DISCONTINUED | OUTPATIENT
Start: 2024-04-16 | End: 2024-04-16

## 2024-04-16 RX ORDER — PROPOFOL 10 MG/ML
INJECTION, EMULSION INTRAVENOUS AS NEEDED
Status: DISCONTINUED | OUTPATIENT
Start: 2024-04-16 | End: 2024-04-16

## 2024-04-16 RX ORDER — CEFAZOLIN SODIUM 2 G/100ML
2 INJECTION, SOLUTION INTRAVENOUS ONCE
Status: DISCONTINUED | OUTPATIENT
Start: 2024-04-16 | End: 2024-04-17 | Stop reason: HOSPADM

## 2024-04-16 RX ORDER — ONDANSETRON HYDROCHLORIDE 2 MG/ML
INJECTION, SOLUTION INTRAVENOUS AS NEEDED
Status: DISCONTINUED | OUTPATIENT
Start: 2024-04-16 | End: 2024-04-16

## 2024-04-16 RX ORDER — FENTANYL CITRATE 50 UG/ML
INJECTION, SOLUTION INTRAMUSCULAR; INTRAVENOUS AS NEEDED
Status: DISCONTINUED | OUTPATIENT
Start: 2024-04-16 | End: 2024-04-16

## 2024-04-16 RX ORDER — CEFAZOLIN SODIUM 2 G/100ML
2 INJECTION, SOLUTION INTRAVENOUS ONCE
Status: CANCELLED | OUTPATIENT
Start: 2024-04-16 | End: 2024-04-16

## 2024-04-16 RX ORDER — MORPHINE SULFATE 2 MG/ML
2 INJECTION, SOLUTION INTRAMUSCULAR; INTRAVENOUS AS NEEDED
Status: DISCONTINUED | OUTPATIENT
Start: 2024-04-16 | End: 2024-04-17 | Stop reason: HOSPADM

## 2024-04-16 RX ORDER — ONDANSETRON HYDROCHLORIDE 2 MG/ML
4 INJECTION, SOLUTION INTRAVENOUS AS NEEDED
Status: DISCONTINUED | OUTPATIENT
Start: 2024-04-16 | End: 2024-04-17 | Stop reason: HOSPADM

## 2024-04-16 RX ORDER — OXYCODONE AND ACETAMINOPHEN 5; 325 MG/1; MG/1
1 TABLET ORAL EVERY 6 HOURS PRN
Status: DISCONTINUED | OUTPATIENT
Start: 2024-04-16 | End: 2024-04-17 | Stop reason: HOSPADM

## 2024-04-16 RX ORDER — MIDAZOLAM HYDROCHLORIDE 1 MG/ML
INJECTION, SOLUTION INTRAMUSCULAR; INTRAVENOUS AS NEEDED
Status: DISCONTINUED | OUTPATIENT
Start: 2024-04-16 | End: 2024-04-16

## 2024-04-16 RX ORDER — KETOROLAC TROMETHAMINE 30 MG/ML
30 INJECTION, SOLUTION INTRAMUSCULAR; INTRAVENOUS ONCE
Status: CANCELLED | OUTPATIENT
Start: 2024-04-16 | End: 2024-04-16

## 2024-04-16 RX ORDER — HYDROCODONE BITARTRATE AND ACETAMINOPHEN 5; 325 MG/1; MG/1
1 TABLET ORAL ONCE AS NEEDED
Status: DISCONTINUED | OUTPATIENT
Start: 2024-04-16 | End: 2024-04-17 | Stop reason: HOSPADM

## 2024-04-16 RX ORDER — ACETAMINOPHEN 325 MG/1
650 TABLET ORAL ONCE AS NEEDED
Status: COMPLETED | OUTPATIENT
Start: 2024-04-16 | End: 2024-04-16

## 2024-04-16 RX ORDER — MORPHINE SULFATE 2 MG/ML
2 INJECTION, SOLUTION INTRAMUSCULAR; INTRAVENOUS AS NEEDED
Status: CANCELLED | OUTPATIENT
Start: 2024-04-16

## 2024-04-16 RX ORDER — KETOROLAC TROMETHAMINE 30 MG/ML
30 INJECTION, SOLUTION INTRAMUSCULAR; INTRAVENOUS ONCE
Status: DISCONTINUED | OUTPATIENT
Start: 2024-04-16 | End: 2024-04-17 | Stop reason: HOSPADM

## 2024-04-16 RX ORDER — OXYCODONE AND ACETAMINOPHEN 5; 325 MG/1; MG/1
1 TABLET ORAL EVERY 6 HOURS PRN
Status: CANCELLED | OUTPATIENT
Start: 2024-04-16

## 2024-04-16 RX ORDER — CEFAZOLIN 1 G/1
INJECTION, POWDER, FOR SOLUTION INTRAVENOUS AS NEEDED
Status: DISCONTINUED | OUTPATIENT
Start: 2024-04-16 | End: 2024-04-16

## 2024-04-16 RX ORDER — KETOROLAC TROMETHAMINE 30 MG/ML
INJECTION, SOLUTION INTRAMUSCULAR; INTRAVENOUS AS NEEDED
Status: DISCONTINUED | OUTPATIENT
Start: 2024-04-16 | End: 2024-04-16

## 2024-04-16 RX ADMIN — KETOROLAC TROMETHAMINE 30 MG: 30 INJECTION, SOLUTION INTRAMUSCULAR; INTRAVENOUS at 09:25

## 2024-04-16 RX ADMIN — ONDANSETRON 4 MG: 2 INJECTION INTRAMUSCULAR; INTRAVENOUS at 08:45

## 2024-04-16 RX ADMIN — PROPOFOL 300 MG: 10 INJECTION, EMULSION INTRAVENOUS at 08:55

## 2024-04-16 RX ADMIN — ACETAMINOPHEN 650 MG: 325 TABLET ORAL at 09:45

## 2024-04-16 RX ADMIN — DEXAMETHASONE SODIUM PHOSPHATE 4 MG: 4 INJECTION, SOLUTION INTRA-ARTICULAR; INTRALESIONAL; INTRAMUSCULAR; INTRAVENOUS; SOFT TISSUE at 09:25

## 2024-04-16 RX ADMIN — PROPOFOL 200 MG: 10 INJECTION, EMULSION INTRAVENOUS at 08:47

## 2024-04-16 RX ADMIN — SODIUM CHLORIDE, POTASSIUM CHLORIDE, SODIUM LACTATE AND CALCIUM CHLORIDE: 600; 310; 30; 20 INJECTION, SOLUTION INTRAVENOUS at 08:45

## 2024-04-16 RX ADMIN — MIDAZOLAM 2 MG: 1 INJECTION INTRAMUSCULAR; INTRAVENOUS at 08:46

## 2024-04-16 RX ADMIN — PROPOFOL 300 MG: 10 INJECTION, EMULSION INTRAVENOUS at 09:02

## 2024-04-16 RX ADMIN — CEFAZOLIN 2 G: 330 INJECTION, POWDER, FOR SOLUTION INTRAMUSCULAR; INTRAVENOUS at 08:52

## 2024-04-16 RX ADMIN — FENTANYL CITRATE 100 MCG: 50 INJECTION, SOLUTION INTRAMUSCULAR; INTRAVENOUS at 08:46

## 2024-04-16 ASSESSMENT — PAIN SCALES - GENERAL
PAINLEVEL_OUTOF10: 1
PAINLEVEL_OUTOF10: 5 - MODERATE PAIN
PAINLEVEL_OUTOF10: 7
PAINLEVEL_OUTOF10: 1
PAINLEVEL_OUTOF10: 6

## 2024-04-16 ASSESSMENT — PAIN - FUNCTIONAL ASSESSMENT
PAIN_FUNCTIONAL_ASSESSMENT: 0-10

## 2024-04-16 ASSESSMENT — PAIN DESCRIPTION - DESCRIPTORS
DESCRIPTORS: CRAMPING

## 2024-04-16 NOTE — ANESTHESIA PREPROCEDURE EVALUATION
Patient: Kirsty Shrestha    Procedure Information       Date/Time: 24 0830    Scheduled providers: Nicky Haines MD; Arturo Hills MD; TOM Cloud    Procedure: EGG RETRIEVAL    [facility]            Relevant Problems   Cardiac   (+) Breast pain   (+) Hypertension complicating pregnancy (HHS-HCC)      Pulmonary   (+) CHRISTA (obstructive sleep apnea)      Neuro   (+) Anxiety   (+) Carpal tunnel syndrome   (+) Chronic lumbar radiculopathy   (+) Depression      GI   (+) Esophageal reflux   (+) Gastroesophageal reflux disease      Endocrine   (+) Obesity      Musculoskeletal   (+) Carpal tunnel syndrome      ID   (+) Bacterial vaginosis   (+) Hidradenitis suppurativa of left axilla   (+) Infection due to Chlamydia species   (+) Onychomycosis of toenail   (+) Tinea pedis of both feet      GYN   (+) Chest wall recurrence of breast cancer, left (Multi)   (+) Malignant neoplasm of breast (Multi)   (+) Malignant neoplasm of overlapping sites of left breast in female, estrogen receptor positive (Multi)       Clinical information reviewed:   Tobacco  Allergies  Meds   Med Hx  Surg Hx   Fam Hx  Soc Hx         Past Medical History:   Diagnosis Date    Breast cancer (Multi)     Left, recurrent    History of gestational diabetes     CHRISTA (obstructive sleep apnea)     PONV (postoperative nausea and vomiting)       Past Surgical History:   Procedure Laterality Date    BI US GUIDED BREAST LOCALIZATION AND BIOPSY LEFT Left 2023    BI US GUIDED BREAST LOCALIZATION AND BIOPSY LEFT 2023 Briana Bauer MD [facility]    BREAST LUMPECTOMY Left 2024    With left SLNB    BREAST LUMPECTOMY Left 2024    Re-excision for positive margins    BREAST RECONSTRUCTION  2023    Second stage, abdominoplasty    BREAST RECONSTRUCTION  2024    Left Revision    CARPAL TUNNEL RELEASE Bilateral      SECTION, LOW TRANSVERSE      LAPAROTOMY  "SALPINGO OOPHORECTOMY Right 11/08/2022    MASTECTOMY Bilateral 07/14/2022    Left SLNB, immediate reconstruction    OVARIAN CYST SURGERY  10/21/2020     Social History     Tobacco Use    Smoking status: Never     Passive exposure: Never    Smokeless tobacco: Never   Substance Use Topics    Alcohol use: Yes     Comment: Socially    Drug use: Never      Current Outpatient Medications   Medication Instructions    fexofenadine-pseudoephedrine (Allegra-D 24) 180-240 mg 24 hr tablet 1 tablet, oral, Daily, Do not crush, chew, or split.    levonorgestrel (Mirena) 21 mcg/24 hours (8 yrs) 52 mg IUD 1 each, intrauterine, Daily RT      No Known Allergies     Chemistry    Lab Results   Component Value Date/Time     10/17/2023 1048    K 4.2 10/17/2023 1048     10/17/2023 1048    CO2 26 10/17/2023 1048    BUN 9 10/17/2023 1048    CREATININE 0.70 10/17/2023 1048    Lab Results   Component Value Date/Time    CALCIUM 9.8 10/17/2023 1048    ALKPHOS 88 10/17/2023 1048    AST 14 10/17/2023 1048    ALT 15 10/17/2023 1048    BILITOT 0.5 10/17/2023 1048          Lab Results   Component Value Date    HGBA1C 5.1 10/17/2023     Lab Results   Component Value Date/Time    WBC 8.7 10/17/2023 1048    HGB 13.9 10/17/2023 1048    HCT 37.7 04/01/2024 0652     10/17/2023 1048     Lab Results   Component Value Date/Time    PROTIME 11.8 05/19/2023 1340    INR 1.0 05/19/2023 1340     No results found for: \"ABORH\"  No results found for this or any previous visit (from the past 4464 hour(s)).  No results found for this or any previous visit from the past 1095 days.       Visit Vitals  /84   Pulse 84   Temp 36.5 °C (97.7 °F) (Oral)   Resp 16   Ht 1.6 m (5' 3\")   Wt 99.2 kg (218 lb 11.1 oz)   SpO2 99%   BMI 38.74 kg/m²   OB Status Having periods   Smoking Status Never   BSA 2.1 m²     NPO/Void Status  Date of Last Liquid: 04/15/24  Time of Last Liquid: 2200  Date of Last Solid: 04/15/24  Time of Last Solid: 2200         Physical " Exam    Airway  Mallampati: III  TM distance: >3 FB  Neck ROM: full     Cardiovascular   Rhythm: regular  Rate: normal     Dental - normal exam     Pulmonary   Breath sounds clear to auscultation     Abdominal - normal exam       Other findings: 11/2022: mac 3, grade 1           Anesthesia Plan    History of general anesthesia?: yes  History of complications of general anesthesia?: no    ASA 2     general   (TIVA)  intravenous induction   Anesthetic plan and risks discussed with patient.    Plan discussed with CAA and CRNA.

## 2024-04-16 NOTE — ANESTHESIA POSTPROCEDURE EVALUATION
Patient: Kirsty Shrestha    Procedure Summary       Date: 04/16/24 [facility]    Anesthesia Start: 0845 Anesthesia Stop: 0936    Procedure: EGG RETRIEVAL Diagnosis: Encounter for assisted reproductive fertility cycle    Scheduled Providers: Nicky Haines MD; Arturo Hills MD; TOM Cloud Responsible Provider: [facility] MD    Anesthesia Type: general ASA Status: 2            Anesthesia Type: general    Vitals Value Taken Time   /93 04/16/24 1015   Temp 36.4 °C (97.5 °F) 04/16/24 0930   Pulse 96 04/16/24 1015   Resp 15 04/16/24 1015   SpO2 98 % 04/16/24 1015       Anesthesia Post Evaluation    Patient location during evaluation: PACU  Patient participation: complete - patient participated  Level of consciousness: awake and alert  Pain management: adequate  Airway patency: patent  Cardiovascular status: acceptable and hemodynamically stable  Respiratory status: acceptable, spontaneous ventilation and nonlabored ventilation  Hydration status: acceptable  Postoperative Nausea and Vomiting: none        There were no known notable events for this encounter.

## 2024-04-16 NOTE — DISCHARGE SUMMARY
Patient discharged to home in stable condition via wheelchair to [facility]: Partner's car. Ambulated to wheelchair with no complications, instructed to call office if unable to void within 6 hours of discharge. Discharge instructions given and concerns addressed.

## 2024-04-16 NOTE — DISCHARGE INSTRUCTIONS
[facility]  [address]. [place]  [address]   Tel: [phone]   Fax: [phone]    Home Going Instructions after Egg Retrieval:     Activity:   We do not recommend exercise on the day of or the day after your egg retrieval.   You can resume normal activities in 2-3 days, but please avoid exercise that involves jumping or bouncing.  If you are not having a fresh embryo transfer, you will likely start your period within 1-2 weeks and can resume all normal exercise at that time.   You may resume intercourse one week after your retrieval.     Anesthesia:  Drink small amounts of liquids initially and then slowly increase your intake of food on the day of your procedure. Drinking fluids will keep your bowels regular.   Avoid foods that are sweet, spicy or hard to digest today.  If you feel nauseated, rest your stomach for one hour, and then try drinking clear liquids again.  You may take a stool softener or miralax/milk of magnesia to help with constipation that may occur after anesthesia.  Please make sure a responsible adult is with you for at least 24 hours after surgery and do not drive or make important decisions during this time. Anesthesia may affect your judgment, coordination, and reaction time.    Follow up:  ALWAYS follow up with your primary nurse a few days after your procedure to check in and make sure you know what your next steps are.     When to call your provider:  Vaginal bleeding that is more than a normal period that doesn't taper down within 6 hours.  Saturating a sanitary pad in 1-2 hours.  Any clots the size of an egg or bigger.  Fever of 100.4 or higher with chills.  Unusual or foul smelling discharge.  Discomfort from abdominal distension.     Notify your Physician's office if you develop any signs of Ovarian Hyperstimulation (OHSS):    -Abdominal bloating   -Rapid weight gain of 5-10 lbs. in 1-2 days  -Swelling in  hands and feet  -Severe abdominal pain  -Shortness of breath   -Difficulty urinating or decreased urinary output   -Diarrhea    -Persistent nausea and vomiting  -Dizziness     These signs and symptoms can occur for up to 2 weeks following your oocyte retrieval. Call [phone] to speak with a Physician or Nurse if you have any concerns.    Alexandra Davis    7:29 AM

## 2024-04-16 NOTE — PROGRESS NOTES
Patient ID: Kirsty Shrestha is a 34 y.o. female.    Egg Retrieval    Date/Time: 4/16/2024 9:24 AM    Performed by: Nicky Li MD  Authorized by: VANCE Santana    Consent:     Consent obtained:  Verbal and written    Consent given by:  Patient    Procedure risks and benefits discussed: yes      Patient questions answered: yes      Patient agrees, verbalizes understanding, and wants to proceed: yes      Educational handouts given: yes      Instructions and paperwork completed: yes    Procedure:     Anesthesia:  MAC    Pelvic exam performed: Yes      Cervix cleaned and prepped: Yes      Speculum placed in vagina: Yes      Tenaculum applied to cervix: No      Ultrasound guidance: Yes      Left ovary:  Follicle present    Right ovary:  Right ovary not present    Pt. post-ovulatory: No      Speculum type: Grave and Annemarie      Retrieval method: transvaginal      Needle inserted: Yes      Ovaries aspirated: Yes      Free fluid in pelvis: Yes      Estimated blood loss:  Minimal  Post-procedure:     Patient tolerated procedure well: yes    Comments:      Preop diagnosis: Female infertility   Post op diagnosis: Same  Assistant: None    IV Fluids: 500  cc  EBL: 5  cc  UOP: Not recorded  -approximately 100 ml drained   Specimen: Oocytes  Complications: None    Number of Oocytes right ovary: N/A  Ovarian accèss (right):  n/a ovary absent   Number of Oocytes left ovary: 8  Ovarian access (left): Difficult-ovary very deep and behind enlarged fibroid uterus  -Accessed ovary by pushing down from top of fundus (above umbilicus) and placing tenaculum anterior lip of cervix   Endometrial thickness: Not visualized,very large fibroid uterus  Needle type: Double  Additional notes:     NICKY LI on 4/16/24 at 9:28 AM.

## 2024-04-16 NOTE — ANESTHESIA PROCEDURE NOTES
Airway  Date/Time: 4/16/2024 9:02 AM    Staffing  Performed: TOM   Authorized by: Arturo Hills MD    Performed by: TOM Cloud  Patient location during procedure: OR    Final Airway Details  Final airway type: mask                
70

## 2024-04-17 ENCOUNTER — OFFICE VISIT (OUTPATIENT)
Dept: PRIMARY CARE | Facility: CLINIC | Age: 34
End: 2024-04-17
Payer: COMMERCIAL

## 2024-04-17 VITALS
DIASTOLIC BLOOD PRESSURE: 70 MMHG | HEART RATE: 76 BPM | SYSTOLIC BLOOD PRESSURE: 120 MMHG | HEIGHT: 63 IN | BODY MASS INDEX: 39.48 KG/M2 | RESPIRATION RATE: 18 BRPM | WEIGHT: 222.8 LBS

## 2024-04-17 DIAGNOSIS — Z17.0 MALIGNANT NEOPLASM OF OVERLAPPING SITES OF LEFT BREAST IN FEMALE, ESTROGEN RECEPTOR POSITIVE (MULTI): ICD-10-CM

## 2024-04-17 DIAGNOSIS — F32.89 OTHER DEPRESSION: ICD-10-CM

## 2024-04-17 DIAGNOSIS — C50.812 MALIGNANT NEOPLASM OF OVERLAPPING SITES OF LEFT BREAST IN FEMALE, ESTROGEN RECEPTOR POSITIVE (MULTI): ICD-10-CM

## 2024-04-17 DIAGNOSIS — Z00.00 HEALTHCARE MAINTENANCE: Primary | ICD-10-CM

## 2024-04-17 PROBLEM — Z98.890 HISTORY OF BREAST RECONSTRUCTION: Status: ACTIVE | Noted: 2024-04-17

## 2024-04-17 PROBLEM — L53.9 ERYTHEMA OF BREAST: Status: ACTIVE | Noted: 2024-04-17

## 2024-04-17 PROBLEM — N97.9 FEMALE INFERTILITY: Status: ACTIVE | Noted: 2024-04-01

## 2024-04-17 PROBLEM — G47.33 OSA (OBSTRUCTIVE SLEEP APNEA): Status: RESOLVED | Noted: 2023-10-12 | Resolved: 2024-04-17

## 2024-04-17 PROCEDURE — 1036F TOBACCO NON-USER: CPT | Performed by: INTERNAL MEDICINE

## 2024-04-17 PROCEDURE — 3008F BODY MASS INDEX DOCD: CPT | Performed by: INTERNAL MEDICINE

## 2024-04-17 PROCEDURE — 3074F SYST BP LT 130 MM HG: CPT | Performed by: INTERNAL MEDICINE

## 2024-04-17 PROCEDURE — 99395 PREV VISIT EST AGE 18-39: CPT | Performed by: INTERNAL MEDICINE

## 2024-04-17 PROCEDURE — 3078F DIAST BP <80 MM HG: CPT | Performed by: INTERNAL MEDICINE

## 2024-04-17 ASSESSMENT — ENCOUNTER SYMPTOMS
WHEEZING: 0
RHINORRHEA: 0
JOINT SWELLING: 0
CHILLS: 0
ARTHRALGIAS: 0
SHORTNESS OF BREATH: 0
HEMATURIA: 0
LOSS OF SENSATION IN FEET: 0
LIGHT-HEADEDNESS: 0
NAUSEA: 0
FEVER: 0
DEPRESSION: 0
SORE THROAT: 0
DIFFICULTY URINATING: 0
WEAKNESS: 0
BLOOD IN STOOL: 0
MYALGIAS: 0
CONSTIPATION: 0
DYSURIA: 0
ABDOMINAL PAIN: 0
NUMBNESS: 0
PALPITATIONS: 0
FATIGUE: 0
BACK PAIN: 0
NECK STIFFNESS: 0
DIZZINESS: 0
COUGH: 0
OCCASIONAL FEELINGS OF UNSTEADINESS: 0
NECK PAIN: 0
DIARRHEA: 0
ABDOMINAL DISTENTION: 0
FREQUENCY: 0
VOMITING: 0
HEADACHES: 0
DIAPHORESIS: 0
APPETITE CHANGE: 0
SINUS PRESSURE: 0

## 2024-04-17 ASSESSMENT — PATIENT HEALTH QUESTIONNAIRE - PHQ9
1. LITTLE INTEREST OR PLEASURE IN DOING THINGS: NOT AT ALL
2. FEELING DOWN, DEPRESSED OR HOPELESS: NOT AT ALL
SUM OF ALL RESPONSES TO PHQ9 QUESTIONS 1 AND 2: 0

## 2024-04-17 NOTE — PROGRESS NOTES
"Subjective   Patient ID: Kirsty Shrestha is a 34 y.o. female who presents for Annual Exam.    HPI   She present to establish care.    Review of Systems   Constitutional:  Negative for appetite change, chills, diaphoresis, fatigue and fever.   HENT:  Negative for congestion, ear discharge, ear pain, hearing loss, postnasal drip, rhinorrhea, sinus pressure, sore throat and tinnitus.    Eyes:  Negative for visual disturbance.   Respiratory:  Negative for cough, shortness of breath and wheezing.    Cardiovascular:  Negative for chest pain, palpitations and leg swelling.   Gastrointestinal:  Negative for abdominal distention, abdominal pain, blood in stool, constipation, diarrhea, nausea and vomiting.   Genitourinary:  Negative for decreased urine volume, difficulty urinating, dysuria, frequency, hematuria and urgency.   Musculoskeletal:  Negative for arthralgias, back pain, gait problem, joint swelling, myalgias, neck pain and neck stiffness.   Skin:  Negative for rash.   Neurological:  Negative for dizziness, weakness, light-headedness, numbness and headaches.       Objective   /70   Pulse 76   Resp 18   Ht 1.6 m (5' 3\")   Wt 101 kg (222 lb 12.8 oz)   BMI 39.47 kg/m²     Physical Exam  Vitals reviewed.   Constitutional:       Appearance: Normal appearance. She is normal weight.   HENT:      Right Ear: Tympanic membrane and external ear normal.      Left Ear: Tympanic membrane and external ear normal.   Eyes:      Extraocular Movements: Extraocular movements intact.      Conjunctiva/sclera: Conjunctivae normal.      Pupils: Pupils are equal, round, and reactive to light.   Cardiovascular:      Rate and Rhythm: Normal rate and regular rhythm.      Pulses: Normal pulses.   Pulmonary:      Effort: Pulmonary effort is normal.      Breath sounds: Normal breath sounds.   Abdominal:      General: Bowel sounds are normal.      Palpations: Abdomen is soft.   Musculoskeletal:         General: Normal range of motion. "      Cervical back: Normal range of motion.   Skin:     General: Skin is warm and dry.   Neurological:      General: No focal deficit present.      Mental Status: She is oriented to person, place, and time.   Psychiatric:         Mood and Affect: Mood normal.         Behavior: Behavior normal.         Assessment/Plan   Problem List Items Addressed This Visit             ICD-10-CM    Depression F32.A     Stable  No current kurt for treatment          Malignant neoplasm of overlapping sites of left breast in female, estrogen receptor positive (Multi) C50.812, Z17.0     Keep follow-up with oncology          Healthcare maintenance - Primary Z00.00    Relevant Orders    CBC and Auto Differential    Cholesterol, LDL Direct    Comprehensive Metabolic Panel    Hemoglobin A1C    Lipid Panel Non-Fasting    TSH with reflex to Free T4 if abnormal   RTC in 6-12 mo

## 2024-04-19 ENCOUNTER — APPOINTMENT (OUTPATIENT)
Dept: HEMATOLOGY/ONCOLOGY | Facility: HOSPITAL | Age: 34
End: 2024-04-19
Payer: COMMERCIAL

## 2024-04-22 ENCOUNTER — TELEPHONE (OUTPATIENT)
Dept: HEMATOLOGY/ONCOLOGY | Facility: HOSPITAL | Age: 34
End: 2024-04-22
Payer: COMMERCIAL

## 2024-04-22 ENCOUNTER — APPOINTMENT (OUTPATIENT)
Dept: HEMATOLOGY/ONCOLOGY | Facility: HOSPITAL | Age: 34
End: 2024-04-22
Payer: COMMERCIAL

## 2024-04-22 DIAGNOSIS — C50.919 MALIGNANT NEOPLASM OF FEMALE BREAST, UNSPECIFIED ESTROGEN RECEPTOR STATUS, UNSPECIFIED LATERALITY, UNSPECIFIED SITE OF BREAST (MULTI): Primary | ICD-10-CM

## 2024-04-23 ENCOUNTER — OFFICE VISIT (OUTPATIENT)
Dept: HEMATOLOGY/ONCOLOGY | Facility: HOSPITAL | Age: 34
End: 2024-04-23
Payer: COMMERCIAL

## 2024-04-23 ENCOUNTER — LAB (OUTPATIENT)
Dept: LAB | Facility: LAB | Age: 34
End: 2024-04-23
Payer: COMMERCIAL

## 2024-04-23 ENCOUNTER — INFUSION (OUTPATIENT)
Dept: HEMATOLOGY/ONCOLOGY | Facility: HOSPITAL | Age: 34
End: 2024-04-23
Payer: COMMERCIAL

## 2024-04-23 VITALS
HEART RATE: 77 BPM | HEIGHT: 63 IN | DIASTOLIC BLOOD PRESSURE: 85 MMHG | TEMPERATURE: 97.3 F | BODY MASS INDEX: 38.55 KG/M2 | SYSTOLIC BLOOD PRESSURE: 136 MMHG | WEIGHT: 217.59 LBS | RESPIRATION RATE: 18 BRPM

## 2024-04-23 DIAGNOSIS — C50.819 MALIGNANT NEOPLASM OF OVERLAPPING SITES OF BREAST IN FEMALE, ESTROGEN RECEPTOR POSITIVE, UNSPECIFIED LATERALITY (MULTI): Primary | ICD-10-CM

## 2024-04-23 DIAGNOSIS — Z17.0 MALIGNANT NEOPLASM OF OVERLAPPING SITES OF LEFT BREAST IN FEMALE, ESTROGEN RECEPTOR POSITIVE (MULTI): ICD-10-CM

## 2024-04-23 DIAGNOSIS — Z17.0 MALIGNANT NEOPLASM OF LEFT BREAST IN FEMALE, ESTROGEN RECEPTOR POSITIVE, UNSPECIFIED SITE OF BREAST (MULTI): ICD-10-CM

## 2024-04-23 DIAGNOSIS — C50.912 MALIGNANT NEOPLASM OF LEFT BREAST IN FEMALE, ESTROGEN RECEPTOR POSITIVE, UNSPECIFIED SITE OF BREAST (MULTI): ICD-10-CM

## 2024-04-23 DIAGNOSIS — C50.812 MALIGNANT NEOPLASM OF OVERLAPPING SITES OF LEFT BREAST IN FEMALE, ESTROGEN RECEPTOR POSITIVE (MULTI): ICD-10-CM

## 2024-04-23 DIAGNOSIS — Z17.0 MALIGNANT NEOPLASM OF OVERLAPPING SITES OF BREAST IN FEMALE, ESTROGEN RECEPTOR POSITIVE, UNSPECIFIED LATERALITY (MULTI): Primary | ICD-10-CM

## 2024-04-23 DIAGNOSIS — C50.812 MALIGNANT NEOPLASM OF OVERLAPPING SITES OF LEFT BREAST IN FEMALE, ESTROGEN RECEPTOR POSITIVE (MULTI): Primary | ICD-10-CM

## 2024-04-23 DIAGNOSIS — Z17.0 MALIGNANT NEOPLASM OF OVERLAPPING SITES OF LEFT BREAST IN FEMALE, ESTROGEN RECEPTOR POSITIVE (MULTI): Primary | ICD-10-CM

## 2024-04-23 DIAGNOSIS — C50.919 MALIGNANT NEOPLASM OF FEMALE BREAST, UNSPECIFIED ESTROGEN RECEPTOR STATUS, UNSPECIFIED LATERALITY, UNSPECIFIED SITE OF BREAST (MULTI): ICD-10-CM

## 2024-04-23 LAB
ALBUMIN SERPL BCP-MCNC: 3.8 G/DL (ref 3.4–5)
ALBUMIN SERPL BCP-MCNC: 4.1 G/DL (ref 3.4–5)
ALP SERPL-CCNC: 73 U/L (ref 33–110)
ALP SERPL-CCNC: 80 U/L (ref 33–110)
ALT SERPL W P-5'-P-CCNC: 14 U/L (ref 7–45)
ALT SERPL W P-5'-P-CCNC: 14 U/L (ref 7–45)
ANION GAP SERPL CALC-SCNC: 12 MMOL/L (ref 10–20)
ANION GAP SERPL CALC-SCNC: 13 MMOL/L (ref 10–20)
AST SERPL W P-5'-P-CCNC: 11 U/L (ref 9–39)
AST SERPL W P-5'-P-CCNC: 14 U/L (ref 9–39)
B-HCG SERPL-ACNC: 4 MIU/ML
BASOPHILS # BLD AUTO: 0.03 X10*3/UL (ref 0–0.1)
BASOPHILS NFR BLD AUTO: 0.4 %
BILIRUB SERPL-MCNC: 0.3 MG/DL (ref 0–1.2)
BILIRUB SERPL-MCNC: 0.4 MG/DL (ref 0–1.2)
BUN SERPL-MCNC: 11 MG/DL (ref 6–23)
BUN SERPL-MCNC: 11 MG/DL (ref 6–23)
CALCIUM SERPL-MCNC: 9 MG/DL (ref 8.6–10.3)
CALCIUM SERPL-MCNC: 9.3 MG/DL (ref 8.6–10.6)
CHLORIDE SERPL-SCNC: 107 MMOL/L (ref 98–107)
CHLORIDE SERPL-SCNC: 107 MMOL/L (ref 98–107)
CO2 SERPL-SCNC: 21 MMOL/L (ref 21–32)
CO2 SERPL-SCNC: 24 MMOL/L (ref 21–32)
CREAT SERPL-MCNC: 0.7 MG/DL (ref 0.5–1.05)
CREAT SERPL-MCNC: 0.73 MG/DL (ref 0.5–1.05)
EGFRCR SERPLBLD CKD-EPI 2021: >90 ML/MIN/1.73M*2
EGFRCR SERPLBLD CKD-EPI 2021: >90 ML/MIN/1.73M*2
EOSINOPHIL # BLD AUTO: 0.22 X10*3/UL (ref 0–0.7)
EOSINOPHIL NFR BLD AUTO: 2.9 %
ERYTHROCYTE [DISTWIDTH] IN BLOOD BY AUTOMATED COUNT: 13.2 % (ref 11.5–14.5)
GLUCOSE SERPL-MCNC: 111 MG/DL (ref 74–99)
GLUCOSE SERPL-MCNC: 92 MG/DL (ref 74–99)
HCT VFR BLD AUTO: 38.7 % (ref 36–46)
HGB BLD-MCNC: 13 G/DL (ref 12–16)
IMM GRANULOCYTES # BLD AUTO: 0.03 X10*3/UL (ref 0–0.7)
IMM GRANULOCYTES NFR BLD AUTO: 0.4 % (ref 0–0.9)
LYMPHOCYTES # BLD AUTO: 2.43 X10*3/UL (ref 1.2–4.8)
LYMPHOCYTES NFR BLD AUTO: 31.7 %
MCH RBC QN AUTO: 28.9 PG (ref 26–34)
MCHC RBC AUTO-ENTMCNC: 33.6 G/DL (ref 32–36)
MCV RBC AUTO: 86 FL (ref 80–100)
MONOCYTES # BLD AUTO: 0.31 X10*3/UL (ref 0.1–1)
MONOCYTES NFR BLD AUTO: 4 %
NEUTROPHILS # BLD AUTO: 4.64 X10*3/UL (ref 1.2–7.7)
NEUTROPHILS NFR BLD AUTO: 60.6 %
NRBC BLD-RTO: 0 /100 WBCS (ref 0–0)
PLATELET # BLD AUTO: 399 X10*3/UL (ref 150–450)
POTASSIUM SERPL-SCNC: 4 MMOL/L (ref 3.5–5.3)
POTASSIUM SERPL-SCNC: 4.4 MMOL/L (ref 3.5–5.3)
PROT SERPL-MCNC: 6.9 G/DL (ref 6.4–8.2)
PROT SERPL-MCNC: 7.2 G/DL (ref 6.4–8.2)
RBC # BLD AUTO: 4.5 X10*6/UL (ref 4–5.2)
SODIUM SERPL-SCNC: 136 MMOL/L (ref 136–145)
SODIUM SERPL-SCNC: 140 MMOL/L (ref 136–145)
WBC # BLD AUTO: 7.7 X10*3/UL (ref 4.4–11.3)

## 2024-04-23 PROCEDURE — 2500000004 HC RX 250 GENERAL PHARMACY W/ HCPCS (ALT 636 FOR OP/ED): Performed by: INTERNAL MEDICINE

## 2024-04-23 PROCEDURE — 80053 COMPREHEN METABOLIC PANEL: CPT

## 2024-04-23 PROCEDURE — 96375 TX/PRO/DX INJ NEW DRUG ADDON: CPT | Mod: INF

## 2024-04-23 PROCEDURE — 99215 OFFICE O/P EST HI 40 MIN: CPT | Performed by: INTERNAL MEDICINE

## 2024-04-23 PROCEDURE — 3075F SYST BP GE 130 - 139MM HG: CPT | Performed by: INTERNAL MEDICINE

## 2024-04-23 PROCEDURE — 3008F BODY MASS INDEX DOCD: CPT | Performed by: INTERNAL MEDICINE

## 2024-04-23 PROCEDURE — 36415 COLL VENOUS BLD VENIPUNCTURE: CPT

## 2024-04-23 PROCEDURE — 96413 CHEMO IV INFUSION 1 HR: CPT

## 2024-04-23 PROCEDURE — 85025 COMPLETE CBC W/AUTO DIFF WBC: CPT

## 2024-04-23 PROCEDURE — 2500000001 HC RX 250 WO HCPCS SELF ADMINISTERED DRUGS (ALT 637 FOR MEDICARE OP): Performed by: INTERNAL MEDICINE

## 2024-04-23 PROCEDURE — 3079F DIAST BP 80-89 MM HG: CPT | Performed by: INTERNAL MEDICINE

## 2024-04-23 PROCEDURE — 84702 CHORIONIC GONADOTROPIN TEST: CPT

## 2024-04-23 PROCEDURE — 96361 HYDRATE IV INFUSION ADD-ON: CPT | Mod: INF

## 2024-04-23 RX ORDER — DIPHENHYDRAMINE HCL 25 MG
25 TABLET ORAL ONCE
Status: CANCELLED | OUTPATIENT
Start: 2024-04-23

## 2024-04-23 RX ORDER — HEPARIN SODIUM,PORCINE/PF 10 UNIT/ML
50 SYRINGE (ML) INTRAVENOUS AS NEEDED
Status: CANCELLED | OUTPATIENT
Start: 2024-04-23

## 2024-04-23 RX ORDER — PROCHLORPERAZINE EDISYLATE 5 MG/ML
10 INJECTION INTRAMUSCULAR; INTRAVENOUS EVERY 6 HOURS PRN
Status: CANCELLED | OUTPATIENT
Start: 2024-04-23

## 2024-04-23 RX ORDER — DIPHENHYDRAMINE HYDROCHLORIDE 50 MG/ML
50 INJECTION INTRAMUSCULAR; INTRAVENOUS AS NEEDED
Status: COMPLETED | OUTPATIENT
Start: 2024-04-23 | End: 2024-04-23

## 2024-04-23 RX ORDER — DIPHENHYDRAMINE HYDROCHLORIDE 50 MG/ML
50 INJECTION INTRAMUSCULAR; INTRAVENOUS AS NEEDED
Status: CANCELLED | OUTPATIENT
Start: 2024-05-14

## 2024-04-23 RX ORDER — DEXAMETHASONE 4 MG/1
8 TABLET ORAL 2 TIMES DAILY
Qty: 6 TABLET | Refills: 3 | Status: SHIPPED | OUTPATIENT
Start: 2024-04-23 | End: 2024-05-14 | Stop reason: SDUPTHER

## 2024-04-23 RX ORDER — PALONOSETRON 0.05 MG/ML
0.25 INJECTION, SOLUTION INTRAVENOUS ONCE
Status: CANCELLED | OUTPATIENT
Start: 2024-04-23

## 2024-04-23 RX ORDER — PALONOSETRON 0.05 MG/ML
0.25 INJECTION, SOLUTION INTRAVENOUS ONCE
Status: COMPLETED | OUTPATIENT
Start: 2024-04-23 | End: 2024-04-23

## 2024-04-23 RX ORDER — PALONOSETRON 0.05 MG/ML
0.25 INJECTION, SOLUTION INTRAVENOUS ONCE
Status: CANCELLED | OUTPATIENT
Start: 2024-05-14

## 2024-04-23 RX ORDER — PROCHLORPERAZINE MALEATE 10 MG
10 TABLET ORAL EVERY 6 HOURS PRN
Status: DISCONTINUED | OUTPATIENT
Start: 2024-04-23 | End: 2024-04-23 | Stop reason: HOSPADM

## 2024-04-23 RX ORDER — FAMOTIDINE 10 MG/ML
20 INJECTION INTRAVENOUS ONCE AS NEEDED
Status: COMPLETED | OUTPATIENT
Start: 2024-04-23 | End: 2024-04-23

## 2024-04-23 RX ORDER — DIPHENHYDRAMINE HYDROCHLORIDE 50 MG/ML
50 INJECTION INTRAMUSCULAR; INTRAVENOUS AS NEEDED
Status: CANCELLED | OUTPATIENT
Start: 2024-04-23

## 2024-04-23 RX ORDER — DIPHENHYDRAMINE HCL 25 MG
25 CAPSULE ORAL ONCE
Status: COMPLETED | OUTPATIENT
Start: 2024-04-23 | End: 2024-04-23

## 2024-04-23 RX ORDER — EPINEPHRINE 0.3 MG/.3ML
0.3 INJECTION SUBCUTANEOUS EVERY 5 MIN PRN
Status: CANCELLED | OUTPATIENT
Start: 2024-05-14

## 2024-04-23 RX ORDER — EPINEPHRINE 0.3 MG/.3ML
0.3 INJECTION SUBCUTANEOUS EVERY 5 MIN PRN
Status: DISCONTINUED | OUTPATIENT
Start: 2024-04-23 | End: 2024-04-23 | Stop reason: HOSPADM

## 2024-04-23 RX ORDER — FAMOTIDINE 10 MG/ML
20 INJECTION INTRAVENOUS ONCE AS NEEDED
Status: CANCELLED | OUTPATIENT
Start: 2024-04-23

## 2024-04-23 RX ORDER — FAMOTIDINE 10 MG/ML
20 INJECTION INTRAVENOUS ONCE AS NEEDED
Status: CANCELLED | OUTPATIENT
Start: 2024-04-24

## 2024-04-23 RX ORDER — ALBUTEROL SULFATE 0.83 MG/ML
3 SOLUTION RESPIRATORY (INHALATION) AS NEEDED
Status: CANCELLED | OUTPATIENT
Start: 2024-04-23

## 2024-04-23 RX ORDER — DIPHENHYDRAMINE HYDROCHLORIDE 50 MG/ML
50 INJECTION INTRAMUSCULAR; INTRAVENOUS AS NEEDED
Status: CANCELLED | OUTPATIENT
Start: 2024-04-24

## 2024-04-23 RX ORDER — DIPHENHYDRAMINE HCL 25 MG
25 TABLET ORAL ONCE
Status: CANCELLED | OUTPATIENT
Start: 2024-05-14

## 2024-04-23 RX ORDER — ALBUTEROL SULFATE 0.83 MG/ML
3 SOLUTION RESPIRATORY (INHALATION) AS NEEDED
Status: CANCELLED | OUTPATIENT
Start: 2024-04-24

## 2024-04-23 RX ORDER — EPINEPHRINE 0.3 MG/.3ML
0.3 INJECTION SUBCUTANEOUS EVERY 5 MIN PRN
Status: CANCELLED | OUTPATIENT
Start: 2024-04-24

## 2024-04-23 RX ORDER — PROCHLORPERAZINE EDISYLATE 5 MG/ML
10 INJECTION INTRAMUSCULAR; INTRAVENOUS EVERY 6 HOURS PRN
Status: CANCELLED | OUTPATIENT
Start: 2024-05-14

## 2024-04-23 RX ORDER — HEPARIN 100 UNIT/ML
500 SYRINGE INTRAVENOUS AS NEEDED
Status: CANCELLED | OUTPATIENT
Start: 2024-04-23

## 2024-04-23 RX ORDER — FAMOTIDINE 10 MG/ML
20 INJECTION INTRAVENOUS ONCE AS NEEDED
Status: CANCELLED | OUTPATIENT
Start: 2024-05-14

## 2024-04-23 RX ORDER — EPINEPHRINE 0.3 MG/.3ML
0.3 INJECTION SUBCUTANEOUS EVERY 5 MIN PRN
Status: CANCELLED | OUTPATIENT
Start: 2024-04-23

## 2024-04-23 RX ORDER — ALBUTEROL SULFATE 0.83 MG/ML
3 SOLUTION RESPIRATORY (INHALATION) AS NEEDED
Status: CANCELLED | OUTPATIENT
Start: 2024-05-14

## 2024-04-23 RX ORDER — PROCHLORPERAZINE MALEATE 10 MG
10 TABLET ORAL EVERY 6 HOURS PRN
Status: CANCELLED | OUTPATIENT
Start: 2024-04-23

## 2024-04-23 RX ORDER — ALBUTEROL SULFATE 0.83 MG/ML
3 SOLUTION RESPIRATORY (INHALATION) AS NEEDED
Status: DISCONTINUED | OUTPATIENT
Start: 2024-04-23 | End: 2024-04-23 | Stop reason: HOSPADM

## 2024-04-23 RX ORDER — PROCHLORPERAZINE MALEATE 10 MG
10 TABLET ORAL EVERY 6 HOURS PRN
Status: CANCELLED | OUTPATIENT
Start: 2024-05-14

## 2024-04-23 RX ORDER — PROCHLORPERAZINE EDISYLATE 5 MG/ML
10 INJECTION INTRAMUSCULAR; INTRAVENOUS EVERY 6 HOURS PRN
Status: DISCONTINUED | OUTPATIENT
Start: 2024-04-23 | End: 2024-04-23 | Stop reason: HOSPADM

## 2024-04-23 RX ADMIN — DOCETAXEL ANHYDROUS 160 MG: 10 INJECTION, SOLUTION INTRAVENOUS at 15:41

## 2024-04-23 RX ADMIN — DEXAMETHASONE SODIUM PHOSPHATE 12 MG: 10 INJECTION, SOLUTION INTRAMUSCULAR; INTRAVENOUS at 14:40

## 2024-04-23 RX ADMIN — DIPHENHYDRAMINE HYDROCHLORIDE 50 MG: 50 INJECTION, SOLUTION INTRAMUSCULAR; INTRAVENOUS at 15:49

## 2024-04-23 RX ADMIN — SODIUM CHLORIDE 500 ML: 9 INJECTION, SOLUTION INTRAVENOUS at 14:15

## 2024-04-23 RX ADMIN — DIPHENHYDRAMINE HYDROCHLORIDE 25 MG: 25 CAPSULE ORAL at 14:36

## 2024-04-23 RX ADMIN — FAMOTIDINE 20 MG: 10 INJECTION INTRAVENOUS at 15:51

## 2024-04-23 RX ADMIN — METHYLPREDNISOLONE SODIUM SUCCINATE 40 MG: 40 INJECTION, POWDER, FOR SOLUTION INTRAMUSCULAR; INTRAVENOUS at 15:50

## 2024-04-23 RX ADMIN — PALONOSETRON HYDROCHLORIDE 250 MCG: 0.25 INJECTION INTRAVENOUS at 14:36

## 2024-04-23 ASSESSMENT — PAIN SCALES - GENERAL: PAINLEVEL: 0-NO PAIN

## 2024-04-23 NOTE — SIGNIFICANT EVENT
04/23/24 1408   Prechemo Checklist   Has the patient been in the hospital, ED, or urgent care since last date of service No   Chemo/Immuno Consent Signed Yes   Protocol/Indications Verified Yes   Confirmed to previous date/time of medication Yes   Compared to previous dose Yes   All medications are dated accurately Yes   Pregnancy Test Negative Yes   Parameters Met Yes   BSA/Weight-Height Verified Yes   Dose Calculations Verified (current, total, cumulative) Yes

## 2024-04-23 NOTE — PROGRESS NOTES
Adverse Event Note     Name:Kirsty Shrestha  : 1990  MRN: [id number]      Adverse Event:  Medication: Taxotere  Administered Date/Time:   Reactions/Symptoms Started Time:    Symptoms: (check all that apply)   [] Back Pain   [x] Erythema Face     [] Hypotension     [] Rash/Rigors [x] Other - diaphoresis   [] Bleeding    [] Erythema Hands  [] Itching  [] Swelling/Edema [x] Nausea   [] Chest Pain [] Hives/Urticaria     [] Low platelet Ct  [] Syncope   [] Cytopenia  [] Hypertension       [] Neutropenia      Severity: Moderate   Provider Notified: Yes   Medications Given(Add all medications to the chart via , or treatment plan)   [] Acetaminophen-Tylenol       [x] Famotidine-Pepcid  [] Nitroglycerine-NTG   [] Albuterol                               [] Hydrocortisone       [] Ondansetron-Zofran   [x] Diphenhydramine-Benadryl  [] Lorazepam-Ativan  [] Naloxone-Narcan   [] Epinephrine-Epi                     [x] Methylprednisone   Additional Details/ Comments:   1553 HR 81, 98% RA, 174/103, RR 28  1555 HR 80, 100% RA, 154/89, RR 20    1549: fluids initiated        1622: infused restarted at half rate (133mL/hr) per MD   1633: pt reports tingling feet and mouth watering - infusion paused and MD notified
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
## 2024-04-23 NOTE — PROGRESS NOTES
Pt present for C1D1.  Pt saw provider prior to treatment.  Pt repots no new or worsening sx to this RN.  MD notified of adverse reaction as documented in note. Decision made to reschedule C1 for 3 weeks when pt sees  again.  Pt updated on plan and discharged to home in stable condition with family.

## 2024-04-24 RX ORDER — ONDANSETRON HYDROCHLORIDE 8 MG/1
8 TABLET, FILM COATED ORAL EVERY 8 HOURS PRN
Qty: 20 TABLET | Refills: 3 | Status: SHIPPED | OUTPATIENT
Start: 2024-04-24 | End: 2024-05-24

## 2024-04-24 RX ORDER — PROCHLORPERAZINE MALEATE 10 MG
10 TABLET ORAL EVERY 6 HOURS PRN
Qty: 30 TABLET | Refills: 1 | Status: SHIPPED | OUTPATIENT
Start: 2024-04-24 | End: 2024-05-24

## 2024-04-30 ENCOUNTER — APPOINTMENT (OUTPATIENT)
Dept: HEMATOLOGY/ONCOLOGY | Facility: HOSPITAL | Age: 34
End: 2024-04-30
Payer: COMMERCIAL

## 2024-05-03 ENCOUNTER — OFFICE VISIT (OUTPATIENT)
Dept: HEMATOLOGY/ONCOLOGY | Facility: HOSPITAL | Age: 34
End: 2024-05-03
Payer: COMMERCIAL

## 2024-05-03 VITALS
OXYGEN SATURATION: 99 % | BODY MASS INDEX: 37.81 KG/M2 | WEIGHT: 213.41 LBS | DIASTOLIC BLOOD PRESSURE: 75 MMHG | HEART RATE: 64 BPM | RESPIRATION RATE: 18 BRPM | SYSTOLIC BLOOD PRESSURE: 134 MMHG | TEMPERATURE: 97.3 F

## 2024-05-03 DIAGNOSIS — Z91.89 AT RISK FOR INFERTILITY: ICD-10-CM

## 2024-05-03 DIAGNOSIS — Z31.62 ENCOUNTER FOR FERTILITY PRESERVATION COUNSELING: ICD-10-CM

## 2024-05-03 DIAGNOSIS — C50.912 MALIGNANT NEOPLASM OF LEFT BREAST IN FEMALE, ESTROGEN RECEPTOR POSITIVE, UNSPECIFIED SITE OF BREAST (MULTI): ICD-10-CM

## 2024-05-03 DIAGNOSIS — Z17.0 MALIGNANT NEOPLASM OF LEFT BREAST IN FEMALE, ESTROGEN RECEPTOR POSITIVE, UNSPECIFIED SITE OF BREAST (MULTI): ICD-10-CM

## 2024-05-03 PROCEDURE — 3075F SYST BP GE 130 - 139MM HG: CPT | Performed by: NURSE PRACTITIONER

## 2024-05-03 PROCEDURE — 99215 OFFICE O/P EST HI 40 MIN: CPT | Performed by: NURSE PRACTITIONER

## 2024-05-03 PROCEDURE — 3078F DIAST BP <80 MM HG: CPT | Performed by: NURSE PRACTITIONER

## 2024-05-03 PROCEDURE — 99417 PROLNG OP E/M EACH 15 MIN: CPT | Performed by: NURSE PRACTITIONER

## 2024-05-03 PROCEDURE — 3008F BODY MASS INDEX DOCD: CPT | Performed by: NURSE PRACTITIONER

## 2024-05-03 PROCEDURE — 1036F TOBACCO NON-USER: CPT | Performed by: NURSE PRACTITIONER

## 2024-05-03 ASSESSMENT — ENCOUNTER SYMPTOMS
CONFUSION: 0
FEVER: 0
CHILLS: 0
NERVOUS/ANXIOUS: 0
DEPRESSION: 0
FATIGUE: 0

## 2024-05-03 ASSESSMENT — PAIN SCALES - GENERAL: PAINLEVEL: 0-NO PAIN

## 2024-05-03 NOTE — PROGRESS NOTES
Patient ID: Kirsty Shrestha is a 34 y.o. female.  Referring Physician: Kari Zhao, APRN-CNP  [address] [place] [address]  Primary Care Provider: Caesar Hu MD  Oncologic History:  4/8/22: Dx MG/US performed. A 1.3 x 0.6 x 0.7 cm hypoechoic mass seen in left breast (11:00, 13 cm FN). An additional 0.9 cm mass at 2:00 (8 cm FN) was also seen, possibly benign. ALN’s appeared unremarkable.  4/20/22: Left breast mass biopsy (11:00) showed IDC, grade 3. ER >95%, TX >95%, Her2-negative (2+ IHC; MAHNAZ ratio 1.9, copy #3.6). Mammaprint high-risk (-0.615), luminal B type. Maximum diameter 0.7 cm.  4/28/22: Biopsy of 2:00 lesion benign. Left ALN negative.  Genetic Testing completed. Negative  5/23/2023 bilateral stage 2 breast recosntruction with removal of TE and placement of new IDEA: saline implants 610cc with autologous fat grafting of 160cc per breast and abdominoplasty with rectus plication and umbilical transposition   7/14/22: Bilateral mastectomy with implant reconstruction and left SLNBx performed. No residual carcinoma seen in left breast. Right breast benign. 0/1 left SLN’s involved. pT1bN0 (IA).  08/18/2022: Started on Tamoxifen 20 mg daily by Dr. Connell. Patient did not follow-up with medical oncology after that and stopped Tamoxifen after her prescription run out  12/22/2023: Patient underwent an US-guided left breast biopsy of a cystic mass at 12:00. Pathology was consistent with Invasive ductal carcinoma, grade 3; ER greater than 95%, TX greater than 95%, HER2 negative   1/31/2024: Patient underwent left breast lumpectomy/wide excision of breast cancer and SLNB. Pathology revealed multifocal disease with 4 separate nodules measuring 32, 11, 7 and 4 mm respectively with 0/1 SLNs involved. With invasive cancer present at lateral margin, within microns of posterior margin and <1mm of anterior and medial margins. Repeat receptor of mass at 12:00 showed Estrogen Receptor positive at 95%,  Progesteron receptor at >95% and her2 equivocal and not amplified at 1.1    02/26/2024: Patient underwent re-excision  04/16/2024: oocyte retrieval, 8 mature oocytes cryopreserved  04/23/2024: C1D1 Docetaxel/Cyclophosphamide    Subjective    Ms. Shrestha is a pleasant 33 y.o. premenopausal AA female with prior Stage: IA (pT1b pN0) invasive ductal carcinoma of the left breast; ER >95%, [place] >95%, Her2-negative, Mammaprint: High-risk, luminal B, -0.615. Now with locally recurrent left invasive ductal carcinoma, stage IB (rPT2, pN0 M0).  The patient's recurrent breast cancer was diagnosed on December 22, 2023, and is grade 3, Estrogen Receptor positive at 95%, Progesteron receptor at >95% and her2 equivocal.    Returns today in follow up regarding issues unique to young adults with cancer. Relays oocyte retrieval went very well and she retrieved 8 mature oocytes. Began chemotherapy last week and unfortunately experienced significant docetaxol reaction and was held/delayed with plan to start C1 again in two weeks.     Physically, she noted feeling fatigued after chemotherapy which lasted for several days and resolved on its own. She is otherwise physically feeling well. Eats a well-balanced diet, does not drink alcohol, smoke, vape or use recreational drugs. Does not do any purposeful exercise. Denies concerns with housing, transportation, finances, or access to healthy foods.     Emotionally, she relays good and bad days. Trying to keep a positive outlook and reframe her thinking when feeling down or overwhelmed. Has good emotional support from friends/family. In relationship with partner, hoping they become engaged in the next year and then can consider family building when safe from cancer standpoint. She/daughter have been engaged with TGP classes and patient feels daughter is well supported and she has been able to communicate with her about cancer and her experience.     Is sexually active with male partner, denies  concerns with sexual function. Has mirena IUD in place for pregnancy prevention. Notes need to use barrier method to prevent exposure or abstain for sex for 7 days after chemotherapy.     Social History: Lives in [place] with daughter 12 y.o. Not currently working, previously worked in construction. In a a relationship. Grew up in the area and has family nearby. Does not smoke, vape, or use recreational drugs. Previously drank 5 drinks/week. Now not using ETOH with chemotherapy.     Review of Systems   Constitutional:  Negative for chills, fatigue and fever.   Psychiatric/Behavioral:  Negative for confusion and depression. The patient is not nervous/anxious.       Objective   BSA: 2.07 meters squared  /75 (BP Location: Left arm, Patient Position: Sitting, BP Cuff Size: Large adult)   Pulse 64   Temp 36.3 °C (97.3 °F) (Temporal)   Resp 18   Wt 96.8 kg (213 lb 6.5 oz)   SpO2 99%   BMI 37.81 kg/m²   Performance Status:  Asymptomatic    Current Outpatient Medications   Medication Instructions    dexAMETHasone (DECADRON) 8 mg, oral, 2 times daily, Take 2 tablets 2 times a day x 3 days, starting 24 hours before chemotherapy    fexofenadine-pseudoephedrine (Allegra-D 24) 180-240 mg 24 hr tablet 1 tablet, oral, Daily, Do not crush, chew, or split.    levonorgestrel (Mirena) 21 mcg/24 hours (8 yrs) 52 mg IUD 1 each, intrauterine, Daily RT    ondansetron (ZOFRAN) 8 mg, oral, Every 8 hours PRN    prochlorperazine (COMPAZINE) 10 mg, oral, Every 6 hours PRN       No family history on file.  Oncology History   Malignant neoplasm of breast (Multi)   11/28/2022 Initial Diagnosis    Malignant neoplasm of breast (CMS/HCC)     4/23/2024 -  Chemotherapy    TC (DOCEtaxel / Cyclophosphamide), 21 Day Cycles     Chest wall recurrence of breast cancer, left (Multi)   1/12/2024 Initial Diagnosis    Chest wall recurrence of breast cancer, left (CMS/HCC)     1/31/2024 Cancer Staged    Staging form: Breast, AJCC 8th Edition,  Pathologic stage from 1/31/2024: Stage IB (rpT2, pN0(sn), cM0, G3, ER+, AZ+, HER2-) - Signed by Julia Carrasquillo MD on 2/27/2024       Kirsty Shrestha  reports that she has never smoked. She has never been exposed to tobacco smoke. She has never used smokeless tobacco.  She  reports current alcohol use.  She  reports no history of drug use.    Physical Exam  Constitutional:       General: She is not in acute distress.     Appearance: Normal appearance. She is not ill-appearing.   Neurological:      General: No focal deficit present.      Mental Status: She is alert and oriented to person, place, and time.      Motor: No weakness.      Gait: Gait normal.   Psychiatric:         Mood and Affect: Mood normal.         Behavior: Behavior normal.         Thought Content: Thought content normal.         Judgment: Judgment normal.       No visits with results within 1 Week(s) from this visit.   Latest known visit with results is:   Infusion on 04/23/2024   Component Date Value Ref Range Status    Glucose 04/23/2024 92  74 - 99 mg/dL Final    Sodium 04/23/2024 136  136 - 145 mmol/L Final    Potassium 04/23/2024 4.0  3.5 - 5.3 mmol/L Final    Chloride 04/23/2024 107  98 - 107 mmol/L Final    Bicarbonate 04/23/2024 21  21 - 32 mmol/L Final    Anion Gap 04/23/2024 12  10 - 20 mmol/L Final    Urea Nitrogen 04/23/2024 11  6 - 23 mg/dL Final    Creatinine 04/23/2024 0.73  0.50 - 1.05 mg/dL Final    eGFR 04/23/2024 >90  >60 mL/min/1.73m*2 Final    Calculations of estimated GFR are performed using the 2021 CKD-EPI Study Refit equation without the race variable for the IDMS-Traceable creatinine methods.  https://jasn.asnjournals.org/content/early/2021/09/22/ASN.2091094309    Calcium 04/23/2024 9.0  8.6 - 10.3 mg/dL Final    Albumin 04/23/2024 4.1  3.4 - 5.0 g/dL Final    Alkaline Phosphatase 04/23/2024 73  33 - 110 U/L Final    Total Protein 04/23/2024 7.2  6.4 - 8.2 g/dL Final    AST 04/23/2024 14  9 - 39 U/L Final    Bilirubin, Total  04/23/2024 0.3  0.0 - 1.2 mg/dL Final    ALT 04/23/2024 14  7 - 45 U/L Final    Patients treated with Sulfasalazine may generate falsely decreased results for ALT.        Assessment/Plan    Kirsty Shrestha is a 33 y.o. F with prior Stage: IA (pT1b pN0) invasive ductal carcinoma of the left breast; ER >95%, [place] >95%, Her2-negative, s/p bilateral mastectomies but lost to follow up on oral endocrine therapy. Represented in December with locally recurrent left invasive ductal carcinoma, stage IB (rPT2, pN0 M0) ER/UT+ HER2 equivocal. S/p L lumpectomy with wide excision 01/2024 followed by re-excision 02/2024 d/t positive margins. Currently receiving systemic chemotherapy with docetaxol and cyclophosphamide x 4 cycles, will be followed by chest irradiation, and oral endocrine therapy.     #Psychosocial: young adult and parent with cancer  - Connected to [facility] and engaged in programs  - Receiving young adult social meet up invitations via [employer]  - Provided patient with resource for [employer]  - Connected to [employer] in breast program    #Diet/Exercise/Healthy Lifestyle:  - Discussed research demonstrating consumption of a healthy, plant based diet not only decreases cancer risk, but also has been found to improve survival in cancer patients who partake in a healthy diet.   - We reviewed the American Cancer Society guidelines for a healthy diet including mostly plant based foods  with incorporation of plant/lean animal proteins and healthy fats such as the mediterranean diet.   - Patient provided with a copy of ACS guidelines for healthy diet  - Discussed research that demonstrates decreased cancer risk and improved survival in cancer patients who exercise and stay active throughout diagnosis and treatment.  - Encouraged patient to begin gentle exercise as tolerated with low impact activities such as walking, bicycling, or lifting light weights  - Encouraged patient to continue to abstain from alcohol,  tobacco, and recreational drugs    #Sexual Dysfunction/Contraception:  - We discussed the seriousness of getting pregnant while receiving chemotherapy due to the harmful effects this could have on the  fetus, and on her cancer treatment given the decreased availability of medical  services.   - Currently has mirena IUD in place -- safe form of contraception for breast cancer with hormone sensitivity  - We discussed that small amounts of chemotherapy may be found in her body secretions including vaginal secretions and she should use a barrier form of contraception such as a male or female condom to protect her partner from chemotherapy exposure.  - Discussed potential for experiencing sexual dysfunction -- decreased interest/arousal/orgasm/vaginal dryness or pain with sex -- she was encouraged to reach out if she experiences any concerns regarding intimacy    #Risk for infertility:  - Previously discussed the damaging effect cancer treatment can have on the ovaries.   - Previously discussed natural age related decline in fertility and menopause that occurs as a result of ovarian aging, and that cancer treatments can accellerate that progression and diminish ovarian reserve.  - Individuals may experience varying degrees of short or long term ovarian dysfunction and amenorrhea, and that this is dependent upon type of cancer treatment, cumulative dose, and patients age.   - Loss of ovarian function may be permanent or temporary.  - Amenorrhea may be temporary or permanent -- temporary amenorrhea results from destruction of maturing follicles whereas permanent amenorrhea results from depletion of viable primordial follicles.  - Risk to fertility is INTERMEDIATE based on cancer treatment of Cyclophosphamide at doses ~5000mg  - Baseline AMH testing 24 1.25  - Underwent ovarian stimulation and oocyte retrieval  retrieved 8 mature oocytes -- at [facility] in long term storage  - Discussed the POSITIVE trial  findings and how this may provide us with some guidance, however in her specific scenario with recurrence it is unclear whether a pause from endocrine therapy would be safe given the lack of data in patients with recurrent cancer  - Discussed plan to work with her medical oncology team to determine safe time frame for her to consider family building  - Discussed role of ovarian suppression -- suppresses menstrual bleeding, and may have benefit of protecting ovarian function -- goserelin ordered, will d/w Dr. Carrasquillo plans for administration    #Genetic Risk: young adult with cancer and personal family history of cancer  - Discussed rationale for genetic risk consultation -- will address at follow up and place a referral  - Discussed small percentage ~5% of all cancers attributed to inheritable mutation that predisposes an individual for increased cancer risk, genetic risk consultation will perform family pedigree and blood or tissue typing to analyze patient DNA for any identifiable mutations  - Should he have an identified mutation this would not change his current treatment plan, but may have implications for earlier or more frequent screenings for other types of cancer, additionally siblings and offspring may also be offered genetic screening      Follow up in 2-3 months                PUSHPA Hannon-CNP

## 2024-05-09 ENCOUNTER — APPOINTMENT (OUTPATIENT)
Dept: RADIOLOGY | Facility: HOSPITAL | Age: 34
End: 2024-05-09
Payer: COMMERCIAL

## 2024-05-09 ENCOUNTER — HOSPITAL ENCOUNTER (EMERGENCY)
Facility: HOSPITAL | Age: 34
Discharge: HOME | End: 2024-05-09
Payer: COMMERCIAL

## 2024-05-09 ENCOUNTER — NURSE TRIAGE (OUTPATIENT)
Dept: ADMISSION | Facility: HOSPITAL | Age: 34
End: 2024-05-09
Payer: COMMERCIAL

## 2024-05-09 VITALS
WEIGHT: 213 LBS | HEART RATE: 95 BPM | DIASTOLIC BLOOD PRESSURE: 76 MMHG | TEMPERATURE: 97.9 F | HEIGHT: 62 IN | BODY MASS INDEX: 39.2 KG/M2 | SYSTOLIC BLOOD PRESSURE: 116 MMHG | RESPIRATION RATE: 16 BRPM | OXYGEN SATURATION: 97 %

## 2024-05-09 DIAGNOSIS — G44.89 OTHER HEADACHE SYNDROME: Primary | ICD-10-CM

## 2024-05-09 LAB
ANION GAP SERPL CALC-SCNC: 15 MMOL/L (ref 10–20)
APPEARANCE UR: CLEAR
BASOPHILS # BLD AUTO: 0.05 X10*3/UL (ref 0–0.1)
BASOPHILS NFR BLD AUTO: 0.3 %
BILIRUB UR STRIP.AUTO-MCNC: NEGATIVE MG/DL
BUN SERPL-MCNC: 9 MG/DL (ref 6–23)
CALCIUM SERPL-MCNC: 9.4 MG/DL (ref 8.6–10.6)
CHLORIDE SERPL-SCNC: 102 MMOL/L (ref 98–107)
CO2 SERPL-SCNC: 24 MMOL/L (ref 21–32)
COLOR UR: NORMAL
CREAT SERPL-MCNC: 0.66 MG/DL (ref 0.5–1.05)
EGFRCR SERPLBLD CKD-EPI 2021: >90 ML/MIN/1.73M*2
EOSINOPHIL # BLD AUTO: 0.03 X10*3/UL (ref 0–0.7)
EOSINOPHIL NFR BLD AUTO: 0.2 %
ERYTHROCYTE [DISTWIDTH] IN BLOOD BY AUTOMATED COUNT: 12.6 % (ref 11.5–14.5)
GLUCOSE SERPL-MCNC: 99 MG/DL (ref 74–99)
GLUCOSE UR STRIP.AUTO-MCNC: NORMAL MG/DL
HCT VFR BLD AUTO: 38.9 % (ref 36–46)
HGB BLD-MCNC: 13.8 G/DL (ref 12–16)
IMM GRANULOCYTES # BLD AUTO: 0.23 X10*3/UL (ref 0–0.7)
IMM GRANULOCYTES NFR BLD AUTO: 1.4 % (ref 0–0.9)
KETONES UR STRIP.AUTO-MCNC: NEGATIVE MG/DL
LEUKOCYTE ESTERASE UR QL STRIP.AUTO: NEGATIVE
LYMPHOCYTES # BLD AUTO: 2.19 X10*3/UL (ref 1.2–4.8)
LYMPHOCYTES NFR BLD AUTO: 13.2 %
MCH RBC QN AUTO: 29.2 PG (ref 26–34)
MCHC RBC AUTO-ENTMCNC: 35.5 G/DL (ref 32–36)
MCV RBC AUTO: 82 FL (ref 80–100)
MONOCYTES # BLD AUTO: 1.43 X10*3/UL (ref 0.1–1)
MONOCYTES NFR BLD AUTO: 8.6 %
NEUTROPHILS # BLD AUTO: 12.72 X10*3/UL (ref 1.2–7.7)
NEUTROPHILS NFR BLD AUTO: 76.3 %
NITRITE UR QL STRIP.AUTO: NEGATIVE
NRBC BLD-RTO: 0 /100 WBCS (ref 0–0)
PH UR STRIP.AUTO: 6 [PH]
PLATELET # BLD AUTO: 398 X10*3/UL (ref 150–450)
POTASSIUM SERPL-SCNC: 3.6 MMOL/L (ref 3.5–5.3)
PROT UR STRIP.AUTO-MCNC: NEGATIVE MG/DL
RBC # BLD AUTO: 4.72 X10*6/UL (ref 4–5.2)
RBC # UR STRIP.AUTO: NEGATIVE /UL
SARS-COV-2 RNA RESP QL NAA+PROBE: NOT DETECTED
SODIUM SERPL-SCNC: 137 MMOL/L (ref 136–145)
SP GR UR STRIP.AUTO: 1.01
UROBILINOGEN UR STRIP.AUTO-MCNC: NORMAL MG/DL
WBC # BLD AUTO: 16.7 X10*3/UL (ref 4.4–11.3)

## 2024-05-09 PROCEDURE — 2500000004 HC RX 250 GENERAL PHARMACY W/ HCPCS (ALT 636 FOR OP/ED): Mod: SE | Performed by: PHYSICIAN ASSISTANT

## 2024-05-09 PROCEDURE — 99285 EMERGENCY DEPT VISIT HI MDM: CPT | Performed by: PHYSICIAN ASSISTANT

## 2024-05-09 PROCEDURE — 71046 X-RAY EXAM CHEST 2 VIEWS: CPT | Mod: FOREIGN READ | Performed by: RADIOLOGY

## 2024-05-09 PROCEDURE — 96375 TX/PRO/DX INJ NEW DRUG ADDON: CPT

## 2024-05-09 PROCEDURE — 71046 X-RAY EXAM CHEST 2 VIEWS: CPT

## 2024-05-09 PROCEDURE — 96361 HYDRATE IV INFUSION ADD-ON: CPT

## 2024-05-09 PROCEDURE — 99284 EMERGENCY DEPT VISIT MOD MDM: CPT | Mod: 25

## 2024-05-09 PROCEDURE — 70450 CT HEAD/BRAIN W/O DYE: CPT

## 2024-05-09 PROCEDURE — 70450 CT HEAD/BRAIN W/O DYE: CPT | Performed by: RADIOLOGY

## 2024-05-09 PROCEDURE — 84132 ASSAY OF SERUM POTASSIUM: CPT | Performed by: PHYSICIAN ASSISTANT

## 2024-05-09 PROCEDURE — 96374 THER/PROPH/DIAG INJ IV PUSH: CPT

## 2024-05-09 PROCEDURE — 85025 COMPLETE CBC W/AUTO DIFF WBC: CPT | Performed by: PHYSICIAN ASSISTANT

## 2024-05-09 PROCEDURE — 87635 SARS-COV-2 COVID-19 AMP PRB: CPT | Performed by: PHYSICIAN ASSISTANT

## 2024-05-09 PROCEDURE — 36415 COLL VENOUS BLD VENIPUNCTURE: CPT | Performed by: PHYSICIAN ASSISTANT

## 2024-05-09 PROCEDURE — 81003 URINALYSIS AUTO W/O SCOPE: CPT | Performed by: PHYSICIAN ASSISTANT

## 2024-05-09 RX ORDER — ACETAMINOPHEN 325 MG/1
975 TABLET ORAL ONCE
Status: COMPLETED | OUTPATIENT
Start: 2024-05-09 | End: 2024-05-09

## 2024-05-09 RX ORDER — DIPHENHYDRAMINE HYDROCHLORIDE 50 MG/ML
25 INJECTION INTRAMUSCULAR; INTRAVENOUS ONCE
Status: COMPLETED | OUTPATIENT
Start: 2024-05-09 | End: 2024-05-09

## 2024-05-09 RX ORDER — METOCLOPRAMIDE HYDROCHLORIDE 5 MG/ML
10 INJECTION INTRAMUSCULAR; INTRAVENOUS ONCE
Status: COMPLETED | OUTPATIENT
Start: 2024-05-09 | End: 2024-05-09

## 2024-05-09 RX ADMIN — METOCLOPRAMIDE 10 MG: 5 INJECTION, SOLUTION INTRAMUSCULAR; INTRAVENOUS at 14:17

## 2024-05-09 RX ADMIN — ACETAMINOPHEN 975 MG: 325 TABLET ORAL at 14:17

## 2024-05-09 RX ADMIN — DIPHENHYDRAMINE HYDROCHLORIDE 25 MG: 50 INJECTION, SOLUTION INTRAMUSCULAR; INTRAVENOUS at 14:17

## 2024-05-09 RX ADMIN — SODIUM CHLORIDE 1000 ML: 9 INJECTION, SOLUTION INTRAVENOUS at 14:17

## 2024-05-09 ASSESSMENT — LIFESTYLE VARIABLES
HAVE YOU EVER FELT YOU SHOULD CUT DOWN ON YOUR DRINKING: NO
EVER FELT BAD OR GUILTY ABOUT YOUR DRINKING: NO
EVER HAD A DRINK FIRST THING IN THE MORNING TO STEADY YOUR NERVES TO GET RID OF A HANGOVER: NO
HAVE PEOPLE ANNOYED YOU BY CRITICIZING YOUR DRINKING: NO
TOTAL SCORE: 0

## 2024-05-09 ASSESSMENT — PAIN - FUNCTIONAL ASSESSMENT: PAIN_FUNCTIONAL_ASSESSMENT: 0-10

## 2024-05-09 ASSESSMENT — COLUMBIA-SUICIDE SEVERITY RATING SCALE - C-SSRS
6. HAVE YOU EVER DONE ANYTHING, STARTED TO DO ANYTHING, OR PREPARED TO DO ANYTHING TO END YOUR LIFE?: NO
2. HAVE YOU ACTUALLY HAD ANY THOUGHTS OF KILLING YOURSELF?: NO
1. IN THE PAST MONTH, HAVE YOU WISHED YOU WERE DEAD OR WISHED YOU COULD GO TO SLEEP AND NOT WAKE UP?: NO

## 2024-05-09 NOTE — ED PROVIDER NOTES
HPI   Chief Complaint   Patient presents with    Headache     HA and BP elevated at home.        HPI: Patient is a 34-year-old female with history of malignant neoplasm of the left breast on chemo as of 4/23 who presents to the ED for headache that started this morning.  Patient states that this morning she woke up with fever, chills, body aches and headache.  States that her fever was about 100.8 which resolved on her arrival to the ED.  She denies any abdominal pain, shortness of breath, cough, chest pain, photophobia, neck pain or vision changes.  Localizes her pain to the frontal aspect of the head and rates an 8 out of 10 in severity.  States that she has had headaches before in the past but not like this.  ------------------------------------------------------------------------------------------------------------------------------------------  ROS: a ten point review of systems was performed and was negative except as per HPI.  ------------------------------------------------------------------------------------------------------------------------------------------  PMH / PSH: as per HPI, otherwise reviewed   MEDS: as per HPI, otherwise reviewed in EMR  ALLERGIES: as per HPI, otherwise reviewed in EMR  SocH:  as per HPI, otherwise reviewed in EMR  FH:  as per HPI, otherwise reviewed in EMR   ------------------------------------------------------------------------------------------------------------------------------------------  Physical Exam:  VS: As documented in the triage note and EMR flowsheet from this visit was reviewed  General: Well appearing. No acute distress.   Eyes:  Extraocular movements grossly intact. No scleral icterus.   Head: Atraumatic. Normocephalic.     Neck: No meningismus. No gross masses. Full movement through range of motion  ENT: Posterior oropharynx shows no erythema, exudate or edema.  Uvula is midline without edema.  No stridor or trismus  CV: Regular rhythm. No murmurs, rubs,  gallops appreciated.   Resp: Clear to auscultation bilaterally. No respiratory distress.    GI: Nontender. Soft. No masses. No rebound, rigidity or guarding.   MSK: Symmetric muscle bulk. No gross step offs or deformities.  Skin: Warm, dry. No rashes  Neuro: CN II-VII intact. A&O x3. Speech fluent. Alert. Moving all extremities. Ambulates with normal gait  Psych: Appropriate mood and affect for situation  ------------------------------------------------------------------------------------------------------------------------------------------  Hospital Course / Medical Decision Making: Patient is a 34-year-old female with a history of breast cancer on active chemo presents to the ED for headache and subjective fever that started this morning.  On examination, patient is well-appearing.  Vitals notable for tachycardia at 110, otherwise stable without fever.  No meningeal signs on examination.  Cardiopulmonary examination shows no adventitious breath sounds.  Patient administered IV fluids, acetaminophen, Reglan and Benadryl for pain control.  CBC did show a leukocytosis of 16.7.  BMP without renal or electrolyte abnormality.  COVID screen negative.  Urinalysis without evidence of infection.  CT head showed no acute intracranial abnormalities.  Chest x-ray showed no acute pulmonary pathology.  On reassessment, patient is feeling markedly improved.  States that her headache is down to a 0 out of 10 in severity.  Heart rate is improved to 95.  I spoke to patient's oncologist Dr. Carrasquillo who is okay with the patient being discharged home and following up with the office as scheduled in 5 days.  Patient amenable with this plan. Patient has remained hemodynamically stable throughout the course of their ED stay.  Patient is home-going.  Patient advised to return to the ED for any worsening symptoms.  Advised to follow-up with PCP.  Patient was discharged in stable condition.                              No data recorded                    Patient History   Past Medical History:   Diagnosis Date    Breast cancer (Multi)     Left, recurrent    History of gestational diabetes     CHRISTA (obstructive sleep apnea)     PONV (postoperative nausea and vomiting)      Past Surgical History:   Procedure Laterality Date    BI US GUIDED BREAST LOCALIZATION AND BIOPSY LEFT Left 2023    BI US GUIDED BREAST LOCALIZATION AND BIOPSY LEFT 2023 Briaan Bauer MD [facility]    BREAST LUMPECTOMY Left 2024    With left SLNB    BREAST LUMPECTOMY Left 2024    Re-excision for positive margins    BREAST RECONSTRUCTION  2023    Second stage, abdominoplasty    BREAST RECONSTRUCTION  2024    Left Revision    CARPAL TUNNEL RELEASE Bilateral      SECTION, LOW TRANSVERSE      LAPAROTOMY SALPINGO OOPHORECTOMY Right 2022    MASTECTOMY Bilateral 2022    Left SLNB, immediate reconstruction    OVARIAN CYST SURGERY  10/21/2020     No family history on file.  Social History     Tobacco Use    Smoking status: Never     Passive exposure: Never    Smokeless tobacco: Never   Vaping Use    Vaping status: Not on file   Substance Use Topics    Alcohol use: Yes     Comment: Socially    Drug use: Never       Physical Exam   ED Triage Vitals [24 1328]   Temperature Heart Rate Respirations BP   36.6 °C (97.9 °F) (!) 110 16 135/84      Pulse Ox Temp Source Heart Rate Source Patient Position   97 % Skin Monitor Sitting      BP Location FiO2 (%)     Right arm --       Physical Exam    ED Course & MDM   Diagnoses as of 24 1639   Other headache syndrome       Medical Decision Making      Procedure  Procedures     Julia Mora PA-C  24 [id number]

## 2024-05-09 NOTE — TELEPHONE ENCOUNTER
Kirsty states since she started chemo on 4/23, her BP has been more elevated than normal and she has been experiencing headaches. She states she did react to her infusion that day.   The headaches are not daily, however, are a couple of times per week.     Her BP was 145/100 this morning. This is around where it has been.     She states her head has been throbbing all morning; despite taking 500mg of Tylenol she is still rating the headache an 8/10. States this is the worst headache she has ever had.     She also vomited once last night and once this morning.    She did take her Compazine and this helped the nausea per patient. Nausea has completely subsided.     As we continued speaking, she did tell me that her temperature this morning was 100.6 at 0700.   She also c/o body aches and chills.   She re-took her temperature while on the phone and it is 98.8, however, this is after taking 500mg Tylenol.   No cough, shortness of breath or chest pain.     I advised Kirsty to report to the ED to be evaluated.   Patient verbalized understanding and has no further questions or concerns at this time.  Team notified via secure chat.

## 2024-05-10 LAB — HOLD SPECIMEN: NORMAL

## 2024-05-13 RX ORDER — FAMOTIDINE 20 MG/1
20 TABLET, FILM COATED ORAL ONCE
Status: CANCELLED | OUTPATIENT
Start: 2024-05-14 | End: 2024-05-14

## 2024-05-13 RX ORDER — DIPHENHYDRAMINE HYDROCHLORIDE 50 MG/ML
50 INJECTION INTRAMUSCULAR; INTRAVENOUS ONCE
Status: CANCELLED | OUTPATIENT
Start: 2024-05-14 | End: 2024-05-14

## 2024-05-14 ENCOUNTER — TELEPHONE (OUTPATIENT)
Dept: HEMATOLOGY/ONCOLOGY | Facility: HOSPITAL | Age: 34
End: 2024-05-14

## 2024-05-14 ENCOUNTER — APPOINTMENT (OUTPATIENT)
Dept: HEMATOLOGY/ONCOLOGY | Facility: HOSPITAL | Age: 34
End: 2024-05-14
Payer: COMMERCIAL

## 2024-05-14 ENCOUNTER — INFUSION (OUTPATIENT)
Dept: HEMATOLOGY/ONCOLOGY | Facility: HOSPITAL | Age: 34
End: 2024-05-14
Payer: COMMERCIAL

## 2024-05-14 ENCOUNTER — LAB (OUTPATIENT)
Dept: LAB | Facility: LAB | Age: 34
End: 2024-05-14
Payer: COMMERCIAL

## 2024-05-14 ENCOUNTER — OFFICE VISIT (OUTPATIENT)
Dept: HEMATOLOGY/ONCOLOGY | Facility: HOSPITAL | Age: 34
End: 2024-05-14
Payer: COMMERCIAL

## 2024-05-14 ENCOUNTER — DOCUMENTATION (OUTPATIENT)
Dept: HEMATOLOGY/ONCOLOGY | Facility: CLINIC | Age: 34
End: 2024-05-14

## 2024-05-14 VITALS
HEART RATE: 68 BPM | WEIGHT: 219.58 LBS | RESPIRATION RATE: 18 BRPM | DIASTOLIC BLOOD PRESSURE: 85 MMHG | BODY MASS INDEX: 40.16 KG/M2 | OXYGEN SATURATION: 98 % | SYSTOLIC BLOOD PRESSURE: 154 MMHG | TEMPERATURE: 97.5 F

## 2024-05-14 VITALS
OXYGEN SATURATION: 95 % | TEMPERATURE: 97.9 F | HEART RATE: 74 BPM | BODY MASS INDEX: 41.06 KG/M2 | RESPIRATION RATE: 18 BRPM | HEIGHT: 62 IN | SYSTOLIC BLOOD PRESSURE: 134 MMHG | DIASTOLIC BLOOD PRESSURE: 81 MMHG | WEIGHT: 223.11 LBS

## 2024-05-14 DIAGNOSIS — Z17.0 MALIGNANT NEOPLASM OF OVERLAPPING SITES OF LEFT BREAST IN FEMALE, ESTROGEN RECEPTOR POSITIVE (MULTI): ICD-10-CM

## 2024-05-14 DIAGNOSIS — C50.812 MALIGNANT NEOPLASM OF OVERLAPPING SITES OF LEFT BREAST IN FEMALE, ESTROGEN RECEPTOR POSITIVE (MULTI): ICD-10-CM

## 2024-05-14 DIAGNOSIS — Z17.0 MALIGNANT NEOPLASM OF OVERLAPPING SITES OF LEFT BREAST IN FEMALE, ESTROGEN RECEPTOR POSITIVE (MULTI): Primary | ICD-10-CM

## 2024-05-14 DIAGNOSIS — C50.812 MALIGNANT NEOPLASM OF OVERLAPPING SITES OF LEFT BREAST IN FEMALE, ESTROGEN RECEPTOR POSITIVE (MULTI): Primary | ICD-10-CM

## 2024-05-14 DIAGNOSIS — Z17.0 MALIGNANT NEOPLASM OF OVERLAPPING SITES OF BREAST IN FEMALE, ESTROGEN RECEPTOR POSITIVE, UNSPECIFIED LATERALITY (MULTI): ICD-10-CM

## 2024-05-14 DIAGNOSIS — C50.819 MALIGNANT NEOPLASM OF OVERLAPPING SITES OF BREAST IN FEMALE, ESTROGEN RECEPTOR POSITIVE, UNSPECIFIED LATERALITY (MULTI): ICD-10-CM

## 2024-05-14 LAB
ALBUMIN SERPL BCP-MCNC: 3.8 G/DL (ref 3.4–5)
ALP SERPL-CCNC: 76 U/L (ref 33–110)
ALT SERPL W P-5'-P-CCNC: 22 U/L (ref 7–45)
ANION GAP SERPL CALC-SCNC: 12 MMOL/L (ref 10–20)
AST SERPL W P-5'-P-CCNC: 11 U/L (ref 9–39)
BASOPHILS # BLD AUTO: 0.03 X10*3/UL (ref 0–0.1)
BASOPHILS NFR BLD AUTO: 0.3 %
BILIRUB SERPL-MCNC: 0.3 MG/DL (ref 0–1.2)
BUN SERPL-MCNC: 12 MG/DL (ref 6–23)
CALCIUM SERPL-MCNC: 9.3 MG/DL (ref 8.6–10.3)
CHLORIDE SERPL-SCNC: 104 MMOL/L (ref 98–107)
CO2 SERPL-SCNC: 27 MMOL/L (ref 21–32)
CREAT SERPL-MCNC: 0.69 MG/DL (ref 0.5–1.05)
EGFRCR SERPLBLD CKD-EPI 2021: >90 ML/MIN/1.73M*2
EOSINOPHIL # BLD AUTO: 0.04 X10*3/UL (ref 0–0.7)
EOSINOPHIL NFR BLD AUTO: 0.3 %
ERYTHROCYTE [DISTWIDTH] IN BLOOD BY AUTOMATED COUNT: 13.2 % (ref 11.5–14.5)
GLUCOSE SERPL-MCNC: 90 MG/DL (ref 74–99)
HCT VFR BLD AUTO: 36 % (ref 36–46)
HGB BLD-MCNC: 12.5 G/DL (ref 12–16)
IMM GRANULOCYTES # BLD AUTO: 0.09 X10*3/UL (ref 0–0.7)
IMM GRANULOCYTES NFR BLD AUTO: 0.8 % (ref 0–0.9)
LYMPHOCYTES # BLD AUTO: 3.15 X10*3/UL (ref 1.2–4.8)
LYMPHOCYTES NFR BLD AUTO: 26.7 %
MCH RBC QN AUTO: 29.3 PG (ref 26–34)
MCHC RBC AUTO-ENTMCNC: 34.7 G/DL (ref 32–36)
MCV RBC AUTO: 84 FL (ref 80–100)
MONOCYTES # BLD AUTO: 0.65 X10*3/UL (ref 0.1–1)
MONOCYTES NFR BLD AUTO: 5.5 %
NEUTROPHILS # BLD AUTO: 7.84 X10*3/UL (ref 1.2–7.7)
NEUTROPHILS NFR BLD AUTO: 66.4 %
NRBC BLD-RTO: 0 /100 WBCS (ref 0–0)
PLATELET # BLD AUTO: 452 X10*3/UL (ref 150–450)
POTASSIUM SERPL-SCNC: 3.6 MMOL/L (ref 3.5–5.3)
PROT SERPL-MCNC: 7.2 G/DL (ref 6.4–8.2)
RBC # BLD AUTO: 4.27 X10*6/UL (ref 4–5.2)
SODIUM SERPL-SCNC: 139 MMOL/L (ref 136–145)
WBC # BLD AUTO: 11.8 X10*3/UL (ref 4.4–11.3)

## 2024-05-14 PROCEDURE — 96402 CHEMO HORMON ANTINEOPL SQ/IM: CPT

## 2024-05-14 PROCEDURE — 2500000005 HC RX 250 GENERAL PHARMACY W/O HCPCS: Performed by: INTERNAL MEDICINE

## 2024-05-14 PROCEDURE — 96375 TX/PRO/DX INJ NEW DRUG ADDON: CPT | Mod: INF

## 2024-05-14 PROCEDURE — 80053 COMPREHEN METABOLIC PANEL: CPT

## 2024-05-14 PROCEDURE — 96413 CHEMO IV INFUSION 1 HR: CPT

## 2024-05-14 PROCEDURE — 96415 CHEMO IV INFUSION ADDL HR: CPT

## 2024-05-14 PROCEDURE — 96417 CHEMO IV INFUS EACH ADDL SEQ: CPT

## 2024-05-14 PROCEDURE — 2500000001 HC RX 250 WO HCPCS SELF ADMINISTERED DRUGS (ALT 637 FOR MEDICARE OP): Performed by: INTERNAL MEDICINE

## 2024-05-14 PROCEDURE — 85025 COMPLETE CBC W/AUTO DIFF WBC: CPT

## 2024-05-14 PROCEDURE — 3008F BODY MASS INDEX DOCD: CPT | Performed by: INTERNAL MEDICINE

## 2024-05-14 PROCEDURE — 3079F DIAST BP 80-89 MM HG: CPT | Performed by: INTERNAL MEDICINE

## 2024-05-14 PROCEDURE — 99215 OFFICE O/P EST HI 40 MIN: CPT | Performed by: INTERNAL MEDICINE

## 2024-05-14 PROCEDURE — 2500000004 HC RX 250 GENERAL PHARMACY W/ HCPCS (ALT 636 FOR OP/ED): Mod: JW | Performed by: INTERNAL MEDICINE

## 2024-05-14 PROCEDURE — 2500000004 HC RX 250 GENERAL PHARMACY W/ HCPCS (ALT 636 FOR OP/ED): Performed by: INTERNAL MEDICINE

## 2024-05-14 PROCEDURE — 3075F SYST BP GE 130 - 139MM HG: CPT | Performed by: INTERNAL MEDICINE

## 2024-05-14 RX ORDER — ALBUTEROL SULFATE 0.83 MG/ML
3 SOLUTION RESPIRATORY (INHALATION) AS NEEDED
Status: DISCONTINUED | OUTPATIENT
Start: 2024-05-14 | End: 2024-05-14 | Stop reason: HOSPADM

## 2024-05-14 RX ORDER — DIPHENHYDRAMINE HYDROCHLORIDE 50 MG/ML
50 INJECTION INTRAMUSCULAR; INTRAVENOUS AS NEEDED
Status: CANCELLED | OUTPATIENT
Start: 2024-06-11

## 2024-05-14 RX ORDER — DIPHENHYDRAMINE HYDROCHLORIDE 50 MG/ML
50 INJECTION INTRAMUSCULAR; INTRAVENOUS AS NEEDED
Status: DISCONTINUED | OUTPATIENT
Start: 2024-05-14 | End: 2024-05-14 | Stop reason: HOSPADM

## 2024-05-14 RX ORDER — ALBUTEROL SULFATE 0.83 MG/ML
3 SOLUTION RESPIRATORY (INHALATION) AS NEEDED
Status: CANCELLED | OUTPATIENT
Start: 2024-06-11

## 2024-05-14 RX ORDER — FAMOTIDINE 10 MG/ML
20 INJECTION INTRAVENOUS ONCE AS NEEDED
Status: DISCONTINUED | OUTPATIENT
Start: 2024-05-14 | End: 2024-05-14 | Stop reason: HOSPADM

## 2024-05-14 RX ORDER — FAMOTIDINE 20 MG/1
20 TABLET, FILM COATED ORAL ONCE
Status: COMPLETED | OUTPATIENT
Start: 2024-05-14 | End: 2024-05-14

## 2024-05-14 RX ORDER — PROCHLORPERAZINE EDISYLATE 5 MG/ML
10 INJECTION INTRAMUSCULAR; INTRAVENOUS EVERY 6 HOURS PRN
Status: DISCONTINUED | OUTPATIENT
Start: 2024-05-14 | End: 2024-05-14 | Stop reason: HOSPADM

## 2024-05-14 RX ORDER — LIDOCAINE HYDROCHLORIDE 10 MG/ML
2 INJECTION, SOLUTION EPIDURAL; INFILTRATION; INTRACAUDAL; PERINEURAL ONCE
Status: CANCELLED | OUTPATIENT
Start: 2024-06-11

## 2024-05-14 RX ORDER — FAMOTIDINE 10 MG/ML
20 INJECTION INTRAVENOUS ONCE AS NEEDED
Status: CANCELLED | OUTPATIENT
Start: 2024-06-11

## 2024-05-14 RX ORDER — EPINEPHRINE 0.3 MG/.3ML
0.3 INJECTION SUBCUTANEOUS EVERY 5 MIN PRN
Status: DISCONTINUED | OUTPATIENT
Start: 2024-05-14 | End: 2024-05-14 | Stop reason: HOSPADM

## 2024-05-14 RX ORDER — DIPHENHYDRAMINE HYDROCHLORIDE 50 MG/ML
50 INJECTION INTRAMUSCULAR; INTRAVENOUS ONCE
Status: COMPLETED | OUTPATIENT
Start: 2024-05-14 | End: 2024-05-14

## 2024-05-14 RX ORDER — PROCHLORPERAZINE MALEATE 10 MG
10 TABLET ORAL EVERY 6 HOURS PRN
Status: DISCONTINUED | OUTPATIENT
Start: 2024-05-14 | End: 2024-05-14 | Stop reason: HOSPADM

## 2024-05-14 RX ORDER — LIDOCAINE HYDROCHLORIDE 10 MG/ML
2 INJECTION, SOLUTION EPIDURAL; INFILTRATION; INTRACAUDAL; PERINEURAL ONCE
Status: COMPLETED | OUTPATIENT
Start: 2024-05-14 | End: 2024-05-14

## 2024-05-14 RX ORDER — EPINEPHRINE 0.3 MG/.3ML
0.3 INJECTION SUBCUTANEOUS EVERY 5 MIN PRN
Status: CANCELLED | OUTPATIENT
Start: 2024-06-11

## 2024-05-14 RX ORDER — PALONOSETRON 0.05 MG/ML
0.25 INJECTION, SOLUTION INTRAVENOUS ONCE
Status: COMPLETED | OUTPATIENT
Start: 2024-05-14 | End: 2024-05-14

## 2024-05-14 RX ORDER — DEXAMETHASONE 4 MG/1
8 TABLET ORAL 2 TIMES DAILY
Qty: 6 TABLET | Refills: 3 | Status: SHIPPED | OUTPATIENT
Start: 2024-05-14 | End: 2024-06-13

## 2024-05-14 RX ADMIN — FAMOTIDINE 20 MG: 20 TABLET, FILM COATED ORAL at 09:17

## 2024-05-14 RX ADMIN — DOCETAXEL 155 MG: 10 INJECTION, SOLUTION INTRAVENOUS at 10:25

## 2024-05-14 RX ADMIN — PALONOSETRON HYDROCHLORIDE 250 MCG: 0.25 INJECTION INTRAVENOUS at 09:07

## 2024-05-14 RX ADMIN — CYCLOPHOSPHAMIDE 1236 MG: 1 INJECTION, POWDER, FOR SOLUTION INTRAVENOUS; ORAL at 14:22

## 2024-05-14 RX ADMIN — SODIUM CHLORIDE 500 ML: 9 INJECTION, SOLUTION INTRAVENOUS at 08:38

## 2024-05-14 RX ADMIN — METHYLPREDNISOLONE SODIUM SUCCINATE 40 MG: 40 INJECTION, POWDER, FOR SOLUTION INTRAMUSCULAR; INTRAVENOUS at 09:08

## 2024-05-14 RX ADMIN — LIDOCAINE HYDROCHLORIDE 20 MG: 10 INJECTION, SOLUTION EPIDURAL; INFILTRATION; INTRACAUDAL; PERINEURAL at 13:13

## 2024-05-14 RX ADMIN — DIPHENHYDRAMINE HYDROCHLORIDE 50 MG: 50 INJECTION INTRAMUSCULAR; INTRAVENOUS at 09:17

## 2024-05-14 RX ADMIN — DEXAMETHASONE SODIUM PHOSPHATE 12 MG: 10 INJECTION, SOLUTION INTRAMUSCULAR; INTRAVENOUS at 09:18

## 2024-05-14 RX ADMIN — GOSERELIN ACETATE 3.6 MG: 3.6 IMPLANT SUBCUTANEOUS at 13:13

## 2024-05-14 ASSESSMENT — PAIN SCALES - GENERAL
PAINLEVEL: 0-NO PAIN
PAINLEVEL_OUTOF10: 0-NO PAIN

## 2024-05-14 NOTE — PROGRESS NOTES
Pt arrived to [facility] infusion for scheduled treatment. Pt received scheduled infusion without incident. Pt had a hx of docetaxel reaction. Docetaxel ran over 3 hours at hypersensitivity rates. Hypersensitivity rates do not match the rates listed in the MAR order-set. See RN encounter for correct rates. Pt also received 1st injection of goserelin without incident in right abdomen. Pt discharged from infusion center and saw provider after visit. Pt aware to return tomorrow for growth factor injection. RN provided patient with handout of future appointments.

## 2024-05-14 NOTE — TELEPHONE ENCOUNTER
RN confirmed with PT her infusion appt, May 15th, 2024 at noon for her growth factor. PT verbalized understanding.
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
## 2024-05-15 ENCOUNTER — INFUSION (OUTPATIENT)
Dept: HEMATOLOGY/ONCOLOGY | Facility: HOSPITAL | Age: 34
End: 2024-05-15
Payer: COMMERCIAL

## 2024-05-15 VITALS
HEART RATE: 69 BPM | TEMPERATURE: 98.2 F | DIASTOLIC BLOOD PRESSURE: 84 MMHG | OXYGEN SATURATION: 99 % | WEIGHT: 220.9 LBS | BODY MASS INDEX: 40.39 KG/M2 | SYSTOLIC BLOOD PRESSURE: 135 MMHG | RESPIRATION RATE: 18 BRPM

## 2024-05-15 DIAGNOSIS — Z17.0 MALIGNANT NEOPLASM OF OVERLAPPING SITES OF LEFT BREAST IN FEMALE, ESTROGEN RECEPTOR POSITIVE (MULTI): ICD-10-CM

## 2024-05-15 DIAGNOSIS — C50.812 MALIGNANT NEOPLASM OF OVERLAPPING SITES OF LEFT BREAST IN FEMALE, ESTROGEN RECEPTOR POSITIVE (MULTI): ICD-10-CM

## 2024-05-15 PROCEDURE — 96372 THER/PROPH/DIAG INJ SC/IM: CPT

## 2024-05-15 PROCEDURE — 2500000004 HC RX 250 GENERAL PHARMACY W/ HCPCS (ALT 636 FOR OP/ED): Mod: JZ | Performed by: INTERNAL MEDICINE

## 2024-05-15 RX ADMIN — PEGFILGRASTIM 6 MG: 6 INJECTION SUBCUTANEOUS at 12:14

## 2024-05-15 ASSESSMENT — PAIN SCALES - GENERAL: PAINLEVEL: 5

## 2024-05-15 NOTE — PROGRESS NOTES
Patient was here for neulasta injection. Patient tolerated without issues. Patient knows when to return to the clinic for her next appointment. Patient denies any side effects or complaints.

## 2024-05-21 ENCOUNTER — APPOINTMENT (OUTPATIENT)
Dept: HEMATOLOGY/ONCOLOGY | Facility: HOSPITAL | Age: 34
End: 2024-05-21
Payer: COMMERCIAL

## 2024-06-03 ENCOUNTER — LAB (OUTPATIENT)
Dept: LAB | Facility: LAB | Age: 34
End: 2024-06-03
Payer: COMMERCIAL

## 2024-06-03 DIAGNOSIS — C50.812 MALIGNANT NEOPLASM OF OVERLAPPING SITES OF LEFT BREAST IN FEMALE, ESTROGEN RECEPTOR POSITIVE (MULTI): ICD-10-CM

## 2024-06-03 DIAGNOSIS — Z17.0 MALIGNANT NEOPLASM OF OVERLAPPING SITES OF LEFT BREAST IN FEMALE, ESTROGEN RECEPTOR POSITIVE (MULTI): ICD-10-CM

## 2024-06-03 LAB
ALBUMIN SERPL BCP-MCNC: 4.5 G/DL (ref 3.4–5)
ALP SERPL-CCNC: 99 U/L (ref 33–110)
ALT SERPL W P-5'-P-CCNC: 19 U/L (ref 7–45)
ANION GAP SERPL CALC-SCNC: 13 MMOL/L (ref 10–20)
AST SERPL W P-5'-P-CCNC: 16 U/L (ref 9–39)
BASOPHILS # BLD AUTO: 0.06 X10*3/UL (ref 0–0.1)
BASOPHILS NFR BLD AUTO: 0.3 %
BILIRUB SERPL-MCNC: 0.2 MG/DL (ref 0–1.2)
BUN SERPL-MCNC: 11 MG/DL (ref 6–23)
CALCIUM SERPL-MCNC: 9.6 MG/DL (ref 8.6–10.6)
CHLORIDE SERPL-SCNC: 107 MMOL/L (ref 98–107)
CO2 SERPL-SCNC: 23 MMOL/L (ref 21–32)
CREAT SERPL-MCNC: 0.68 MG/DL (ref 0.5–1.05)
EGFRCR SERPLBLD CKD-EPI 2021: >90 ML/MIN/1.73M*2
EOSINOPHIL # BLD AUTO: 0 X10*3/UL (ref 0–0.7)
EOSINOPHIL NFR BLD AUTO: 0 %
ERYTHROCYTE [DISTWIDTH] IN BLOOD BY AUTOMATED COUNT: 13.7 % (ref 11.5–14.5)
GLUCOSE SERPL-MCNC: 109 MG/DL (ref 74–99)
HCT VFR BLD AUTO: 40.5 % (ref 36–46)
HGB BLD-MCNC: 13.3 G/DL (ref 12–16)
IMM GRANULOCYTES # BLD AUTO: 0.65 X10*3/UL (ref 0–0.7)
IMM GRANULOCYTES NFR BLD AUTO: 2.8 % (ref 0–0.9)
LYMPHOCYTES # BLD AUTO: 1.56 X10*3/UL (ref 1.2–4.8)
LYMPHOCYTES NFR BLD AUTO: 6.7 %
MCH RBC QN AUTO: 28.6 PG (ref 26–34)
MCHC RBC AUTO-ENTMCNC: 32.8 G/DL (ref 32–36)
MCV RBC AUTO: 87 FL (ref 80–100)
MONOCYTES # BLD AUTO: 0.63 X10*3/UL (ref 0.1–1)
MONOCYTES NFR BLD AUTO: 2.7 %
NEUTROPHILS # BLD AUTO: 20.28 X10*3/UL (ref 1.2–7.7)
NEUTROPHILS NFR BLD AUTO: 87.5 %
NRBC BLD-RTO: 0 /100 WBCS (ref 0–0)
PLATELET # BLD AUTO: 429 X10*3/UL (ref 150–450)
POTASSIUM SERPL-SCNC: 4.4 MMOL/L (ref 3.5–5.3)
PROT SERPL-MCNC: 7.6 G/DL (ref 6.4–8.2)
RBC # BLD AUTO: 4.65 X10*6/UL (ref 4–5.2)
SODIUM SERPL-SCNC: 139 MMOL/L (ref 136–145)
WBC # BLD AUTO: 23.2 X10*3/UL (ref 4.4–11.3)

## 2024-06-03 PROCEDURE — 85025 COMPLETE CBC W/AUTO DIFF WBC: CPT

## 2024-06-03 PROCEDURE — 36415 COLL VENOUS BLD VENIPUNCTURE: CPT

## 2024-06-03 PROCEDURE — 80053 COMPREHEN METABOLIC PANEL: CPT

## 2024-06-04 ENCOUNTER — APPOINTMENT (OUTPATIENT)
Dept: HEMATOLOGY/ONCOLOGY | Facility: HOSPITAL | Age: 34
End: 2024-06-04
Payer: COMMERCIAL

## 2024-06-04 ENCOUNTER — OFFICE VISIT (OUTPATIENT)
Dept: HEMATOLOGY/ONCOLOGY | Facility: HOSPITAL | Age: 34
End: 2024-06-04
Payer: COMMERCIAL

## 2024-06-04 ENCOUNTER — INFUSION (OUTPATIENT)
Dept: HEMATOLOGY/ONCOLOGY | Facility: HOSPITAL | Age: 34
End: 2024-06-04
Payer: COMMERCIAL

## 2024-06-04 VITALS
WEIGHT: 222.22 LBS | BODY MASS INDEX: 39.38 KG/M2 | SYSTOLIC BLOOD PRESSURE: 145 MMHG | HEART RATE: 56 BPM | HEIGHT: 63 IN | DIASTOLIC BLOOD PRESSURE: 88 MMHG | TEMPERATURE: 98.2 F | OXYGEN SATURATION: 97 %

## 2024-06-04 DIAGNOSIS — Z17.0 MALIGNANT NEOPLASM OF OVERLAPPING SITES OF LEFT BREAST IN FEMALE, ESTROGEN RECEPTOR POSITIVE (MULTI): ICD-10-CM

## 2024-06-04 DIAGNOSIS — C50.812 MALIGNANT NEOPLASM OF OVERLAPPING SITES OF LEFT BREAST IN FEMALE, ESTROGEN RECEPTOR POSITIVE (MULTI): ICD-10-CM

## 2024-06-04 PROCEDURE — 99214 OFFICE O/P EST MOD 30 MIN: CPT | Performed by: INTERNAL MEDICINE

## 2024-06-04 PROCEDURE — 2500000004 HC RX 250 GENERAL PHARMACY W/ HCPCS (ALT 636 FOR OP/ED): Performed by: INTERNAL MEDICINE

## 2024-06-04 PROCEDURE — 96375 TX/PRO/DX INJ NEW DRUG ADDON: CPT | Mod: INF

## 2024-06-04 PROCEDURE — 3079F DIAST BP 80-89 MM HG: CPT | Performed by: INTERNAL MEDICINE

## 2024-06-04 PROCEDURE — 96413 CHEMO IV INFUSION 1 HR: CPT

## 2024-06-04 PROCEDURE — 2500000001 HC RX 250 WO HCPCS SELF ADMINISTERED DRUGS (ALT 637 FOR MEDICARE OP): Performed by: INTERNAL MEDICINE

## 2024-06-04 PROCEDURE — 96365 THER/PROPH/DIAG IV INF INIT: CPT | Mod: INF

## 2024-06-04 PROCEDURE — 96417 CHEMO IV INFUS EACH ADDL SEQ: CPT

## 2024-06-04 PROCEDURE — 3077F SYST BP >= 140 MM HG: CPT | Performed by: INTERNAL MEDICINE

## 2024-06-04 PROCEDURE — 96360 HYDRATION IV INFUSION INIT: CPT | Mod: INF

## 2024-06-04 PROCEDURE — 96361 HYDRATE IV INFUSION ADD-ON: CPT | Mod: INF

## 2024-06-04 RX ORDER — PROCHLORPERAZINE EDISYLATE 5 MG/ML
10 INJECTION INTRAMUSCULAR; INTRAVENOUS EVERY 6 HOURS PRN
Status: CANCELLED | OUTPATIENT
Start: 2024-06-04

## 2024-06-04 RX ORDER — EPINEPHRINE 0.3 MG/.3ML
0.3 INJECTION SUBCUTANEOUS EVERY 5 MIN PRN
Status: DISCONTINUED | OUTPATIENT
Start: 2024-06-04 | End: 2024-06-04 | Stop reason: HOSPADM

## 2024-06-04 RX ORDER — FAMOTIDINE 10 MG/ML
20 INJECTION INTRAVENOUS ONCE AS NEEDED
Status: DISCONTINUED | OUTPATIENT
Start: 2024-06-04 | End: 2024-06-04 | Stop reason: HOSPADM

## 2024-06-04 RX ORDER — DIPHENHYDRAMINE HYDROCHLORIDE 50 MG/ML
50 INJECTION INTRAMUSCULAR; INTRAVENOUS ONCE
Status: CANCELLED | OUTPATIENT
Start: 2024-06-04 | End: 2024-06-04

## 2024-06-04 RX ORDER — HEPARIN SODIUM,PORCINE/PF 10 UNIT/ML
50 SYRINGE (ML) INTRAVENOUS AS NEEDED
OUTPATIENT
Start: 2024-06-04

## 2024-06-04 RX ORDER — PALONOSETRON 0.05 MG/ML
0.25 INJECTION, SOLUTION INTRAVENOUS ONCE
Status: CANCELLED | OUTPATIENT
Start: 2024-06-04

## 2024-06-04 RX ORDER — DIPHENHYDRAMINE HYDROCHLORIDE 50 MG/ML
50 INJECTION INTRAMUSCULAR; INTRAVENOUS AS NEEDED
Status: CANCELLED | OUTPATIENT
Start: 2024-06-04

## 2024-06-04 RX ORDER — HEPARIN 100 UNIT/ML
500 SYRINGE INTRAVENOUS AS NEEDED
OUTPATIENT
Start: 2024-06-04

## 2024-06-04 RX ORDER — FAMOTIDINE 20 MG/1
20 TABLET, FILM COATED ORAL ONCE
Status: COMPLETED | OUTPATIENT
Start: 2024-06-04 | End: 2024-06-04

## 2024-06-04 RX ORDER — PALONOSETRON 0.05 MG/ML
0.25 INJECTION, SOLUTION INTRAVENOUS ONCE
Status: COMPLETED | OUTPATIENT
Start: 2024-06-04 | End: 2024-06-04

## 2024-06-04 RX ORDER — PROCHLORPERAZINE MALEATE 10 MG
10 TABLET ORAL EVERY 6 HOURS PRN
Status: DISCONTINUED | OUTPATIENT
Start: 2024-06-04 | End: 2024-06-04 | Stop reason: HOSPADM

## 2024-06-04 RX ORDER — ALBUTEROL SULFATE 0.83 MG/ML
3 SOLUTION RESPIRATORY (INHALATION) AS NEEDED
Status: DISCONTINUED | OUTPATIENT
Start: 2024-06-04 | End: 2024-06-04 | Stop reason: HOSPADM

## 2024-06-04 RX ORDER — EPINEPHRINE 0.3 MG/.3ML
0.3 INJECTION SUBCUTANEOUS EVERY 5 MIN PRN
Status: CANCELLED | OUTPATIENT
Start: 2024-06-04

## 2024-06-04 RX ORDER — DIPHENHYDRAMINE HYDROCHLORIDE 50 MG/ML
50 INJECTION INTRAMUSCULAR; INTRAVENOUS AS NEEDED
Status: DISCONTINUED | OUTPATIENT
Start: 2024-06-04 | End: 2024-06-04 | Stop reason: HOSPADM

## 2024-06-04 RX ORDER — PROCHLORPERAZINE MALEATE 10 MG
10 TABLET ORAL EVERY 6 HOURS PRN
Status: CANCELLED | OUTPATIENT
Start: 2024-06-04

## 2024-06-04 RX ORDER — FAMOTIDINE 20 MG/1
20 TABLET, FILM COATED ORAL ONCE
Status: CANCELLED | OUTPATIENT
Start: 2024-06-04 | End: 2024-06-04

## 2024-06-04 RX ORDER — ALBUTEROL SULFATE 0.83 MG/ML
3 SOLUTION RESPIRATORY (INHALATION) AS NEEDED
Status: CANCELLED | OUTPATIENT
Start: 2024-06-04

## 2024-06-04 RX ORDER — FAMOTIDINE 10 MG/ML
20 INJECTION INTRAVENOUS ONCE AS NEEDED
Status: CANCELLED | OUTPATIENT
Start: 2024-06-04

## 2024-06-04 RX ORDER — PROCHLORPERAZINE EDISYLATE 5 MG/ML
10 INJECTION INTRAMUSCULAR; INTRAVENOUS EVERY 6 HOURS PRN
Status: DISCONTINUED | OUTPATIENT
Start: 2024-06-04 | End: 2024-06-04 | Stop reason: HOSPADM

## 2024-06-04 RX ORDER — DIPHENHYDRAMINE HYDROCHLORIDE 50 MG/ML
50 INJECTION INTRAMUSCULAR; INTRAVENOUS ONCE
Status: COMPLETED | OUTPATIENT
Start: 2024-06-04 | End: 2024-06-04

## 2024-06-04 RX ADMIN — SODIUM CHLORIDE 500 ML: 9 INJECTION, SOLUTION INTRAVENOUS at 12:21

## 2024-06-04 RX ADMIN — FAMOTIDINE 20 MG: 20 TABLET, FILM COATED ORAL at 12:44

## 2024-06-04 RX ADMIN — CYCLOPHOSPHAMIDE 1236 MG: 1 INJECTION, POWDER, FOR SOLUTION INTRAVENOUS; ORAL at 16:14

## 2024-06-04 RX ADMIN — DOCETAXEL 155 MG: 10 INJECTION INTRAVENOUS at 14:31

## 2024-06-04 RX ADMIN — PALONOSETRON HYDROCHLORIDE 250 MCG: 0.25 INJECTION INTRAVENOUS at 13:00

## 2024-06-04 RX ADMIN — DIPHENHYDRAMINE HYDROCHLORIDE 50 MG: 50 INJECTION INTRAMUSCULAR; INTRAVENOUS at 13:05

## 2024-06-04 RX ADMIN — DEXAMETHASONE SODIUM PHOSPHATE 12 MG: 10 INJECTION, SOLUTION INTRAMUSCULAR; INTRAVENOUS at 13:10

## 2024-06-04 RX ADMIN — METHYLPREDNISOLONE SODIUM SUCCINATE 40 MG: 40 INJECTION, POWDER, FOR SOLUTION INTRAMUSCULAR; INTRAVENOUS at 13:03

## 2024-06-04 ASSESSMENT — PAIN SCALES - GENERAL: PAINLEVEL: 0-NO PAIN

## 2024-06-04 NOTE — PROGRESS NOTES
Patient tolerated the treatment very well. No complains and discomfort verbalized. All queries and concerns addressed. Discharged to home in stable condition accompanied by significant others.
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
## 2024-06-04 NOTE — PATIENT INSTRUCTIONS
Please schedule a follow up visit with us in 3 weeks .      Please call us at [phone] option 5 then option 2 with any questions or concerns     No infusion tomorrow

## 2024-06-05 ENCOUNTER — APPOINTMENT (OUTPATIENT)
Dept: HEMATOLOGY/ONCOLOGY | Facility: HOSPITAL | Age: 34
End: 2024-06-05
Payer: COMMERCIAL

## 2024-06-11 ENCOUNTER — INFUSION (OUTPATIENT)
Dept: HEMATOLOGY/ONCOLOGY | Facility: HOSPITAL | Age: 34
End: 2024-06-11
Payer: COMMERCIAL

## 2024-06-11 VITALS
RESPIRATION RATE: 19 BRPM | BODY MASS INDEX: 40.37 KG/M2 | OXYGEN SATURATION: 100 % | SYSTOLIC BLOOD PRESSURE: 144 MMHG | DIASTOLIC BLOOD PRESSURE: 92 MMHG | WEIGHT: 227.29 LBS | HEART RATE: 100 BPM | TEMPERATURE: 97.2 F

## 2024-06-11 DIAGNOSIS — Z17.0 MALIGNANT NEOPLASM OF OVERLAPPING SITES OF LEFT BREAST IN FEMALE, ESTROGEN RECEPTOR POSITIVE (MULTI): ICD-10-CM

## 2024-06-11 DIAGNOSIS — C50.812 MALIGNANT NEOPLASM OF OVERLAPPING SITES OF LEFT BREAST IN FEMALE, ESTROGEN RECEPTOR POSITIVE (MULTI): ICD-10-CM

## 2024-06-11 PROCEDURE — 2500000004 HC RX 250 GENERAL PHARMACY W/ HCPCS (ALT 636 FOR OP/ED): Mod: JZ | Performed by: INTERNAL MEDICINE

## 2024-06-11 PROCEDURE — 2500000005 HC RX 250 GENERAL PHARMACY W/O HCPCS: Performed by: INTERNAL MEDICINE

## 2024-06-11 PROCEDURE — 96402 CHEMO HORMON ANTINEOPL SQ/IM: CPT

## 2024-06-11 RX ORDER — LIDOCAINE HYDROCHLORIDE 10 MG/ML
2 INJECTION, SOLUTION EPIDURAL; INFILTRATION; INTRACAUDAL; PERINEURAL ONCE
Status: COMPLETED | OUTPATIENT
Start: 2024-06-11 | End: 2024-06-11

## 2024-06-11 RX ORDER — ALBUTEROL SULFATE 0.83 MG/ML
3 SOLUTION RESPIRATORY (INHALATION) AS NEEDED
OUTPATIENT
Start: 2024-06-25

## 2024-06-11 RX ORDER — EPINEPHRINE 0.3 MG/.3ML
0.3 INJECTION SUBCUTANEOUS EVERY 5 MIN PRN
OUTPATIENT
Start: 2024-06-25

## 2024-06-11 RX ORDER — LIDOCAINE HYDROCHLORIDE 10 MG/ML
2 INJECTION, SOLUTION EPIDURAL; INFILTRATION; INTRACAUDAL; PERINEURAL ONCE
OUTPATIENT
Start: 2024-06-25

## 2024-06-11 RX ORDER — FAMOTIDINE 10 MG/ML
20 INJECTION INTRAVENOUS ONCE AS NEEDED
Status: DISCONTINUED | OUTPATIENT
Start: 2024-06-11 | End: 2024-06-11 | Stop reason: HOSPADM

## 2024-06-11 RX ORDER — FAMOTIDINE 10 MG/ML
20 INJECTION INTRAVENOUS ONCE AS NEEDED
OUTPATIENT
Start: 2024-06-25

## 2024-06-11 RX ORDER — ALBUTEROL SULFATE 0.83 MG/ML
3 SOLUTION RESPIRATORY (INHALATION) AS NEEDED
Status: DISCONTINUED | OUTPATIENT
Start: 2024-06-11 | End: 2024-06-11 | Stop reason: HOSPADM

## 2024-06-11 RX ORDER — EPINEPHRINE 0.3 MG/.3ML
0.3 INJECTION SUBCUTANEOUS EVERY 5 MIN PRN
Status: DISCONTINUED | OUTPATIENT
Start: 2024-06-11 | End: 2024-06-11 | Stop reason: HOSPADM

## 2024-06-11 RX ORDER — DIPHENHYDRAMINE HYDROCHLORIDE 50 MG/ML
50 INJECTION INTRAMUSCULAR; INTRAVENOUS AS NEEDED
OUTPATIENT
Start: 2024-06-25

## 2024-06-11 RX ORDER — DIPHENHYDRAMINE HYDROCHLORIDE 50 MG/ML
50 INJECTION INTRAMUSCULAR; INTRAVENOUS AS NEEDED
Status: DISCONTINUED | OUTPATIENT
Start: 2024-06-11 | End: 2024-06-11 | Stop reason: HOSPADM

## 2024-06-11 RX ADMIN — LIDOCAINE HYDROCHLORIDE 20 MG: 10 INJECTION, SOLUTION EPIDURAL; INFILTRATION; INTRACAUDAL; PERINEURAL at 08:47

## 2024-06-11 RX ADMIN — GOSERELIN ACETATE 3.6 MG: 3.6 IMPLANT SUBCUTANEOUS at 08:51

## 2024-06-11 ASSESSMENT — PAIN SCALES - GENERAL
PAINLEVEL_OUTOF10: 0 - NO PAIN
PAINLEVEL: 0-NO PAIN

## 2024-06-11 NOTE — PROGRESS NOTES
Patient for Zoladex injection. Ice pack applied to injection area prior to administration of Lidocaine. Patient tolerated injection well, site covered with gauze and bandage. Patient ambulated from department under own power, no acute distress noted.

## 2024-06-24 ENCOUNTER — LAB (OUTPATIENT)
Dept: LAB | Facility: LAB | Age: 34
End: 2024-06-24
Payer: COMMERCIAL

## 2024-06-24 DIAGNOSIS — C50.812 MALIGNANT NEOPLASM OF OVERLAPPING SITES OF LEFT BREAST IN FEMALE, ESTROGEN RECEPTOR POSITIVE (MULTI): ICD-10-CM

## 2024-06-24 DIAGNOSIS — Z17.0 MALIGNANT NEOPLASM OF OVERLAPPING SITES OF LEFT BREAST IN FEMALE, ESTROGEN RECEPTOR POSITIVE (MULTI): ICD-10-CM

## 2024-06-24 LAB
BASOPHILS # BLD AUTO: 0.05 X10*3/UL (ref 0–0.1)
BASOPHILS NFR BLD AUTO: 0.3 %
EOSINOPHIL # BLD AUTO: 0 X10*3/UL (ref 0–0.7)
EOSINOPHIL NFR BLD AUTO: 0 %
ERYTHROCYTE [DISTWIDTH] IN BLOOD BY AUTOMATED COUNT: 13.4 % (ref 11.5–14.5)
HCT VFR BLD AUTO: 42.5 % (ref 36–46)
HGB BLD-MCNC: 13.9 G/DL (ref 12–16)
IMM GRANULOCYTES # BLD AUTO: 0.31 X10*3/UL (ref 0–0.7)
IMM GRANULOCYTES NFR BLD AUTO: 1.6 % (ref 0–0.9)
LYMPHOCYTES # BLD AUTO: 1.28 X10*3/UL (ref 1.2–4.8)
LYMPHOCYTES NFR BLD AUTO: 6.7 %
MCH RBC QN AUTO: 27.8 PG (ref 26–34)
MCHC RBC AUTO-ENTMCNC: 32.7 G/DL (ref 32–36)
MCV RBC AUTO: 85 FL (ref 80–100)
MONOCYTES # BLD AUTO: 0.42 X10*3/UL (ref 0.1–1)
MONOCYTES NFR BLD AUTO: 2.2 %
NEUTROPHILS # BLD AUTO: 16.93 X10*3/UL (ref 1.2–7.7)
NEUTROPHILS NFR BLD AUTO: 89.2 %
NRBC BLD-RTO: 0 /100 WBCS (ref 0–0)
PLATELET # BLD AUTO: 465 X10*3/UL (ref 150–450)
RBC # BLD AUTO: 5 X10*6/UL (ref 4–5.2)
WBC # BLD AUTO: 19 X10*3/UL (ref 4.4–11.3)

## 2024-06-24 PROCEDURE — 84075 ASSAY ALKALINE PHOSPHATASE: CPT

## 2024-06-24 PROCEDURE — 82310 ASSAY OF CALCIUM: CPT

## 2024-06-24 PROCEDURE — 84450 TRANSFERASE (AST) (SGOT): CPT

## 2024-06-24 PROCEDURE — 82247 BILIRUBIN TOTAL: CPT

## 2024-06-24 PROCEDURE — 85025 COMPLETE CBC W/AUTO DIFF WBC: CPT

## 2024-06-24 PROCEDURE — 36415 COLL VENOUS BLD VENIPUNCTURE: CPT

## 2024-06-24 PROCEDURE — 82565 ASSAY OF CREATININE: CPT

## 2024-06-24 PROCEDURE — 82040 ASSAY OF SERUM ALBUMIN: CPT

## 2024-06-24 PROCEDURE — 84520 ASSAY OF UREA NITROGEN: CPT

## 2024-06-24 PROCEDURE — 84155 ASSAY OF PROTEIN SERUM: CPT

## 2024-06-24 PROCEDURE — 84460 ALANINE AMINO (ALT) (SGPT): CPT

## 2024-06-24 PROCEDURE — 80051 ELECTROLYTE PANEL: CPT

## 2024-06-25 ENCOUNTER — INFUSION (OUTPATIENT)
Dept: HEMATOLOGY/ONCOLOGY | Facility: HOSPITAL | Age: 34
End: 2024-06-25
Payer: COMMERCIAL

## 2024-06-25 ENCOUNTER — OFFICE VISIT (OUTPATIENT)
Dept: HEMATOLOGY/ONCOLOGY | Facility: HOSPITAL | Age: 34
End: 2024-06-25
Payer: COMMERCIAL

## 2024-06-25 VITALS
HEIGHT: 63 IN | RESPIRATION RATE: 17 BRPM | HEART RATE: 98 BPM | TEMPERATURE: 98.4 F | WEIGHT: 224.87 LBS | BODY MASS INDEX: 39.84 KG/M2 | OXYGEN SATURATION: 96 % | DIASTOLIC BLOOD PRESSURE: 77 MMHG | SYSTOLIC BLOOD PRESSURE: 146 MMHG

## 2024-06-25 DIAGNOSIS — C50.812 MALIGNANT NEOPLASM OF OVERLAPPING SITES OF LEFT BREAST IN FEMALE, ESTROGEN RECEPTOR POSITIVE (MULTI): ICD-10-CM

## 2024-06-25 DIAGNOSIS — Z17.0 MALIGNANT NEOPLASM OF OVERLAPPING SITES OF LEFT BREAST IN FEMALE, ESTROGEN RECEPTOR POSITIVE (MULTI): ICD-10-CM

## 2024-06-25 LAB
ALBUMIN SERPL BCP-MCNC: 4.4 G/DL (ref 3.4–5)
ALBUMIN SERPL BCP-MCNC: 4.6 G/DL (ref 3.4–5)
ALP SERPL-CCNC: 71 U/L (ref 33–110)
ALP SERPL-CCNC: 84 U/L (ref 33–110)
ALT SERPL W P-5'-P-CCNC: 20 U/L (ref 7–45)
ALT SERPL W P-5'-P-CCNC: 23 U/L (ref 7–45)
ANION GAP SERPL CALC-SCNC: 17 MMOL/L (ref 10–20)
ANION GAP SERPL CALC-SCNC: 23 MMOL/L (ref 10–20)
AST SERPL W P-5'-P-CCNC: 16 U/L (ref 9–39)
AST SERPL W P-5'-P-CCNC: 20 U/L (ref 9–39)
BILIRUB SERPL-MCNC: 0.3 MG/DL (ref 0–1.2)
BILIRUB SERPL-MCNC: 0.3 MG/DL (ref 0–1.2)
BUN SERPL-MCNC: 13 MG/DL (ref 6–23)
BUN SERPL-MCNC: 16 MG/DL (ref 6–23)
CALCIUM SERPL-MCNC: 10.1 MG/DL (ref 8.6–10.6)
CALCIUM SERPL-MCNC: 9.9 MG/DL (ref 8.6–10.3)
CHLORIDE SERPL-SCNC: 103 MMOL/L (ref 98–107)
CHLORIDE SERPL-SCNC: 104 MMOL/L (ref 98–107)
CO2 SERPL-SCNC: 23 MMOL/L (ref 21–32)
CO2 SERPL-SCNC: 23 MMOL/L (ref 21–32)
CREAT SERPL-MCNC: 0.64 MG/DL (ref 0.5–1.05)
CREAT SERPL-MCNC: 0.78 MG/DL (ref 0.5–1.05)
EGFRCR SERPLBLD CKD-EPI 2021: >90 ML/MIN/1.73M*2
EGFRCR SERPLBLD CKD-EPI 2021: >90 ML/MIN/1.73M*2
GLUCOSE SERPL-MCNC: 146 MG/DL (ref 74–99)
GLUCOSE SERPL-MCNC: ABNORMAL MG/DL
POTASSIUM SERPL-SCNC: 3.6 MMOL/L (ref 3.5–5.3)
POTASSIUM SERPL-SCNC: 5 MMOL/L (ref 3.5–5.3)
PROT SERPL-MCNC: 7.6 G/DL (ref 6.4–8.2)
PROT SERPL-MCNC: 7.7 G/DL (ref 6.4–8.2)
SODIUM SERPL-SCNC: 140 MMOL/L (ref 136–145)
SODIUM SERPL-SCNC: 144 MMOL/L (ref 136–145)

## 2024-06-25 PROCEDURE — 96415 CHEMO IV INFUSION ADDL HR: CPT

## 2024-06-25 PROCEDURE — 96413 CHEMO IV INFUSION 1 HR: CPT

## 2024-06-25 PROCEDURE — 2500000001 HC RX 250 WO HCPCS SELF ADMINISTERED DRUGS (ALT 637 FOR MEDICARE OP): Performed by: INTERNAL MEDICINE

## 2024-06-25 PROCEDURE — 2500000004 HC RX 250 GENERAL PHARMACY W/ HCPCS (ALT 636 FOR OP/ED): Mod: JZ | Performed by: INTERNAL MEDICINE

## 2024-06-25 PROCEDURE — 96417 CHEMO IV INFUS EACH ADDL SEQ: CPT

## 2024-06-25 PROCEDURE — 3077F SYST BP >= 140 MM HG: CPT | Performed by: INTERNAL MEDICINE

## 2024-06-25 PROCEDURE — 80053 COMPREHEN METABOLIC PANEL: CPT

## 2024-06-25 PROCEDURE — 96375 TX/PRO/DX INJ NEW DRUG ADDON: CPT | Mod: INF

## 2024-06-25 PROCEDURE — 96361 HYDRATE IV INFUSION ADD-ON: CPT | Mod: INF

## 2024-06-25 PROCEDURE — 2500000004 HC RX 250 GENERAL PHARMACY W/ HCPCS (ALT 636 FOR OP/ED): Performed by: INTERNAL MEDICINE

## 2024-06-25 PROCEDURE — 99214 OFFICE O/P EST MOD 30 MIN: CPT | Performed by: INTERNAL MEDICINE

## 2024-06-25 PROCEDURE — 99214 OFFICE O/P EST MOD 30 MIN: CPT | Mod: 25 | Performed by: INTERNAL MEDICINE

## 2024-06-25 PROCEDURE — 3078F DIAST BP <80 MM HG: CPT | Performed by: INTERNAL MEDICINE

## 2024-06-25 RX ORDER — PALONOSETRON 0.05 MG/ML
0.25 INJECTION, SOLUTION INTRAVENOUS ONCE
Status: COMPLETED | OUTPATIENT
Start: 2024-06-25 | End: 2024-06-25

## 2024-06-25 RX ORDER — DIPHENHYDRAMINE HYDROCHLORIDE 50 MG/ML
50 INJECTION INTRAMUSCULAR; INTRAVENOUS AS NEEDED
Status: DISCONTINUED | OUTPATIENT
Start: 2024-06-25 | End: 2024-06-25 | Stop reason: HOSPADM

## 2024-06-25 RX ORDER — ALBUTEROL SULFATE 0.83 MG/ML
3 SOLUTION RESPIRATORY (INHALATION) AS NEEDED
Status: CANCELLED | OUTPATIENT
Start: 2024-06-25

## 2024-06-25 RX ORDER — PROCHLORPERAZINE MALEATE 10 MG
10 TABLET ORAL EVERY 6 HOURS PRN
Status: DISCONTINUED | OUTPATIENT
Start: 2024-06-25 | End: 2024-06-25 | Stop reason: HOSPADM

## 2024-06-25 RX ORDER — ALBUTEROL SULFATE 0.83 MG/ML
3 SOLUTION RESPIRATORY (INHALATION) AS NEEDED
Status: DISCONTINUED | OUTPATIENT
Start: 2024-06-25 | End: 2024-06-25 | Stop reason: HOSPADM

## 2024-06-25 RX ORDER — PROCHLORPERAZINE EDISYLATE 5 MG/ML
10 INJECTION INTRAMUSCULAR; INTRAVENOUS EVERY 6 HOURS PRN
Status: DISCONTINUED | OUTPATIENT
Start: 2024-06-25 | End: 2024-06-25 | Stop reason: HOSPADM

## 2024-06-25 RX ORDER — EPINEPHRINE 0.3 MG/.3ML
0.3 INJECTION SUBCUTANEOUS EVERY 5 MIN PRN
Status: CANCELLED | OUTPATIENT
Start: 2024-06-25

## 2024-06-25 RX ORDER — PROCHLORPERAZINE MALEATE 10 MG
10 TABLET ORAL EVERY 6 HOURS PRN
Status: CANCELLED | OUTPATIENT
Start: 2024-06-25

## 2024-06-25 RX ORDER — FAMOTIDINE 20 MG/1
20 TABLET, FILM COATED ORAL ONCE
Status: COMPLETED | OUTPATIENT
Start: 2024-06-25 | End: 2024-06-25

## 2024-06-25 RX ORDER — DIPHENHYDRAMINE HYDROCHLORIDE 50 MG/ML
50 INJECTION INTRAMUSCULAR; INTRAVENOUS ONCE
Status: COMPLETED | OUTPATIENT
Start: 2024-06-25 | End: 2024-06-25

## 2024-06-25 RX ORDER — PALONOSETRON 0.05 MG/ML
0.25 INJECTION, SOLUTION INTRAVENOUS ONCE
Status: CANCELLED | OUTPATIENT
Start: 2024-06-25

## 2024-06-25 RX ORDER — DIPHENHYDRAMINE HYDROCHLORIDE 50 MG/ML
50 INJECTION INTRAMUSCULAR; INTRAVENOUS AS NEEDED
Status: CANCELLED | OUTPATIENT
Start: 2024-06-25

## 2024-06-25 RX ORDER — FAMOTIDINE 10 MG/ML
20 INJECTION INTRAVENOUS ONCE AS NEEDED
Status: DISCONTINUED | OUTPATIENT
Start: 2024-06-25 | End: 2024-06-25 | Stop reason: HOSPADM

## 2024-06-25 RX ORDER — FAMOTIDINE 10 MG/ML
20 INJECTION INTRAVENOUS ONCE AS NEEDED
Status: CANCELLED | OUTPATIENT
Start: 2024-06-25

## 2024-06-25 RX ORDER — DIPHENHYDRAMINE HYDROCHLORIDE 50 MG/ML
50 INJECTION INTRAMUSCULAR; INTRAVENOUS ONCE
Status: CANCELLED | OUTPATIENT
Start: 2024-06-25 | End: 2024-06-25

## 2024-06-25 RX ORDER — PROCHLORPERAZINE EDISYLATE 5 MG/ML
10 INJECTION INTRAMUSCULAR; INTRAVENOUS EVERY 6 HOURS PRN
Status: CANCELLED | OUTPATIENT
Start: 2024-06-25

## 2024-06-25 RX ORDER — FAMOTIDINE 20 MG/1
20 TABLET, FILM COATED ORAL ONCE
Status: CANCELLED | OUTPATIENT
Start: 2024-06-25 | End: 2024-06-25

## 2024-06-25 RX ORDER — EPINEPHRINE 0.3 MG/.3ML
0.3 INJECTION SUBCUTANEOUS EVERY 5 MIN PRN
Status: DISCONTINUED | OUTPATIENT
Start: 2024-06-25 | End: 2024-06-25 | Stop reason: HOSPADM

## 2024-06-25 ASSESSMENT — PAIN SCALES - GENERAL: PAINLEVEL: 0-NO PAIN

## 2024-06-25 NOTE — PROGRESS NOTES
Pt here for docetaxel/cytoxan infusion, tolerated well. Ran docetaxel at hypersensitivity protocol per orders. Pt was discharged in stable condition, aware of follow up schedule. No additional needs at this time.
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
## 2024-07-03 ENCOUNTER — TELEPHONE (OUTPATIENT)
Dept: RADIATION ONCOLOGY | Facility: HOSPITAL | Age: 34
End: 2024-07-03
Payer: COMMERCIAL

## 2024-07-08 ENCOUNTER — HOSPITAL ENCOUNTER (OUTPATIENT)
Dept: RADIATION ONCOLOGY | Facility: HOSPITAL | Age: 34
Setting detail: RADIATION/ONCOLOGY SERIES
Discharge: HOME | End: 2024-07-08
Payer: COMMERCIAL

## 2024-07-08 VITALS
RESPIRATION RATE: 18 BRPM | OXYGEN SATURATION: 97 % | BODY MASS INDEX: 42.44 KG/M2 | WEIGHT: 230.6 LBS | SYSTOLIC BLOOD PRESSURE: 141 MMHG | DIASTOLIC BLOOD PRESSURE: 92 MMHG | TEMPERATURE: 97.2 F | HEIGHT: 62 IN | HEART RATE: 98 BPM

## 2024-07-08 DIAGNOSIS — E88.810 METABOLIC SYNDROME: ICD-10-CM

## 2024-07-08 DIAGNOSIS — E66.09 OBESITY DUE TO EXCESS CALORIES WITHOUT SERIOUS COMORBIDITY, UNSPECIFIED CLASSIFICATION: ICD-10-CM

## 2024-07-08 DIAGNOSIS — Z17.0 MALIGNANT NEOPLASM OF OVERLAPPING SITES OF LEFT BREAST IN FEMALE, ESTROGEN RECEPTOR POSITIVE (MULTI): ICD-10-CM

## 2024-07-08 DIAGNOSIS — C79.89 CHEST WALL RECURRENCE OF BREAST CANCER, LEFT (MULTI): Primary | ICD-10-CM

## 2024-07-08 DIAGNOSIS — C50.912 CHEST WALL RECURRENCE OF BREAST CANCER, LEFT (MULTI): Primary | ICD-10-CM

## 2024-07-08 DIAGNOSIS — Z98.890 HISTORY OF BREAST RECONSTRUCTION: ICD-10-CM

## 2024-07-08 DIAGNOSIS — Z90.12 ACQUIRED ABSENCE OF LEFT BREAST: ICD-10-CM

## 2024-07-08 DIAGNOSIS — C50.812 MALIGNANT NEOPLASM OF OVERLAPPING SITES OF LEFT BREAST IN FEMALE, ESTROGEN RECEPTOR POSITIVE (MULTI): ICD-10-CM

## 2024-07-08 PROCEDURE — 99215 OFFICE O/P EST HI 40 MIN: CPT | Performed by: RADIOLOGY

## 2024-07-08 PROCEDURE — 99417 PROLNG OP E/M EACH 15 MIN: CPT | Performed by: RADIOLOGY

## 2024-07-08 ASSESSMENT — ENCOUNTER SYMPTOMS
DEPRESSION: 0
RESPIRATORY NEGATIVE: 1
PSYCHIATRIC NEGATIVE: 1
ENDOCRINE NEGATIVE: 1
GASTROINTESTINAL NEGATIVE: 1
CARDIOVASCULAR NEGATIVE: 1
EYES NEGATIVE: 1
CONSTITUTIONAL NEGATIVE: 1
LOSS OF SENSATION IN FEET: 0
OCCASIONAL FEELINGS OF UNSTEADINESS: 0
MUSCULOSKELETAL NEGATIVE: 1
HEMATOLOGIC/LYMPHATIC NEGATIVE: 1
NEUROLOGICAL NEGATIVE: 1

## 2024-07-08 ASSESSMENT — SOCIAL DETERMINANTS OF HEALTH (SDOH)
ARE YOU MARRIED, WIDOWED, DIVORCED, SEPARATED, NEVER MARRIED, OR LIVING WITH A PARTNER?: NEVER MARRIED
IN A TYPICAL WEEK, HOW MANY TIMES DO YOU TALK ON THE PHONE WITH FAMILY, FRIENDS, OR NEIGHBORS?: MORE THAN THREE TIMES A WEEK
HOW OFTEN DO YOU GET TOGETHER WITH FRIENDS OR RELATIVES?: MORE THAN THREE TIMES A WEEK
HOW OFTEN DO YOU ATTENT MEETINGS OF THE CLUB OR ORGANIZATION YOU BELONG TO?: MORE THAN 4 TIMES PER YEAR
DO YOU BELONG TO ANY CLUBS OR ORGANIZATIONS SUCH AS CHURCH GROUPS UNIONS, FRATERNAL OR ATHLETIC GROUPS, OR SCHOOL GROUPS?: YES
HOW OFTEN DO YOU ATTEND CHURCH OR RELIGIOUS SERVICES?: NEVER

## 2024-07-08 ASSESSMENT — ANXIETY QUESTIONNAIRES
GAD7 TOTAL SCORE: 3
2. NOT BEING ABLE TO STOP OR CONTROL WORRYING: NOT AT ALL
4. TROUBLE RELAXING: NOT AT ALL
3. WORRYING TOO MUCH ABOUT DIFFERENT THINGS: NOT AT ALL
6. BECOMING EASILY ANNOYED OR IRRITABLE: MORE THAN HALF THE DAYS
5. BEING SO RESTLESS THAT IT IS HARD TO SIT STILL: NOT AT ALL
1. FEELING NERVOUS, ANXIOUS, OR ON EDGE: SEVERAL DAYS
7. FEELING AFRAID AS IF SOMETHING AWFUL MIGHT HAPPEN: NOT AT ALL

## 2024-07-08 ASSESSMENT — PATIENT HEALTH QUESTIONNAIRE - PHQ9
2. FEELING DOWN, DEPRESSED OR HOPELESS: NOT AT ALL
SUM OF ALL RESPONSES TO PHQ9 QUESTIONS 1 AND 2: 0
1. LITTLE INTEREST OR PLEASURE IN DOING THINGS: NOT AT ALL

## 2024-07-08 NOTE — PROGRESS NOTES
Staff Physician: Jacques Lorenzo MD, PhD  Referring Physician: No ref. provider found  Date of Service: 7/8/2024    RADIATION ONCOLOGY CONSULT NOTE    IDENTIFYING DATA:   Cancer Staging   Chest wall recurrence of breast cancer, left (Multi)  Staging form: Breast, AJCC 8th Edition  - Pathologic stage from 1/31/2024: Stage IB (rpT2, pN0(sn), cM0, G3, ER+, OR+, HER2-) - Signed by Julia Carrasquillo MD on 2/27/2024    Problem List Items Addressed This Visit       Obesity    Metabolic syndrome    Chest wall recurrence of breast cancer, left (Multi) - Primary    Relevant Orders    Rad Onc Intent to Treat    Acquired absence of left breast    Malignant neoplasm of overlapping sites of left breast in female, estrogen receptor positive (Multi)    Relevant Orders    Clinic Appointment Request Follow Up; JACQUES LORENZO (Completed)    History of breast reconstruction     HISTORY OF PRESENT ILLNESS:    Ms. Kirsty Shrestha is a 34 y.o.-year-old with recurrent LEFT breast cancer.    Her brief oncologic history is as follows:    April 8, 2022: Diagnostic mammogram and ultrasound performed demonstrating 1.3 x 0.6 x 0.7 hypoechoic mass in the left breast at the 11 o'clock position 13 cm from the nipple.  Benign-appearing axillary lymph nodes were noted and unremarkable.    April 20, 2022: Left breast biopsy of the mass showed invasive ductal carcinoma, grade 3, ER 95% positive, OR 95% positive, HER2 2+ negative.  MammaPrint high risk luminal B, -0.615.  Mass measured 0.7 cm on ultrasound.    April 28, 2022: Biopsy of an adjacent 2 o'clock position lesion in the left breast showed benign findings.    May 2022: Patient underwent genetic testing with no deleterious mutations identified.    July 14, 2022: After considerable discussion regarding oncoplastic reduction and lumpectomy versus mastectomy with Dr. Pugh and Dr. Barker patient elected proceed with a bilateral mastectomy with immediate bilateral prepectoral tissue expanders.   [place] lymph node biopsy was also performed.  Final pathology showed no residual carcinoma in the left breast and 0 out of 1 lymph nodes involved.  pT1b N0 stage IA.    August 18, 2022: Patient was seen in medical oncology by Dr. Frank Connell.  Given her high risk luminal B MammaPrint status, her young age, her premenopausal status, and the data from the MINDACT trial adjuvant chemotherapy was not recommended.  She was initiated on tamoxifen 20 mg daily but then subsequently stopped taking medication after her prescription ended.    May 23, 2023: Bilateral stage II breast reconstruction with removal of the tissue expanders and placement of saline implants with autologous fat grafting and abdominoplasty.    December 21, 2023: Patient seen in medical oncology by Rukhsana Betts.  She reported at that time a chest wall enlarging mass since her reconstruction in May 2023.    December 21, 2023: Left breast and chest wall ultrasound showed a BI-RADS Category 4 mass at the 12 o'clock position measuring 4.1 cm with a complex cystic and solid mass component, with left prominent lymphadenopathy, biopsy was recommended.  Mass measured 4.1 x 1.7 x 3.5 cm on ultrasound.    December 22, 2023: Biopsy of the left breast mass showed invasive ductal carcinoma, grade 3, ER/MA positive, HER2/contreras negative    January 10, 2024: Bilateral breast MRI showed no malignancy in the left breast measuring 3.8 cm.  There was indeterminate left axillary lymph node.  There is a nonspecific internal mammary lymph node in the left.  There is no evidence of malignancy in the right reconstructed breast.    January 12, 2024: CT of the chest abdomen pelvis and bone scan showed an IV enhancing lesion located within the uterus measuring 4.1 cm in diameter that is stable to mildly increased with most common entities including leiomyoma,, additionally malignant neoplasm of the liver cannot be excluded, in the left left biopsy-proven reconstructed breast there  is a mixed solid/cystic nodular density anterior to the left breast implant.  No evidence of osseous metastatic disease was noted.    January 12, 2024.  Patient met with Dr. Renee Pugh to discuss surgical options.  Plan was for wide excision and repeat sentinel lymph node biopsy.    January 31, 2024: Patient went a lumpectomy and sentinel lymph node biopsy with final pathology showing invasive ductal carcinoma, grade 3, multifocal with at least 4 foci of disease measuring 3.2, 1.1, 0.7, and 0.4 mm.  There was associated high-grade DCIS with comedonecrosis.  Invasive carcinoma was present in the lateral margin, within microns of the posterior margin, and send 1 mm from the anterior medial margin and the main specimen, all margins were negative for DCIS though there was a close 1 mm margin posteriorly as well as anterior medial and medial.  0 out of 1 lymph nodes involved at sentinel.    February 15, 2024: Her case was discussed at multidisciplinary tumor board for her recurrent pT2 N0 M0 stage Ib invasive ductal carcinoma of the left breast.  The recommendation was for resection of the positive margins and then referral to medical oncology to discuss TC based chemotherapy followed by adjuvant chest wall and naina radiation.    February 21, 2024: Reexcision lumpectomy with removal of the left saline implant.  Final pathology showed no evidence of residual carcinoma and findings consistent with previous operative site.     February 26, 2024:  Patient underwent surgical resection of the left breast capsule which showed fibrous tissue with chronic inflammation consistent with capsular changes.  This was done by Dr. Barker.    February 27, 2024: Patient was seen by Dr. Julia Carrasquillo in medical oncology to discuss adjuvant therapy.  Patient was recommended undergo adjuvant TC based chemotherapy for 4 cycles.  She is also interested in fertility preservation is referred to specialists for further evaluation.  She is also  recommended to undergo systemic endocrine therapy with ovarian suppression plus an AI after she completes radiation treatment.    2024: She returned to Dr. Carrasquillo and medical oncology and plans to proceed with Taxotere and cyclophosphamide x 4    2024: She met with me in radiation oncology to discuss the role of adjuvant radiation therapy for her recurrent T2b N0 4.1 cm high-grade ER/MS positive HER2/contreras negative left breast cancer status post recurrence after some tamoxifen exposure status post reexcision with associated high-grade DCIS.      Patient went on to receive 4 cycles of TC based chemotherapy which she tolerated well after initial infusion reaction with cycle 1.  Her last cycle is now complete and she presents today to discuss initiating adjuvant radiation therapy.    Today she reports that she is feeling generally well.  She denies fevers, chills, night sweats, nausea, headaches, vision changes, bone pain, shortness of breath.  She is planning begin chemotherapy and is here to discuss the role of radiation treatment.    See accompanying nursing note for full review of systems    PAST MEDICAL HISTORY:  Past Medical History:   Diagnosis Date    Breast cancer (Multi)     Left, recurrent    History of gestational diabetes     CHRISTA (obstructive sleep apnea)     PONV (postoperative nausea and vomiting)      PAST SURGICAL HISTORY:  Past Surgical History:   Procedure Laterality Date    BI US GUIDED BREAST LOCALIZATION AND BIOPSY LEFT Left 2023    BI US GUIDED BREAST LOCALIZATION AND BIOPSY LEFT 2023 Briana Bauer MD [facility] JOANA    BREAST LUMPECTOMY Left 2024    With left SLNB    BREAST LUMPECTOMY Left 2024    Re-excision for positive margins    BREAST RECONSTRUCTION  2023    Second stage, abdominoplasty    BREAST RECONSTRUCTION  2024    Left Revision    CARPAL TUNNEL RELEASE Bilateral      SECTION, LOW TRANSVERSE      LAPAROTOMY SALPINGO OOPHORECTOMY  Right 2022    MASTECTOMY Bilateral 2022    Left SLNB, immediate reconstruction    OVARIAN CYST SURGERY  10/21/2020     PAST GYNECOLOGIC HISTORY: Patient went menarche at age 14, G1, P1, age of live first birth at 21, premenopausal, had a Mirena IUD device.  Has met with fertility preservation specialist and completed egg retrieval with 8 eggs retrieved.    ALLERGIES:  No Known Allergies  MEDICATIONS:    Current Outpatient Medications:     dexAMETHasone (Decadron) 4 mg tablet, Take 2 tablets (8 mg) by mouth 2 times a day. Take 2 tablets 2 times a day x 3 days, starting 24 hours before chemotherapy, Disp: 6 tablet, Rfl: 3    fexofenadine-pseudoephedrine (Allegra-D 24) 180-240 mg 24 hr tablet, Take 1 tablet by mouth once daily. Do not crush, chew, or split., Disp: , Rfl:     levonorgestrel (Mirena) 21 mcg/24 hours (8 yrs) 52 mg IUD, 52 mg by intrauterine route once daily., Disp: , Rfl:    SOCIAL HISTORY:  Social History     Tobacco Use    Smoking status: Never     Passive exposure: Never    Smokeless tobacco: Never   Substance Use Topics    Alcohol use: Yes     Comment: Socially     FAMILY HISTORY:  Maternal grandmother with breast cancer in her 50s, maternal second cousin with breast cancer in her 30s.  Genetic testing completed with no deleterious mutations noted.    REVIEW OF SYSTEMS:  Except for the symptoms described above, the review of systems is negative. Specifically, except as noted in the HPI, when asked the patient expressed no complaints relative to constitutional (fever, weight loss), eyes, ears, nose, mouth, throat, neurologic, cardiovascular, pulmonary, breast, GI, , skin, musculoskeletal, endocrine, hematologic/lymphatic, or immunologic systems.    RADIATION SCREENING QUESTIONS:  Prior radiation therapy: No  Pacemaker: No  Other implantable devices: No  Connective tissue disease: No    PERFORMANCE STATUS:  Karnofsky Performance Score/ECO, Fully active, able to carry on all  "pre-disease performed without restriction (ECOG equivalent 0)    PHYSICAL EXAMINATION:  BP (!) 141/92   Pulse 98   Temp 36.2 °C (97.2 °F) (Temporal)   Resp 18   Ht 1.575 m (5' 2\")   Wt 105 kg (230 lb 9.6 oz)   SpO2 97%   BMI 42.18 kg/m²   Pain score: 0/10  General: no acute distress, engaged in conversation.   HEENT: Normocephalic, atraumatic. Extraocular movements are intact.   Neck: supple with trachea at midline, no palpable adenopathy.   Pulmonary: Breathing comfortably on room air, no respiratory distress  Cardiovascular: Regular rate, no cyanosis, well-perfused  Abdomen: Soft, nontender, nondistended.   Extremities: No lower extremity edema or cyanosis. Normal range of motion.  Skin: Without rash or obvious lesions.   Musculoskeletal: Normal range of motion. Able to raise both arms above head without issues  Neurologic: Alert and oriented x3. Cranial nerves grossly intact.   Breast: Examination of the bilateral breast reveals no evidence of erythema, no masses, Surgical incisions well-healed, left axillary incision also well-healed, no evidence of masses or adenopathy.    LABORATORY AND IMAGING DATA:  Imaging: All imaging was personally reviewed and interpreted in clinic. Findings as per HPI and EMR.    Laboratory/Pathology:  All pertinent labs and pathology were personally reviewed and interpreted in clinic. Findings as per HPI and EMR.    IMPRESSION:  Ms. García is a 33-year-old female originally diagnosed in April 2022 with a T1b N0 M0 grade 3 invasive ductal carcinoma of the left breast, ER 95% positive, MS 95% positive, HER2/contreras negative, high risk luminal B on MammaPrint status post bilateral mastectomy with expander placement and sentinel lymph node biopsy which showed 7 mm of disease, negative surgical margins, 0 out of 1 lymph nodes involved who subsequently underwent bilateral expander exchange in May 2023 with a isolated local recurrence in the left breast after minimal tamoxifen exposure " in December 2023 status post resection x 2 for 4.1 cm left breast ER/IL positive HER2/contreras negative grade 3 invasive ductal carcinoma now completing TC based chemotherapy x 4 cycles here for radiation initiation.    We had a detailed discussion with the patient regarding the role of post-mastectomy radiation therapy (PMRT). In the node-negative setting, clear indications for PMRT include tumor size > 5 cm (based on inclusion in the [place] trial), recurrent breast cancer, and positive margins. In a retrospective analysis of nearly 900 patients with node-negative breast cancer, additional independent risk factors for LRR included size > 2 cm, margins < 2 mm, premenopausal status, and LVI. The rates of LRR for 0 risk factors was 1.2%; 1 risk factor 10%; 2 risk factors 17.9%; and 3 risk factors 40.6%. The vast majority (80%) of failures occurred in the chest wall, suggesting that chest wall irradiation alone may be a reasonable approach for these patients.  Ms. Shrestha had 4 risk factors (young age, size >2cm, extensive LVSI, and close margins).    We also discussed our recommendation for regional naina irradiation given her large recurrence and limited naina resection. There are at least two trials investigating the addition of regional naina irradiation in the setting of high-risk disease, MA.20 and EORTC 84852. Both of these trials included patients with node-positive disease or with high-risk node-negative disease. Both of these trials showed an improvement in disease-free survival with the addition of regional naina irradiation, although this did not impact overall survival. In the MA.20 trial, this also improved local recurrence and distant metastases. Patients with hormone-positive disease who received hormonal therapy and chemotherapy appeared to be more likely to benefit from regional naina irradiation in the EORTC 73687 trial. These trials included radiation to the level 3/undissected axilla, supraclavicular  nodes, and internal mammary nodes. We discussed that approximately 14% of the patients of the START trials received naina irradiation, and that there is an older [place] trial where patients received hypofractionated naina irradiation. Finally, we discussed the recently presented study of Radiation Fractionation on Patient Outcomes After Breast REConstruction (FABREC) trial. This 400 patient randomized trial compared standard 6-7 week radiation with a shorter 3-4 week course. Both the accelerated and standard courses of treatment were equally effective at preventing the cancer from returning and had the same level of side effects. The patients in the shorter course treatment arm experienced fewer treatment disruptions and a lower financial burden as well. Thus, we feel comfortable delivering regional naina irradiation in a hypofractionated fashion. Additionally, we discussed that long term follow up of the trials discussed above (and others) suggest that patients may even have decreased acute toxicity and improved cosmesis with hypofractionation. After a thorough discussion of the above, the patient elected for hypofractionation.     Finally, we discussed the side effects and risk of PMRT, including the need for CT simulation, tattoo placement, and daily radiation (M-F) x 16 days. We discussed the side effects and risks of radiation, including fatigue, radiation dermatitis, and breast pain in the short-term; skin fibrosis, pigmentation, chest wall pain, damage to underlying lung, rib, or heart, and secondary malignancies in the long-term. All questions were answered to the best of our ability.  She will be scheduled for CT simulation.    PLAN:  -Patient completed TC based chemotherapy for 4 cycles  -Plan chest wall and resection area radiotherapy with regional naina irradiation to all levels of the axilla, supraclavicular region, and internal mammary lymph nodes.  -Given her young age, reconstruction, and  anatomy, will plan for VMAT with ABC to 42.56 [place] in 16 fractions.  Will determine whether an integrated simultaneous boost to the resection area is warranted at time of simulation.    Jacques Lorenzo MD PhD   Professor, Radiation Oncology  7/8/2024    Over 65 minutes was spent in evaluating, counseling, and examining the patient. More than 50% of that time was spent in face to face discussion.

## 2024-07-08 NOTE — PROGRESS NOTES
Radiation Oncology Outpatient Consult    Patient Name:  Kirsty Shrestha  MRN:  [id number]  :  1990    Referring Provider: No ref. provider found  Primary Care Provider: Caesar Hu MD  Care Team: Patient Care Team:  Caesar Hu MD as PCP - General (Hospitalist)  Roslyn Wheatley PA-C as PCP - [facility] PCP  Julia Carrasquillo MD as Consulting Physician (Hematology and Oncology)  Julia Nguyen RN as Registered Nurse (Reproductive Endocrinology and Infertility)    Date of Service: 2024     SUBJECTIVE  History of Present Illness:  Kirsty Shrestha is a 34 y.o. female who was referred by No ref. provider found, for a consultation to the [facility] Department of Radiation Oncology.  She is presenting for evaluation and management of left breast CA.     Prior Radiotherapy:  No radiation treatments to show. (Treatments may have been administered in another system.)       Past Medical History:    Past Medical History:   Diagnosis Date    Breast cancer (Multi)     Left, recurrent    History of gestational diabetes     CHRISTA (obstructive sleep apnea)     PONV (postoperative nausea and vomiting)         Past Surgical History:    Past Surgical History:   Procedure Laterality Date    BI US GUIDED BREAST LOCALIZATION AND BIOPSY LEFT Left 2023    BI US GUIDED BREAST LOCALIZATION AND BIOPSY LEFT 2023 Briana Bauer MD [facility]    BREAST LUMPECTOMY Left 2024    With left SLNB    BREAST LUMPECTOMY Left 2024    Re-excision for positive margins    BREAST RECONSTRUCTION  2023    Second stage, abdominoplasty    BREAST RECONSTRUCTION  2024    Left Revision    CARPAL TUNNEL RELEASE Bilateral      SECTION, LOW TRANSVERSE      LAPAROTOMY SALPINGO OOPHORECTOMY Right 2022    MASTECTOMY Bilateral 2022    Left SLNB, immediate reconstruction    OVARIAN CYST SURGERY  10/21/2020        Family History:  Cancer-related family history is not  "on file.    Social History:    Social History     Tobacco Use    Smoking status: Never     Passive exposure: Never    Smokeless tobacco: Never   Substance Use Topics    Alcohol use: Yes     Comment: Socially    Drug use: Never       Allergies:  No Known Allergies     Medications:    Current Outpatient Medications:     dexAMETHasone (Decadron) 4 mg tablet, Take 2 tablets (8 mg) by mouth 2 times a day. Take 2 tablets 2 times a day x 3 days, starting 24 hours before chemotherapy, Disp: 6 tablet, Rfl: 3    fexofenadine-pseudoephedrine (Allegra-D 24) 180-240 mg 24 hr tablet, Take 1 tablet by mouth once daily. Do not crush, chew, or split., Disp: , Rfl:     levonorgestrel (Mirena) 21 mcg/24 hours (8 yrs) 52 mg IUD, 52 mg by intrauterine route once daily., Disp: , Rfl:       Review of Systems:  Review of Systems   Constitutional: Negative.    HENT:  Negative.     Eyes: Negative.    Respiratory: Negative.     Cardiovascular: Negative.    Gastrointestinal: Negative.    Endocrine: Negative.    Genitourinary: Negative.     Musculoskeletal: Negative.    Skin: Negative.    Neurological: Negative.    Hematological: Negative.    Psychiatric/Behavioral: Negative.         Performance Status:  The Karnofsky performance scale today is 100, Fully active, able to carry on all pre-disease performed without restriction (ECOG equivalent 0).        OBJECTIVE  BP (!) 141/92   Pulse 98   Temp 36.2 °C (97.2 °F) (Temporal)   Resp 18   Ht 1.575 m (5' 2\")   Wt 105 kg (230 lb 9.6 oz)   SpO2 97%   BMI 42.18 kg/m²    Physical Exam  Constitutional:       Appearance: She is normal weight.   HENT:      Head: Normocephalic.      Nose: Nose normal.      Mouth/Throat:      Mouth: Mucous membranes are moist.      Pharynx: Oropharynx is clear.   Eyes:      Pupils: Pupils are equal, round, and reactive to light.   Cardiovascular:      Rate and Rhythm: Normal rate.   Pulmonary:      Effort: Pulmonary effort is normal.      Breath sounds: Normal breath " sounds.   Abdominal:      General: Abdomen is flat. Bowel sounds are normal.   Musculoskeletal:         General: Normal range of motion.      Cervical back: Normal range of motion.   Skin:     General: Skin is warm and dry.   Neurological:      General: No focal deficit present.      Mental Status: She is alert and oriented to person, place, and time. Mental status is at baseline.   Psychiatric:         Mood and Affect: Mood normal.         Behavior: Behavior normal.         Thought Content: Thought content normal.         Judgment: Judgment normal.          Laboratory Review:  There are no laboratory contraindications to radiation therapy.    The pertinent lab results were reviewed and discussed with the patient.  Notably,       Pathology Review:  The pertinent pathology results were reviewed and discussed with the patient.  Notably,      Imaging:  The pertinent imaging results were reviewed and discussed with the patient.  Notably,        ASSESSMENT:   Kirsty Shrestha is a 34 y.o. female with Chest wall recurrence of breast cancer, left (Multi), Pathologic: Stage IB (rpT2, pN0(sn), cM0, G3, ER+, DC+, HER2-).  .     PLAN:   .  NCCN Guidelines were applicable to guide this patients treatment plan.   Yogesh Martinez RN

## 2024-07-09 ENCOUNTER — INFUSION (OUTPATIENT)
Dept: HEMATOLOGY/ONCOLOGY | Facility: HOSPITAL | Age: 34
End: 2024-07-09
Payer: COMMERCIAL

## 2024-07-09 VITALS
BODY MASS INDEX: 41.02 KG/M2 | SYSTOLIC BLOOD PRESSURE: 163 MMHG | OXYGEN SATURATION: 100 % | RESPIRATION RATE: 18 BRPM | TEMPERATURE: 98.2 F | DIASTOLIC BLOOD PRESSURE: 96 MMHG | HEIGHT: 63 IN | WEIGHT: 231.48 LBS | HEART RATE: 102 BPM

## 2024-07-09 DIAGNOSIS — Z17.0 MALIGNANT NEOPLASM OF OVERLAPPING SITES OF LEFT BREAST IN FEMALE, ESTROGEN RECEPTOR POSITIVE (MULTI): ICD-10-CM

## 2024-07-09 DIAGNOSIS — C50.812 MALIGNANT NEOPLASM OF OVERLAPPING SITES OF LEFT BREAST IN FEMALE, ESTROGEN RECEPTOR POSITIVE (MULTI): ICD-10-CM

## 2024-07-09 PROCEDURE — 96402 CHEMO HORMON ANTINEOPL SQ/IM: CPT

## 2024-07-09 PROCEDURE — 96372 THER/PROPH/DIAG INJ SC/IM: CPT

## 2024-07-09 PROCEDURE — 2500000005 HC RX 250 GENERAL PHARMACY W/O HCPCS: Performed by: INTERNAL MEDICINE

## 2024-07-09 PROCEDURE — 2500000004 HC RX 250 GENERAL PHARMACY W/ HCPCS (ALT 636 FOR OP/ED): Mod: JZ | Performed by: INTERNAL MEDICINE

## 2024-07-09 RX ORDER — FAMOTIDINE 10 MG/ML
20 INJECTION INTRAVENOUS ONCE AS NEEDED
OUTPATIENT
Start: 2024-08-06

## 2024-07-09 RX ORDER — ALBUTEROL SULFATE 0.83 MG/ML
3 SOLUTION RESPIRATORY (INHALATION) AS NEEDED
OUTPATIENT
Start: 2024-08-06

## 2024-07-09 RX ORDER — EPINEPHRINE 0.3 MG/.3ML
0.3 INJECTION SUBCUTANEOUS EVERY 5 MIN PRN
OUTPATIENT
Start: 2024-08-06

## 2024-07-09 RX ORDER — LIDOCAINE HYDROCHLORIDE 10 MG/ML
2 INJECTION, SOLUTION EPIDURAL; INFILTRATION; INTRACAUDAL; PERINEURAL ONCE
OUTPATIENT
Start: 2024-08-06

## 2024-07-09 RX ORDER — DIPHENHYDRAMINE HYDROCHLORIDE 50 MG/ML
50 INJECTION INTRAMUSCULAR; INTRAVENOUS AS NEEDED
OUTPATIENT
Start: 2024-08-06

## 2024-07-09 RX ORDER — LIDOCAINE HYDROCHLORIDE 10 MG/ML
2 INJECTION, SOLUTION EPIDURAL; INFILTRATION; INTRACAUDAL; PERINEURAL ONCE
Status: COMPLETED | OUTPATIENT
Start: 2024-07-09 | End: 2024-07-09

## 2024-07-09 NOTE — PROGRESS NOTES
Patient for Goserelin injection. Given in LLQ, patient tolerated well. Discharged home, ambulated from department under own power, no acute distress noted.

## 2024-07-15 ENCOUNTER — HOSPITAL ENCOUNTER (OUTPATIENT)
Dept: RADIOLOGY | Facility: EXTERNAL LOCATION | Age: 34
Discharge: HOME | End: 2024-07-15

## 2024-07-15 ENCOUNTER — LAB (OUTPATIENT)
Dept: LAB | Facility: LAB | Age: 34
End: 2024-07-15
Payer: COMMERCIAL

## 2024-07-15 ENCOUNTER — HOSPITAL ENCOUNTER (OUTPATIENT)
Dept: RADIATION ONCOLOGY | Facility: HOSPITAL | Age: 34
Setting detail: RADIATION/ONCOLOGY SERIES
Discharge: HOME | End: 2024-07-15
Payer: COMMERCIAL

## 2024-07-15 DIAGNOSIS — C50.812 MALIGNANT NEOPLASM OF OVERLAPPING SITES OF LEFT FEMALE BREAST, UNSPECIFIED ESTROGEN RECEPTOR STATUS (MULTI): Primary | ICD-10-CM

## 2024-07-15 DIAGNOSIS — C79.89 CHEST WALL RECURRENCE OF BREAST CANCER, LEFT (MULTI): ICD-10-CM

## 2024-07-15 DIAGNOSIS — Z00.00 HEALTHCARE MAINTENANCE: ICD-10-CM

## 2024-07-15 DIAGNOSIS — C50.812 MALIGNANT NEOPLASM OF OVERLAPPING SITES OF LEFT FEMALE BREAST, UNSPECIFIED ESTROGEN RECEPTOR STATUS (MULTI): ICD-10-CM

## 2024-07-15 DIAGNOSIS — C50.912 CHEST WALL RECURRENCE OF BREAST CANCER, LEFT (MULTI): ICD-10-CM

## 2024-07-15 DIAGNOSIS — Z13.71 SCREENING FOR GENETIC DISEASE CARRIER STATUS: ICD-10-CM

## 2024-07-15 LAB
ANION GAP SERPL CALC-SCNC: 12 MMOL/L (ref 10–20)
BUN SERPL-MCNC: 15 MG/DL (ref 6–23)
CALCIUM SERPL-MCNC: 9.9 MG/DL (ref 8.6–10.6)
CHLORIDE SERPL-SCNC: 105 MMOL/L (ref 98–107)
CHOLEST SERPL-MCNC: 198 MG/DL (ref 0–199)
CHOLESTEROL/HDL RATIO: 4.4
CO2 SERPL-SCNC: 26 MMOL/L (ref 21–32)
CREAT SERPL-MCNC: 0.7 MG/DL (ref 0.5–1.05)
EGFRCR SERPLBLD CKD-EPI 2021: >90 ML/MIN/1.73M*2
ERYTHROCYTE [DISTWIDTH] IN BLOOD BY AUTOMATED COUNT: 14.4 % (ref 11.5–14.5)
EST. AVERAGE GLUCOSE BLD GHB EST-MCNC: 117 MG/DL
GLUCOSE SERPL-MCNC: 93 MG/DL (ref 74–99)
HBA1C MFR BLD: 5.7 %
HCT VFR BLD AUTO: 39.8 % (ref 36–46)
HDLC SERPL-MCNC: 44.7 MG/DL
HGB BLD-MCNC: 13 G/DL (ref 12–16)
LDLC SERPL DIRECT ASSAY-MCNC: 138 MG/DL (ref 0–129)
MCH RBC QN AUTO: 27.5 PG (ref 26–34)
MCHC RBC AUTO-ENTMCNC: 32.7 G/DL (ref 32–36)
MCV RBC AUTO: 84 FL (ref 80–100)
NON-HDL CHOLESTEROL: 153 MG/DL (ref 0–149)
NRBC BLD-RTO: 0.1 /100 WBCS (ref 0–0)
PLATELET # BLD AUTO: 432 X10*3/UL (ref 150–450)
POTASSIUM SERPL-SCNC: 4.4 MMOL/L (ref 3.5–5.3)
RBC # BLD AUTO: 4.72 X10*6/UL (ref 4–5.2)
SODIUM SERPL-SCNC: 139 MMOL/L (ref 136–145)
T4 FREE SERPL-MCNC: 1.02 NG/DL (ref 0.78–1.48)
TSH SERPL-ACNC: 0.26 MIU/L (ref 0.44–3.98)
WBC # BLD AUTO: 17.3 X10*3/UL (ref 4.4–11.3)

## 2024-07-15 PROCEDURE — 85027 COMPLETE CBC AUTOMATED: CPT

## 2024-07-15 PROCEDURE — 77290 THER RAD SIMULAJ FIELD CPLX: CPT | Performed by: RADIOLOGY

## 2024-07-15 PROCEDURE — 80048 BASIC METABOLIC PNL TOTAL CA: CPT

## 2024-07-15 PROCEDURE — 83036 HEMOGLOBIN GLYCOSYLATED A1C: CPT

## 2024-07-15 PROCEDURE — 84443 ASSAY THYROID STIM HORMONE: CPT

## 2024-07-15 PROCEDURE — 36415 COLL VENOUS BLD VENIPUNCTURE: CPT

## 2024-07-15 PROCEDURE — 83718 ASSAY OF LIPOPROTEIN: CPT

## 2024-07-15 PROCEDURE — 77332 RADIATION TREATMENT AID(S): CPT | Mod: 59 | Performed by: RADIOLOGY

## 2024-07-15 PROCEDURE — 82465 ASSAY BLD/SERUM CHOLESTEROL: CPT

## 2024-07-15 PROCEDURE — 84439 ASSAY OF FREE THYROXINE: CPT

## 2024-07-15 PROCEDURE — 77334 RADIATION TREATMENT AID(S): CPT | Performed by: RADIOLOGY

## 2024-07-15 PROCEDURE — 83721 ASSAY OF BLOOD LIPOPROTEIN: CPT

## 2024-07-16 ENCOUNTER — OFFICE VISIT (OUTPATIENT)
Dept: HEMATOLOGY/ONCOLOGY | Facility: HOSPITAL | Age: 34
End: 2024-07-16
Payer: COMMERCIAL

## 2024-07-16 ENCOUNTER — INFUSION (OUTPATIENT)
Dept: HEMATOLOGY/ONCOLOGY | Facility: HOSPITAL | Age: 34
End: 2024-07-16
Payer: COMMERCIAL

## 2024-07-16 VITALS
SYSTOLIC BLOOD PRESSURE: 146 MMHG | RESPIRATION RATE: 17 BRPM | WEIGHT: 228.4 LBS | TEMPERATURE: 97.2 F | HEART RATE: 71 BPM | HEIGHT: 63 IN | DIASTOLIC BLOOD PRESSURE: 77 MMHG | BODY MASS INDEX: 40.47 KG/M2 | OXYGEN SATURATION: 96 %

## 2024-07-16 DIAGNOSIS — C50.812 MALIGNANT NEOPLASM OF OVERLAPPING SITES OF LEFT BREAST IN FEMALE, ESTROGEN RECEPTOR POSITIVE (MULTI): ICD-10-CM

## 2024-07-16 DIAGNOSIS — Z17.0 MALIGNANT NEOPLASM OF OVERLAPPING SITES OF LEFT BREAST IN FEMALE, ESTROGEN RECEPTOR POSITIVE (MULTI): Primary | ICD-10-CM

## 2024-07-16 DIAGNOSIS — C50.812 MALIGNANT NEOPLASM OF OVERLAPPING SITES OF LEFT BREAST IN FEMALE, ESTROGEN RECEPTOR POSITIVE (MULTI): Primary | ICD-10-CM

## 2024-07-16 DIAGNOSIS — Z17.0 MALIGNANT NEOPLASM OF OVERLAPPING SITES OF LEFT BREAST IN FEMALE, ESTROGEN RECEPTOR POSITIVE (MULTI): ICD-10-CM

## 2024-07-16 LAB
ALBUMIN SERPL BCP-MCNC: 4.4 G/DL (ref 3.4–5)
ALP SERPL-CCNC: 76 U/L (ref 33–110)
ALT SERPL W P-5'-P-CCNC: 20 U/L (ref 7–45)
AST SERPL W P-5'-P-CCNC: 14 U/L (ref 9–39)
BASOPHILS # BLD AUTO: 0.07 X10*3/UL (ref 0–0.1)
BASOPHILS NFR BLD AUTO: 0.4 %
BILIRUB DIRECT SERPL-MCNC: 0.1 MG/DL (ref 0–0.3)
BILIRUB SERPL-MCNC: 0.4 MG/DL (ref 0–1.2)
EOSINOPHIL # BLD AUTO: 0 X10*3/UL (ref 0–0.7)
EOSINOPHIL NFR BLD AUTO: 0 %
ERYTHROCYTE [DISTWIDTH] IN BLOOD BY AUTOMATED COUNT: 14 % (ref 11.5–14.5)
HCT VFR BLD AUTO: 39.8 % (ref 36–46)
HGB BLD-MCNC: 13.4 G/DL (ref 12–16)
IMM GRANULOCYTES # BLD AUTO: 0.74 X10*3/UL (ref 0–0.7)
IMM GRANULOCYTES NFR BLD AUTO: 4 % (ref 0–0.9)
LYMPHOCYTES # BLD AUTO: 2.41 X10*3/UL (ref 1.2–4.8)
LYMPHOCYTES NFR BLD AUTO: 13 %
MCH RBC QN AUTO: 28 PG (ref 26–34)
MCHC RBC AUTO-ENTMCNC: 33.7 G/DL (ref 32–36)
MCV RBC AUTO: 83 FL (ref 80–100)
MONOCYTES # BLD AUTO: 0.65 X10*3/UL (ref 0.1–1)
MONOCYTES NFR BLD AUTO: 3.5 %
NEUTROPHILS # BLD AUTO: 14.73 X10*3/UL (ref 1.2–7.7)
NEUTROPHILS NFR BLD AUTO: 79.1 %
NRBC BLD-RTO: 0 /100 WBCS (ref 0–0)
PLATELET # BLD AUTO: 452 X10*3/UL (ref 150–450)
PROT SERPL-MCNC: 7.3 G/DL (ref 6.4–8.2)
RBC # BLD AUTO: 4.78 X10*6/UL (ref 4–5.2)
WBC # BLD AUTO: 18.6 X10*3/UL (ref 4.4–11.3)

## 2024-07-16 PROCEDURE — 96415 CHEMO IV INFUSION ADDL HR: CPT

## 2024-07-16 PROCEDURE — 3077F SYST BP >= 140 MM HG: CPT | Performed by: INTERNAL MEDICINE

## 2024-07-16 PROCEDURE — 3078F DIAST BP <80 MM HG: CPT | Performed by: INTERNAL MEDICINE

## 2024-07-16 PROCEDURE — 84075 ASSAY ALKALINE PHOSPHATASE: CPT

## 2024-07-16 PROCEDURE — 96413 CHEMO IV INFUSION 1 HR: CPT

## 2024-07-16 PROCEDURE — 2500000004 HC RX 250 GENERAL PHARMACY W/ HCPCS (ALT 636 FOR OP/ED): Performed by: INTERNAL MEDICINE

## 2024-07-16 PROCEDURE — 96374 THER/PROPH/DIAG INJ IV PUSH: CPT | Mod: INF

## 2024-07-16 PROCEDURE — 96360 HYDRATION IV INFUSION INIT: CPT | Mod: INF

## 2024-07-16 PROCEDURE — 96417 CHEMO IV INFUS EACH ADDL SEQ: CPT

## 2024-07-16 PROCEDURE — 2500000004 HC RX 250 GENERAL PHARMACY W/ HCPCS (ALT 636 FOR OP/ED): Mod: JW | Performed by: INTERNAL MEDICINE

## 2024-07-16 PROCEDURE — 99214 OFFICE O/P EST MOD 30 MIN: CPT | Mod: 25 | Performed by: INTERNAL MEDICINE

## 2024-07-16 PROCEDURE — 85025 COMPLETE CBC W/AUTO DIFF WBC: CPT

## 2024-07-16 PROCEDURE — 99214 OFFICE O/P EST MOD 30 MIN: CPT | Performed by: INTERNAL MEDICINE

## 2024-07-16 PROCEDURE — 2500000001 HC RX 250 WO HCPCS SELF ADMINISTERED DRUGS (ALT 637 FOR MEDICARE OP): Performed by: INTERNAL MEDICINE

## 2024-07-16 RX ORDER — FAMOTIDINE 10 MG/ML
20 INJECTION INTRAVENOUS ONCE AS NEEDED
Status: DISCONTINUED | OUTPATIENT
Start: 2024-07-16 | End: 2024-07-16 | Stop reason: HOSPADM

## 2024-07-16 RX ORDER — DIPHENHYDRAMINE HYDROCHLORIDE 50 MG/ML
50 INJECTION INTRAMUSCULAR; INTRAVENOUS ONCE
Status: COMPLETED | OUTPATIENT
Start: 2024-07-16 | End: 2024-07-16

## 2024-07-16 RX ORDER — DIPHENHYDRAMINE HYDROCHLORIDE 50 MG/ML
50 INJECTION INTRAMUSCULAR; INTRAVENOUS AS NEEDED
Status: CANCELLED | OUTPATIENT
Start: 2024-07-16

## 2024-07-16 RX ORDER — PALONOSETRON 0.05 MG/ML
0.25 INJECTION, SOLUTION INTRAVENOUS ONCE
Status: COMPLETED | OUTPATIENT
Start: 2024-07-16 | End: 2024-07-16

## 2024-07-16 RX ORDER — ALBUTEROL SULFATE 0.83 MG/ML
3 SOLUTION RESPIRATORY (INHALATION) AS NEEDED
Status: CANCELLED | OUTPATIENT
Start: 2024-07-16

## 2024-07-16 RX ORDER — DIPHENHYDRAMINE HYDROCHLORIDE 50 MG/ML
50 INJECTION INTRAMUSCULAR; INTRAVENOUS AS NEEDED
Status: DISCONTINUED | OUTPATIENT
Start: 2024-07-16 | End: 2024-07-16 | Stop reason: HOSPADM

## 2024-07-16 RX ORDER — PALONOSETRON 0.05 MG/ML
0.25 INJECTION, SOLUTION INTRAVENOUS ONCE
Status: CANCELLED | OUTPATIENT
Start: 2024-07-16

## 2024-07-16 RX ORDER — PROCHLORPERAZINE MALEATE 10 MG
10 TABLET ORAL EVERY 6 HOURS PRN
Status: CANCELLED | OUTPATIENT
Start: 2024-07-16

## 2024-07-16 RX ORDER — EPINEPHRINE 0.3 MG/.3ML
0.3 INJECTION SUBCUTANEOUS EVERY 5 MIN PRN
Status: CANCELLED | OUTPATIENT
Start: 2024-07-16

## 2024-07-16 RX ORDER — DIPHENHYDRAMINE HYDROCHLORIDE 50 MG/ML
50 INJECTION INTRAMUSCULAR; INTRAVENOUS ONCE
Status: CANCELLED | OUTPATIENT
Start: 2024-07-16 | End: 2024-07-16

## 2024-07-16 RX ORDER — FAMOTIDINE 20 MG/1
20 TABLET, FILM COATED ORAL ONCE
Status: CANCELLED | OUTPATIENT
Start: 2024-07-16 | End: 2024-07-16

## 2024-07-16 RX ORDER — PROCHLORPERAZINE EDISYLATE 5 MG/ML
10 INJECTION INTRAMUSCULAR; INTRAVENOUS EVERY 6 HOURS PRN
Status: DISCONTINUED | OUTPATIENT
Start: 2024-07-16 | End: 2024-07-16 | Stop reason: HOSPADM

## 2024-07-16 RX ORDER — FAMOTIDINE 20 MG/1
20 TABLET, FILM COATED ORAL ONCE
Status: COMPLETED | OUTPATIENT
Start: 2024-07-16 | End: 2024-07-16

## 2024-07-16 RX ORDER — PROCHLORPERAZINE EDISYLATE 5 MG/ML
10 INJECTION INTRAMUSCULAR; INTRAVENOUS EVERY 6 HOURS PRN
Status: CANCELLED | OUTPATIENT
Start: 2024-07-16

## 2024-07-16 RX ORDER — PROCHLORPERAZINE MALEATE 10 MG
10 TABLET ORAL EVERY 6 HOURS PRN
Status: DISCONTINUED | OUTPATIENT
Start: 2024-07-16 | End: 2024-07-16 | Stop reason: HOSPADM

## 2024-07-16 RX ORDER — EPINEPHRINE 0.3 MG/.3ML
0.3 INJECTION SUBCUTANEOUS EVERY 5 MIN PRN
Status: DISCONTINUED | OUTPATIENT
Start: 2024-07-16 | End: 2024-07-16 | Stop reason: HOSPADM

## 2024-07-16 RX ORDER — ALBUTEROL SULFATE 0.83 MG/ML
3 SOLUTION RESPIRATORY (INHALATION) AS NEEDED
Status: DISCONTINUED | OUTPATIENT
Start: 2024-07-16 | End: 2024-07-16 | Stop reason: HOSPADM

## 2024-07-16 RX ORDER — FAMOTIDINE 10 MG/ML
20 INJECTION INTRAVENOUS ONCE AS NEEDED
Status: CANCELLED | OUTPATIENT
Start: 2024-07-16

## 2024-07-16 ASSESSMENT — PAIN SCALES - GENERAL: PAINLEVEL: 0-NO PAIN
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
## 2024-07-17 ENCOUNTER — APPOINTMENT (OUTPATIENT)
Dept: HEMATOLOGY/ONCOLOGY | Facility: HOSPITAL | Age: 34
End: 2024-07-17
Payer: COMMERCIAL

## 2024-07-18 ENCOUNTER — HOSPITAL ENCOUNTER (OUTPATIENT)
Dept: RADIATION ONCOLOGY | Facility: HOSPITAL | Age: 34
Setting detail: RADIATION/ONCOLOGY SERIES
Discharge: HOME | End: 2024-07-18
Payer: COMMERCIAL

## 2024-07-18 PROCEDURE — 77293 RESPIRATOR MOTION MGMT SIMUL: CPT | Performed by: RADIOLOGY

## 2024-07-18 PROCEDURE — 77301 RADIOTHERAPY DOSE PLAN IMRT: CPT | Performed by: RADIOLOGY

## 2024-07-18 PROCEDURE — 77300 RADIATION THERAPY DOSE PLAN: CPT | Performed by: RADIOLOGY

## 2024-07-18 PROCEDURE — 77338 DESIGN MLC DEVICE FOR IMRT: CPT | Performed by: RADIOLOGY

## 2024-07-19 ENCOUNTER — TELEPHONE (OUTPATIENT)
Dept: RADIATION ONCOLOGY | Facility: HOSPITAL | Age: 34
End: 2024-07-19
Payer: COMMERCIAL

## 2024-07-29 ENCOUNTER — HOSPITAL ENCOUNTER (OUTPATIENT)
Dept: RADIATION ONCOLOGY | Facility: HOSPITAL | Age: 34
Setting detail: RADIATION/ONCOLOGY SERIES
Discharge: HOME | End: 2024-07-29
Payer: COMMERCIAL

## 2024-07-29 ENCOUNTER — RADIATION ONCOLOGY OTV (OUTPATIENT)
Dept: RADIATION ONCOLOGY | Facility: HOSPITAL | Age: 34
End: 2024-07-29
Payer: COMMERCIAL

## 2024-07-29 VITALS
SYSTOLIC BLOOD PRESSURE: 116 MMHG | OXYGEN SATURATION: 98 % | TEMPERATURE: 97 F | DIASTOLIC BLOOD PRESSURE: 73 MMHG | BODY MASS INDEX: 41.83 KG/M2 | WEIGHT: 227.3 LBS | HEIGHT: 62 IN | HEART RATE: 82 BPM | RESPIRATION RATE: 18 BRPM

## 2024-07-29 DIAGNOSIS — C50.812 MALIGNANT NEOPLASM OF OVERLAPPING SITES OF LEFT FEMALE BREAST (MULTI): ICD-10-CM

## 2024-07-29 DIAGNOSIS — C79.89 SECONDARY MALIGNANT NEOPLASM OF OTHER SPECIFIED SITES (MULTI): ICD-10-CM

## 2024-07-29 DIAGNOSIS — Z51.0 ENCOUNTER FOR ANTINEOPLASTIC RADIATION THERAPY: ICD-10-CM

## 2024-07-29 LAB
RAD ONC MSQ ACTUAL FRACTIONS DELIVERED: 1
RAD ONC MSQ ACTUAL SESSION DELIVERED DOSE: 330 CGRAY
RAD ONC MSQ ACTUAL TOTAL DOSE: 330 CGRAY
RAD ONC MSQ ELAPSED DAYS: 0
RAD ONC MSQ LAST DATE: NORMAL
RAD ONC MSQ PRESCRIBED FRACTIONAL DOSE: 330 CGRAY
RAD ONC MSQ PRESCRIBED NUMBER OF FRACTIONS: 16
RAD ONC MSQ PRESCRIBED TECHNIQUE: NORMAL
RAD ONC MSQ PRESCRIBED TOTAL DOSE: 5280 CGRAY
RAD ONC MSQ PRESCRIPTION PATTERN COMMENT: NORMAL
RAD ONC MSQ START DATE: NORMAL
RAD ONC MSQ TREATMENT COURSE NUMBER: 1
RAD ONC MSQ TREATMENT SITE: NORMAL

## 2024-07-29 PROCEDURE — 77385 HC INTENSITY-MODULATED RADIATION THERAPY (IMRT), SIMPLE: CPT | Performed by: RADIOLOGY

## 2024-07-29 NOTE — PROGRESS NOTES
"Radiation Oncology On Treatment Visit    Patient Name:  Kirsty Shrestha  MRN:  [id number]  :  1990    Referring Provider: No ref. provider found  Primary Care Provider: Caesar Hu MD  Care Team: Patient Care Team:  Caesar Hu MD as PCP - General (Hospitalist)  Roslyn Wheatley PA-C as PCP - [facility] PCP  Julia Carrasquillo MD as Consulting Physician (Hematology and Oncology)  Julia Nguyen RN as Registered Nurse (Reproductive Endocrinology and Infertility)    Date of Service: 2024     Diagnosis:   Specialty Problems          Radiation Oncology Problems    Malignant neoplasm of breast (Multi)        Chest wall recurrence of breast cancer, left (Multi)        Malignant neoplasm of overlapping sites of left breast in female, estrogen receptor positive (Multi)         Treatment Summary:  IMRT: Left Breast with lymph nodes    Treatment Period Technique Fraction Dose Fractions Total Dose   Course 1 2024-2024  (days elapsed: 0)         TB_CW_RNI_L 2024-2024 VMAT 330 / 330 cGy  330 / 5,280 cGy     SUBJECTIVE: Pt is in good spirits and denies any issues at this time.  Patient tolerating radiation treatment without complaint. No new changes. Denies fatigue, swelling, skin changes, pain, itchiness. No other issues this week. Toxicity as noted below    OBJECTIVE:   Vital Signs:  /73   Pulse 82   Temp 36.1 °C (97 °F) (Temporal)   Resp 18   Ht 1.575 m (5' 2\")   Wt 103 kg (227 lb 4.8 oz)   SpO2 98%   BMI 41.57 kg/m²    Pain Scale: The patient's current pain level was assessed.  They report currently having a pain of 0 out of 10.    Other Pertinent Findings:     Toxicity Assessment          2024    09:15   Toxicity Assessment   Adverse Events Reviewed (WDL) Yes (Within Defined Limits)   Treatment Site Breast   Anorexia Grade 0   Anxiety Grade 0   Dehydration Grade 0   Depression Grade 0   Dermatitis Radiation Grade 0   Diarrhea Grade 0   Fatigue Grade 0 "   Fibrosis Deep Connective Tissue Grade 0   Fracture Grade 0   Nausea Grade 0   Pain Grade 0   Treatment Related Secondary Malignancy Grade 0   Tumor Pain Grade 0   Vomiting Grade 0   Abdominal Pain Grade 0   Dysphagia Grade 0   Esophagitis Grade 0   Gastric Hemorrhage Grade 0   Dry Mouth Grade 0   Breast Infection Grade 0   Seroma Grade 0   Joint Range of Motion Decreased Grade 0   Joint Range of Motion Decreased Lumbar Spine Grade 0   Breast Atrophy Grade 0   Breast Pain Grade 0   Nipple Deformity Grade 0   Pneumonitis Grade 0   Edema Limbs Grade 0   Lymphedema Grade 0   Hot Flashes Grade 0        Assessment / Plan:  The patient is tolerating radiation therapy as anticipated.  Continue per current treatment plan.       Jacques Lorenzo MD, PhD  Attending Physician

## 2024-07-30 ENCOUNTER — HOSPITAL ENCOUNTER (OUTPATIENT)
Dept: RADIATION ONCOLOGY | Facility: HOSPITAL | Age: 34
Setting detail: RADIATION/ONCOLOGY SERIES
Discharge: HOME | End: 2024-07-30
Payer: COMMERCIAL

## 2024-07-30 DIAGNOSIS — Z51.0 ENCOUNTER FOR ANTINEOPLASTIC RADIATION THERAPY: ICD-10-CM

## 2024-07-30 DIAGNOSIS — C79.89 SECONDARY MALIGNANT NEOPLASM OF OTHER SPECIFIED SITES (MULTI): ICD-10-CM

## 2024-07-30 DIAGNOSIS — C50.812 MALIGNANT NEOPLASM OF OVERLAPPING SITES OF LEFT FEMALE BREAST (MULTI): ICD-10-CM

## 2024-07-30 LAB
RAD ONC MSQ ACTUAL FRACTIONS DELIVERED: 2
RAD ONC MSQ ACTUAL SESSION DELIVERED DOSE: 330 CGRAY
RAD ONC MSQ ACTUAL TOTAL DOSE: 660 CGRAY
RAD ONC MSQ ELAPSED DAYS: 1
RAD ONC MSQ LAST DATE: NORMAL
RAD ONC MSQ PRESCRIBED FRACTIONAL DOSE: 330 CGRAY
RAD ONC MSQ PRESCRIBED NUMBER OF FRACTIONS: 16
RAD ONC MSQ PRESCRIBED TECHNIQUE: NORMAL
RAD ONC MSQ PRESCRIBED TOTAL DOSE: 5280 CGRAY
RAD ONC MSQ PRESCRIPTION PATTERN COMMENT: NORMAL
RAD ONC MSQ START DATE: NORMAL
RAD ONC MSQ TREATMENT COURSE NUMBER: 1
RAD ONC MSQ TREATMENT SITE: NORMAL

## 2024-07-30 PROCEDURE — 77385 HC INTENSITY-MODULATED RADIATION THERAPY (IMRT), SIMPLE: CPT | Performed by: RADIOLOGY

## 2024-07-31 ENCOUNTER — HOSPITAL ENCOUNTER (OUTPATIENT)
Dept: RADIATION ONCOLOGY | Facility: HOSPITAL | Age: 34
Setting detail: RADIATION/ONCOLOGY SERIES
Discharge: HOME | End: 2024-07-31
Payer: COMMERCIAL

## 2024-07-31 DIAGNOSIS — Z51.0 ENCOUNTER FOR ANTINEOPLASTIC RADIATION THERAPY: ICD-10-CM

## 2024-07-31 DIAGNOSIS — C79.89 SECONDARY MALIGNANT NEOPLASM OF OTHER SPECIFIED SITES (MULTI): ICD-10-CM

## 2024-07-31 DIAGNOSIS — C50.812 MALIGNANT NEOPLASM OF OVERLAPPING SITES OF LEFT FEMALE BREAST (MULTI): ICD-10-CM

## 2024-07-31 LAB
RAD ONC MSQ ACTUAL FRACTIONS DELIVERED: 3
RAD ONC MSQ ACTUAL SESSION DELIVERED DOSE: 330 CGRAY
RAD ONC MSQ ACTUAL TOTAL DOSE: 990 CGRAY
RAD ONC MSQ ELAPSED DAYS: 2
RAD ONC MSQ LAST DATE: NORMAL
RAD ONC MSQ PRESCRIBED FRACTIONAL DOSE: 330 CGRAY
RAD ONC MSQ PRESCRIBED NUMBER OF FRACTIONS: 16
RAD ONC MSQ PRESCRIBED TECHNIQUE: NORMAL
RAD ONC MSQ PRESCRIBED TOTAL DOSE: 5280 CGRAY
RAD ONC MSQ PRESCRIPTION PATTERN COMMENT: NORMAL
RAD ONC MSQ START DATE: NORMAL
RAD ONC MSQ TREATMENT COURSE NUMBER: 1
RAD ONC MSQ TREATMENT SITE: NORMAL

## 2024-07-31 PROCEDURE — 77336 RADIATION PHYSICS CONSULT: CPT | Performed by: RADIOLOGY

## 2024-07-31 PROCEDURE — 77385 HC INTENSITY-MODULATED RADIATION THERAPY (IMRT), SIMPLE: CPT | Performed by: RADIOLOGY

## 2024-08-01 ENCOUNTER — HOSPITAL ENCOUNTER (OUTPATIENT)
Dept: RADIATION ONCOLOGY | Facility: HOSPITAL | Age: 34
Setting detail: RADIATION/ONCOLOGY SERIES
Discharge: HOME | End: 2024-08-01
Payer: COMMERCIAL

## 2024-08-01 DIAGNOSIS — Z51.0 ENCOUNTER FOR ANTINEOPLASTIC RADIATION THERAPY: ICD-10-CM

## 2024-08-01 DIAGNOSIS — C79.89 SECONDARY MALIGNANT NEOPLASM OF OTHER SPECIFIED SITES (MULTI): ICD-10-CM

## 2024-08-01 DIAGNOSIS — C50.812 MALIGNANT NEOPLASM OF OVERLAPPING SITES OF LEFT FEMALE BREAST (MULTI): ICD-10-CM

## 2024-08-01 LAB
RAD ONC MSQ ACTUAL FRACTIONS DELIVERED: 4
RAD ONC MSQ ACTUAL SESSION DELIVERED DOSE: 330 CGRAY
RAD ONC MSQ ACTUAL TOTAL DOSE: 1320 CGRAY
RAD ONC MSQ ELAPSED DAYS: 3
RAD ONC MSQ LAST DATE: NORMAL
RAD ONC MSQ PRESCRIBED FRACTIONAL DOSE: 330 CGRAY
RAD ONC MSQ PRESCRIBED NUMBER OF FRACTIONS: 16
RAD ONC MSQ PRESCRIBED TECHNIQUE: NORMAL
RAD ONC MSQ PRESCRIBED TOTAL DOSE: 5280 CGRAY
RAD ONC MSQ PRESCRIPTION PATTERN COMMENT: NORMAL
RAD ONC MSQ START DATE: NORMAL
RAD ONC MSQ TREATMENT COURSE NUMBER: 1
RAD ONC MSQ TREATMENT SITE: NORMAL

## 2024-08-01 PROCEDURE — 77385 HC INTENSITY-MODULATED RADIATION THERAPY (IMRT), SIMPLE: CPT | Performed by: RADIOLOGY

## 2024-08-02 ENCOUNTER — HOSPITAL ENCOUNTER (OUTPATIENT)
Dept: RADIATION ONCOLOGY | Facility: HOSPITAL | Age: 34
Setting detail: RADIATION/ONCOLOGY SERIES
Discharge: HOME | End: 2024-08-02
Payer: COMMERCIAL

## 2024-08-02 DIAGNOSIS — C79.89 SECONDARY MALIGNANT NEOPLASM OF OTHER SPECIFIED SITES (MULTI): ICD-10-CM

## 2024-08-02 DIAGNOSIS — C50.812 MALIGNANT NEOPLASM OF OVERLAPPING SITES OF LEFT FEMALE BREAST (MULTI): ICD-10-CM

## 2024-08-02 DIAGNOSIS — Z51.0 ENCOUNTER FOR ANTINEOPLASTIC RADIATION THERAPY: ICD-10-CM

## 2024-08-02 LAB
RAD ONC MSQ ACTUAL FRACTIONS DELIVERED: 5
RAD ONC MSQ ACTUAL SESSION DELIVERED DOSE: 330 CGRAY
RAD ONC MSQ ACTUAL TOTAL DOSE: 1650 CGRAY
RAD ONC MSQ ELAPSED DAYS: 4
RAD ONC MSQ LAST DATE: NORMAL
RAD ONC MSQ PRESCRIBED FRACTIONAL DOSE: 330 CGRAY
RAD ONC MSQ PRESCRIBED NUMBER OF FRACTIONS: 16
RAD ONC MSQ PRESCRIBED TECHNIQUE: NORMAL
RAD ONC MSQ PRESCRIBED TOTAL DOSE: 5280 CGRAY
RAD ONC MSQ PRESCRIPTION PATTERN COMMENT: NORMAL
RAD ONC MSQ START DATE: NORMAL
RAD ONC MSQ TREATMENT COURSE NUMBER: 1
RAD ONC MSQ TREATMENT SITE: NORMAL

## 2024-08-02 PROCEDURE — 77385 HC INTENSITY-MODULATED RADIATION THERAPY (IMRT), SIMPLE: CPT | Performed by: RADIOLOGY

## 2024-08-05 ENCOUNTER — RADIATION ONCOLOGY OTV (OUTPATIENT)
Dept: RADIATION ONCOLOGY | Facility: HOSPITAL | Age: 34
End: 2024-08-05

## 2024-08-05 ENCOUNTER — OFFICE VISIT (OUTPATIENT)
Dept: HEMATOLOGY/ONCOLOGY | Facility: HOSPITAL | Age: 34
End: 2024-08-05
Payer: COMMERCIAL

## 2024-08-05 ENCOUNTER — HOSPITAL ENCOUNTER (OUTPATIENT)
Dept: RADIATION ONCOLOGY | Facility: HOSPITAL | Age: 34
Setting detail: RADIATION/ONCOLOGY SERIES
Discharge: HOME | End: 2024-08-05
Payer: COMMERCIAL

## 2024-08-05 VITALS
OXYGEN SATURATION: 97 % | TEMPERATURE: 97 F | RESPIRATION RATE: 18 BRPM | DIASTOLIC BLOOD PRESSURE: 73 MMHG | WEIGHT: 228.3 LBS | HEART RATE: 83 BPM | SYSTOLIC BLOOD PRESSURE: 119 MMHG | BODY MASS INDEX: 41.76 KG/M2

## 2024-08-05 DIAGNOSIS — Z17.0 MALIGNANT NEOPLASM OF LEFT BREAST IN FEMALE, ESTROGEN RECEPTOR POSITIVE, UNSPECIFIED SITE OF BREAST (MULTI): Primary | ICD-10-CM

## 2024-08-05 DIAGNOSIS — Z31.62 ENCOUNTER FOR FERTILITY PRESERVATION COUNSELING: ICD-10-CM

## 2024-08-05 DIAGNOSIS — Z51.0 ENCOUNTER FOR ANTINEOPLASTIC RADIATION THERAPY: ICD-10-CM

## 2024-08-05 DIAGNOSIS — C50.912 MALIGNANT NEOPLASM OF LEFT BREAST IN FEMALE, ESTROGEN RECEPTOR POSITIVE, UNSPECIFIED SITE OF BREAST (MULTI): Primary | ICD-10-CM

## 2024-08-05 DIAGNOSIS — C79.89 SECONDARY MALIGNANT NEOPLASM OF OTHER SPECIFIED SITES (MULTI): ICD-10-CM

## 2024-08-05 DIAGNOSIS — C50.812 MALIGNANT NEOPLASM OF OVERLAPPING SITES OF LEFT FEMALE BREAST (MULTI): ICD-10-CM

## 2024-08-05 DIAGNOSIS — Z91.89 AT RISK FOR INFERTILITY: ICD-10-CM

## 2024-08-05 LAB
RAD ONC MSQ ACTUAL FRACTIONS DELIVERED: 6
RAD ONC MSQ ACTUAL SESSION DELIVERED DOSE: 330 CGRAY
RAD ONC MSQ ACTUAL TOTAL DOSE: 1980 CGRAY
RAD ONC MSQ ELAPSED DAYS: 7
RAD ONC MSQ LAST DATE: NORMAL
RAD ONC MSQ PRESCRIBED FRACTIONAL DOSE: 330 CGRAY
RAD ONC MSQ PRESCRIBED NUMBER OF FRACTIONS: 16
RAD ONC MSQ PRESCRIBED TECHNIQUE: NORMAL
RAD ONC MSQ PRESCRIBED TOTAL DOSE: 5280 CGRAY
RAD ONC MSQ PRESCRIPTION PATTERN COMMENT: NORMAL
RAD ONC MSQ START DATE: NORMAL
RAD ONC MSQ TREATMENT COURSE NUMBER: 1
RAD ONC MSQ TREATMENT SITE: NORMAL

## 2024-08-05 PROCEDURE — 99215 OFFICE O/P EST HI 40 MIN: CPT | Performed by: NURSE PRACTITIONER

## 2024-08-05 PROCEDURE — 77385 HC INTENSITY-MODULATED RADIATION THERAPY (IMRT), SIMPLE: CPT | Performed by: RADIOLOGY

## 2024-08-05 ASSESSMENT — PAIN SCALES - GENERAL: PAINLEVEL: 0-NO PAIN

## 2024-08-05 ASSESSMENT — ENCOUNTER SYMPTOMS
CHILLS: 0
FATIGUE: 0
CONFUSION: 0
FEVER: 0
DEPRESSION: 0
NERVOUS/ANXIOUS: 0

## 2024-08-05 NOTE — PROGRESS NOTES
Radiation Oncology On Treatment Visit    Patient Name:  Kirsty Shrestha  MRN:  [id number]  :  1990    Referring Provider: No ref. provider found  Primary Care Provider: Caesar Hu MD  Care Team: Patient Care Team:  Caesar Hu MD as PCP - General (Hospitalist)  Roslyn Wheatley PA-C as PCP - [facility] PCP  Julia Carrasquillo MD as Consulting Physician (Hematology and Oncology)  Julia Nguyen RN as Registered Nurse (Reproductive Endocrinology and Infertility)    Date of Service: 2024     Diagnosis:   Specialty Problems          Radiation Oncology Problems    Malignant neoplasm of breast (Multi)        Chest wall recurrence of breast cancer, left (Multi)        Malignant neoplasm of overlapping sites of left breast in female, estrogen receptor positive (Multi)         Treatment Summary:  IMRT: Left Breast with lymph nodes    Treatment Period Technique Fraction Dose Fractions Total Dose   Course 1 2024-2024  (days elapsed: 7)         TB_CW_RNI_L 2024-2024 VMAT 330 / 330 cGy 1980,280 cGy     SUBJECTIVE: Pt denies any issues other than some mild fatigue and hot  flashes. Pt notes some watery eyes as well.      OBJECTIVE:   Vital Signs:  /73   Pulse 83   Temp 36.1 °C (97 °F) (Temporal)   Resp 18   Wt 104 kg (228 lb 4.8 oz)   SpO2 97%   BMI 41.76 kg/m²    Pain Scale: The patient's current pain level was assessed.  They report currently having a pain of 0flashes out of 10.    Other Pertinent Findings:     Toxicity Assessment          2024    09:15 2024    09:49   Toxicity Assessment   Adverse Events Reviewed (WDL) Yes (Within Defined Limits) Yes (Within Defined Limits)   Treatment Site Breast Breast   Anorexia Grade 0 Grade 0   Anxiety Grade 0 Grade 0   Dehydration Grade 0 Grade 0   Depression Grade 0 Grade 0   Dermatitis Radiation Grade 0 Grade 0   Diarrhea Grade 0 Grade 0   Fatigue Grade 0 Grade 0   Fibrosis Deep Connective Tissue Grade 0 Grade 0    Fracture Grade 0 Grade 0   Nausea Grade 0 Grade 0   Pain Grade 0 Grade 0   Treatment Related Secondary Malignancy Grade 0 Grade 0   Tumor Pain Grade 0 Grade 0   Vomiting Grade 0 Grade 0   Abdominal Pain Grade 0    Dysphagia Grade 0 Grade 0   Esophagitis Grade 0 Grade 0   Gastric Hemorrhage Grade 0    Mucositis Oral  Grade 0   Dry Mouth Grade 0 Grade 0   Breast Infection Grade 0 Grade 0   Seroma Grade 0    Joint Range of Motion Decreased Grade 0 Grade 0   Joint Range of Motion Decreased Lumbar Spine Grade 0 Grade 0   Breast Atrophy Grade 0 Grade 0   Breast Pain Grade 0 Grade 0   Nipple Deformity Grade 0 Grade 0   Pneumonitis Grade 0 Grade 0   Edema Limbs Grade 0 Grade 0   Lymphedema Grade 0 Grade 0   Thromboembolic Event  Grade 0   Hot Flashes Grade 0 Grade 1       OCCASIONAL HOT FLASH        Assessment / Plan:  The patient is tolerating radiation therapy as anticipated.  Continue per current treatment plan.       Jcaques Lorenzo MD, PhD  Attending Physician

## 2024-08-05 NOTE — PROGRESS NOTES
Patient ID: Kirsty Shrestha is a 34 y.o. female.  Referring Physician: Kari Zhao, APRN-CNP  [id number] [place] [address]  Primary Care Provider: Caesar Hu MD  Oncologic History:  4/8/22: Dx MG/US performed. A 1.3 x 0.6 x 0.7 cm hypoechoic mass seen in left breast (11:00, 13 cm FN). An additional 0.9 cm mass at 2:00 (8 cm FN) was also seen, possibly benign. ALN’s appeared unremarkable.  4/20/22: Left breast mass biopsy (11:00) showed IDC, grade 3. ER >95%, NE >95%, Her2-negative (2+ IHC; MAHNAZ ratio 1.9, copy #3.6). Mammaprint high-risk (-0.615), luminal B type. Maximum diameter 0.7 cm.  4/28/22: Biopsy of 2:00 lesion benign. Left ALN negative.  Genetic Testing completed. Negative  5/23/2023 bilateral stage 2 breast recosntruction with removal of TE and placement of new IDEA: saline implants 610cc with autologous fat grafting of 160cc per breast and abdominoplasty with rectus plication and umbilical transposition   7/14/22: Bilateral mastectomy with implant reconstruction and left SLNBx performed. No residual carcinoma seen in left breast. Right breast benign. 0/1 left SLN’s involved. pT1bN0 (IA).  08/18/2022: Started on Tamoxifen 20 mg daily by Dr. Connell. Patient did not follow-up with medical oncology after that and stopped Tamoxifen after her prescription run out  12/22/2023: Patient underwent an US-guided left breast biopsy of a cystic mass at 12:00. Pathology was consistent with Invasive ductal carcinoma, grade 3; ER greater than 95%, NE greater than 95%, HER2 negative   1/31/2024: Patient underwent left breast lumpectomy/wide excision of breast cancer and SLNB. Pathology revealed multifocal disease with 4 separate nodules measuring 32, 11, 7 and 4 mm respectively with 0/1 SLNs involved. With invasive cancer present at lateral margin, within microns of posterior margin and <1mm of anterior and medial margins. Repeat receptor of mass at 12:00 showed Estrogen Receptor positive at 95%,  Progesteron receptor at >95% and her2 equivocal and not amplified at 1.1    02/26/2024: Patient underwent re-excision  04/16/2024: oocyte retrieval, 8 mature oocytes cryopreserved  04/23/2024: Cycle #1 of TC attempted and discontinued due to hypersensitivity reaction  07/16/2024: Received the last cycle of TC  07/29/2024: Began chest irradiation    Subjective    Ms. Shrestha is a pleasant 34 y.o. premenopausal AA female with prior Stage: IA (pT1b pN0) invasive ductal carcinoma of the left breast; ER >95%, DE >95%, Her2-negative, Mammaprint: High-risk, luminal B, -0.615. Now with locally recurrent left invasive ductal carcinoma, stage IB (rPT2, pN0 M0).  The patient's recurrent breast cancer was diagnosed on December 22, 2023, and is grade 3, Estrogen Receptor positive at 95%, Progesteron receptor at >95% and her2 equivocal.    Returns today in follow up regarding issues unique to young adults with cancer.     Overall she relays doing well, began radiation therapy last week. Physically she notes symptoms of fatigue and hot flashes. Had thought she would be able to get back to working out but went to class at [employer] and felt pre-syncopal. Difficulty sleeping d/t hot flashes. Hot flashes occur multiple times throughout the day, worse at night, lasting for minutes and resolving on own. Alcohol, feeling frustrated or angry triggers hot flashes. Sleeping in layers with air conditioning, with fan on.     Appetite is good, intermittent/sporadic nausea with emesis she thinks may be related to eating heavier foods like beef/pork. Is eating well-balanced diet, incorporating fruits/vegetables, whole grains, is drinking adequate water. Does not drink alcohol, no tobacco, no marijuana, no vaping.     Is sexually active with male partner, denies vaginal dryness, mild vaginal discomfort following sex. Denies sexual dysfunction, no decrease in interest. Remains on contraception (IUD) for pregnancy prevention.     Emotionally,  she relays doing well feels excited to be completed with therapy. Appreciative of the slower pace that experiencing cancer has provided in her life. Supportive family and friends. No concerns with anxiety, depression. She/daughter remain active with [facility]. Feels daughter is well-supported, has not noticed any behavior changes.    Social History: Lives in [place] with daughter 12 y.o. Not currently working, previously worked in construction. In a relationship. Grew up in the area and has family nearby. Does not smoke, vape, or use recreational drugs. Previously drank 5 drinks/week. Now not using ETOH with chemotherapy.   Review of Systems   Constitutional:  Negative for chills, fatigue and fever.   Psychiatric/Behavioral:  Negative for confusion and depression. The patient is not nervous/anxious.       Objective   BSA: There is no height or weight on file to calculate BSA.  There were no vitals taken for this visit.  Performance Status:  Asymptomatic    Current Outpatient Medications   Medication Instructions    dexAMETHasone (DECADRON) 8 mg, oral, 2 times daily, Take 2 tablets 2 times a day x 3 days, starting 24 hours before chemotherapy    fexofenadine-pseudoephedrine (Allegra-D 24) 180-240 mg 24 hr tablet 1 tablet, oral, Daily, Do not crush, chew, or split.    levonorgestrel (Mirena) 21 mcg/24 hours (8 yrs) 52 mg IUD 1 each, intrauterine, Daily RT     No family history on file.  Oncology History   Malignant neoplasm of breast (Multi)   11/28/2022 Initial Diagnosis    Malignant neoplasm of breast (CMS/HCC)     4/23/2024 -  Chemotherapy    TC (DOCEtaxel / Cyclophosphamide), 21 Day Cycles     Chest wall recurrence of breast cancer, left (Multi)   1/12/2024 Initial Diagnosis    Chest wall recurrence of breast cancer, left (CMS/HCC)     1/31/2024 Cancer Staged    Staging form: Breast, AJCC 8th Edition, Pathologic stage from 1/31/2024: Stage IB (rpT2, pN0(sn), cM0, G3, ER+, RI+, HER2-) - Signed by  Julia Carrasquillo MD on 2/27/2024       Kirsty Shrestha  reports that she has never smoked. She has never been exposed to tobacco smoke. She has never used smokeless tobacco.  She  reports current alcohol use.  She  reports no history of drug use.    Physical Exam  Constitutional:       General: She is not in acute distress.     Appearance: Normal appearance. She is not ill-appearing.   Neurological:      General: No focal deficit present.      Mental Status: She is alert and oriented to person, place, and time.      Motor: No weakness.      Gait: Gait normal.   Psychiatric:         Mood and Affect: Mood normal.         Behavior: Behavior normal.         Thought Content: Thought content normal.         Judgment: Judgment normal.       Hospital Outpatient Visit on 08/05/2024   Component Date Value Ref Range Status    Treatment Site 08/05/2024 [email or web address]   Final    Course Number 08/05/2024 1   Final    Prescribed Fractional Dose 08/05/2024 330  cGray Final    Prescribed Total Dose 08/05/2024 5,280  cGray Final    Actual Fractions Delivered 08/05/2024 6   Final    Prescription Pattern Comment 08/05/2024 daily CBCT, align breast, daily ABC   Final    Actual Session Delivered Dose 08/05/2024 330  cGray Final    Actual Total Dose 08/05/2024 1,980  cGray Final    Prescribed Technique 08/05/2024 VMAT   Final    Elapsed Days 08/05/2024 7   Final    Start Date 08/05/2024 7/29/2024   Final    Last Date 08/05/2024 8/5/2024   Final    Prescribed Number of Fractions 08/05/2024 16   Final   Hospital Outpatient Visit on 08/02/2024   Component Date Value Ref Range Status    Treatment Site 08/02/2024 [email or web address]   Final    Course Number 08/02/2024 1   Final    Prescribed Fractional Dose 08/02/2024 330  cGray Final    Prescribed Total Dose 08/02/2024 5,280  cGray Final    Actual Fractions Delivered 08/02/2024 5   Final    Prescription Pattern Comment 08/02/2024 daily CBCT, align breast, daily ABC   Final    Actual Session Delivered  Dose 08/02/2024 330  cGray Final    Actual Total Dose 08/02/2024 1,650  cGray Final    Prescribed Technique 08/02/2024 VMAT   Final    Elapsed Days 08/02/2024 4   Final    Start Date 08/02/2024 7/29/2024   Final    Last Date 08/02/2024 8/2/2024   Final    Prescribed Number of Fractions 08/02/2024 16   Final   Hospital Outpatient Visit on 08/01/2024   Component Date Value Ref Range Status    Treatment Site 08/01/2024 [email or web address]   Final    Course Number 08/01/2024 1   Final    Prescribed Fractional Dose 08/01/2024 330  cGray Final    Prescribed Total Dose 08/01/2024 5,280  cGray Final    Actual Fractions Delivered 08/01/2024 4   Final    Prescription Pattern Comment 08/01/2024 daily CBCT, align breast, daily ABC   Final    Actual Session Delivered Dose 08/01/2024 330  cGray Final    Actual Total Dose 08/01/2024 1,320  cGray Final    Prescribed Technique 08/01/2024 VMAT   Final    Elapsed Days 08/01/2024 3   Final    Start Date 08/01/2024 7/29/2024   Final    Last Date 08/01/2024 8/1/2024   Final    Prescribed Number of Fractions 08/01/2024 16   Final   Hospital Outpatient Visit on 07/31/2024   Component Date Value Ref Range Status    Treatment Site 07/31/2024 [email or web address]   Final    Course Number 07/31/2024 1   Final    Prescribed Fractional Dose 07/31/2024 330  cGray Final    Prescribed Total Dose 07/31/2024 5,280  cGray Final    Actual Fractions Delivered 07/31/2024 3   Final    Prescription Pattern Comment 07/31/2024 daily CBCT, align breast, daily ABC   Final    Actual Session Delivered Dose 07/31/2024 330  cGray Final    Actual Total Dose 07/31/2024 990  cGray Final    Prescribed Technique 07/31/2024 VMAT   Final    Elapsed Days 07/31/2024 2   Final    Start Date 07/31/2024 7/29/2024   Final    Last Date 07/31/2024 7/31/2024   Final    Prescribed Number of Fractions 07/31/2024 16   Final   Hospital Outpatient Visit on 07/30/2024   Component Date Value Ref Range Status    Treatment Site 07/30/2024 [email or web address]    Final    Course Number 07/30/2024 1   Final    Prescribed Fractional Dose 07/30/2024 330  cGray Final    Prescribed Total Dose 07/30/2024 5,280  cGray Final    Actual Fractions Delivered 07/30/2024 2   Final    Prescription Pattern Comment 07/30/2024 daily CBCT, align breast, daily ABC   Final    Actual Session Delivered Dose 07/30/2024 330  cGray Final    Actual Total Dose 07/30/2024 660  cGray Final    Prescribed Technique 07/30/2024 VMAT   Final    Elapsed Days 07/30/2024 1   Final    Start Date 07/30/2024 7/29/2024   Final    Last Date 07/30/2024 7/30/2024   Final    Prescribed Number of Fractions 07/30/2024 16   Final        Assessment/Plan    Kirsty Shrestha is a 34 y.o. F with prior Stage: IA (pT1b pN0) invasive ductal carcinoma of the left breast; ER >95%, NH >95%, Her2-negative, s/p bilateral mastectomies but lost to follow up on oral endocrine therapy. Represented in December with locally recurrent left invasive ductal carcinoma, stage IB (rPT2, pN0 M0) ER/NH+ HER2 equivocal. S/p L lumpectomy with wide excision 01/2024 followed by re-excision 02/2024 d/t positive margins. S/p chemotherapy with docetaxol and cyclophosphamide x 4 cycles, currently receiving chest irradiation, and oral endocrine therapy.     #Psychosocial: young adult and parent with cancer  - Connected to [facility] and engaged in programs  - Receiving young adult social meet up invitations via [employer]  - Provided patient with resource for [employer]  - Provided patient with resource [employer]  - Connected to [facility] in breast program    #Hot flashes: due to ovarian suppression/low circulating estrogen  - Continue supportive interventions, dressing in layers, fan at night, avoiding triggers  - Discussed medication management with SSRI/SNRI or gabapentinoids -- she will think about this     #Sexual Dysfunction/Contraception:  - Have previously discussed the seriousness of getting pregnant while receiving  chemotherapy due to the harmful effects this could have on the  fetus, and on her cancer treatment given the decreased availability of medical  services.   - Currently has mirena IUD in place -- safe form of contraception for breast cancer with hormone sensitivity  - Mild symptoms of dysparaneuria - instructed to use topical lubricant with sex - silicone or water based and monitor  - We discussed additional interventions including vaginal moisturizer and/or topical vaginal estrogen  - She was encouraged to reach out if she experiences any concerns regarding intimacy    #Diet/Exercise/Healthy Lifestyle:  - Previously discussed research demonstrating consumption of a healthy, plant based diet not only decreases cancer risk, but also has been found to improve survival in cancer patients who partake in a healthy diet.   - Previously reviewed the American Cancer Society guidelines for a healthy diet including mostly plant based foods  with incorporation of plant/lean animal proteins and healthy fats such as the mediterranean diet.   - Monitor dietary intake, nausea/emesis could be from intake of high fat foods  - Previously discussed research that demonstrates decreased cancer risk and improved survival in cancer patients who exercise and stay active throughout diagnosis and treatment.  - Encouraged patient to begin gentle exercise as tolerated with low impact activities such as walking, bicycling, or lifting light weights -- this may help with cancer related fatigue  - Encouraged patient to continue to abstain from alcohol, tobacco, and recreational drugs    #Risk for infertility:  - Previously discussed the damaging effect cancer treatment can have on the ovaries.   - Previously discussed natural age related decline in fertility and menopause that occurs as a result of ovarian aging, and that cancer treatments can accellerate that progression and diminish ovarian reserve.  - Individuals may experience varying  degrees of short or long term ovarian dysfunction and amenorrhea, and that this is dependent upon type of cancer treatment, cumulative dose, and patients age.   - Loss of ovarian function may be permanent or temporary.  - Amenorrhea may be temporary or permanent -- temporary amenorrhea results from destruction of maturing follicles whereas permanent amenorrhea results from depletion of viable primordial follicles.  - Risk to fertility is INTERMEDIATE based on cancer treatment of Cyclophosphamide at doses ~5000mg  - Baseline AMH testing 03.08.24 1.25  - Underwent ovarian stimulation and oocyte retrieval 04/16 retrieved 8 mature oocytes -- at [email or web address] in long term storage  - Discussed the POSITIVE trial findings and how this may provide us with some guidance, however in her specific scenario with recurrence it is unclear whether a pause from endocrine therapy would be safe given the lack of data in patients with recurrent cancer  - Discussed plan to work with her medical oncology team to determine safe time frame for her to consider family building  - Discussed role of ovarian suppression -- suppresses menstrual bleeding, and may have benefit of protecting ovarian function -- on goserelin     #Genetic Risk: young adult with cancer and personal family history of cancer  - Discussed rationale for genetic risk consultation -- will address at follow up and place a referral  - Discussed small percentage ~5% of all cancers attributed to inheritable mutation that predisposes an individual for increased cancer risk, genetic risk consultation will perform family pedigree and blood or tissue typing to analyze patient DNA for any identifiable mutations  - Should he have an identified mutation this would not change his current treatment plan, but may have implications for earlier or more frequent screenings for other types of cancer, additionally siblings and offspring may also be offered genetic screening      Follow up in 3  months                Kari Zhao, APRN-CNP

## 2024-08-06 ENCOUNTER — APPOINTMENT (OUTPATIENT)
Dept: RADIATION ONCOLOGY | Facility: CLINIC | Age: 34
End: 2024-08-06
Payer: COMMERCIAL

## 2024-08-06 ENCOUNTER — APPOINTMENT (OUTPATIENT)
Dept: HEMATOLOGY/ONCOLOGY | Facility: HOSPITAL | Age: 34
End: 2024-08-06
Payer: COMMERCIAL

## 2024-08-06 ENCOUNTER — APPOINTMENT (OUTPATIENT)
Dept: RADIATION ONCOLOGY | Facility: HOSPITAL | Age: 34
End: 2024-08-06
Payer: COMMERCIAL

## 2024-08-07 ENCOUNTER — INFUSION (OUTPATIENT)
Dept: HEMATOLOGY/ONCOLOGY | Facility: HOSPITAL | Age: 34
End: 2024-08-07
Payer: COMMERCIAL

## 2024-08-07 ENCOUNTER — HOSPITAL ENCOUNTER (OUTPATIENT)
Dept: RADIATION ONCOLOGY | Facility: HOSPITAL | Age: 34
Setting detail: RADIATION/ONCOLOGY SERIES
Discharge: HOME | End: 2024-08-07
Payer: COMMERCIAL

## 2024-08-07 VITALS
TEMPERATURE: 97.3 F | HEIGHT: 62 IN | BODY MASS INDEX: 42.49 KG/M2 | WEIGHT: 230.9 LBS | OXYGEN SATURATION: 100 % | DIASTOLIC BLOOD PRESSURE: 79 MMHG | HEART RATE: 72 BPM | SYSTOLIC BLOOD PRESSURE: 135 MMHG | RESPIRATION RATE: 17 BRPM

## 2024-08-07 DIAGNOSIS — Z51.0 ENCOUNTER FOR ANTINEOPLASTIC RADIATION THERAPY: ICD-10-CM

## 2024-08-07 DIAGNOSIS — Z17.0 MALIGNANT NEOPLASM OF OVERLAPPING SITES OF LEFT BREAST IN FEMALE, ESTROGEN RECEPTOR POSITIVE (MULTI): ICD-10-CM

## 2024-08-07 DIAGNOSIS — C50.812 MALIGNANT NEOPLASM OF OVERLAPPING SITES OF LEFT FEMALE BREAST (MULTI): ICD-10-CM

## 2024-08-07 DIAGNOSIS — C79.89 SECONDARY MALIGNANT NEOPLASM OF OTHER SPECIFIED SITES (MULTI): ICD-10-CM

## 2024-08-07 DIAGNOSIS — C50.812 MALIGNANT NEOPLASM OF OVERLAPPING SITES OF LEFT BREAST IN FEMALE, ESTROGEN RECEPTOR POSITIVE (MULTI): ICD-10-CM

## 2024-08-07 LAB
RAD ONC MSQ ACTUAL FRACTIONS DELIVERED: 7
RAD ONC MSQ ACTUAL SESSION DELIVERED DOSE: 330 CGRAY
RAD ONC MSQ ACTUAL TOTAL DOSE: 2310 CGRAY
RAD ONC MSQ ELAPSED DAYS: 9
RAD ONC MSQ LAST DATE: NORMAL
RAD ONC MSQ PRESCRIBED FRACTIONAL DOSE: 330 CGRAY
RAD ONC MSQ PRESCRIBED NUMBER OF FRACTIONS: 16
RAD ONC MSQ PRESCRIBED TECHNIQUE: NORMAL
RAD ONC MSQ PRESCRIBED TOTAL DOSE: 5280 CGRAY
RAD ONC MSQ PRESCRIPTION PATTERN COMMENT: NORMAL
RAD ONC MSQ START DATE: NORMAL
RAD ONC MSQ TREATMENT COURSE NUMBER: 1
RAD ONC MSQ TREATMENT SITE: NORMAL

## 2024-08-07 PROCEDURE — 2500000004 HC RX 250 GENERAL PHARMACY W/ HCPCS (ALT 636 FOR OP/ED): Mod: JZ | Performed by: INTERNAL MEDICINE

## 2024-08-07 PROCEDURE — 77336 RADIATION PHYSICS CONSULT: CPT | Performed by: RADIOLOGY

## 2024-08-07 PROCEDURE — 2500000005 HC RX 250 GENERAL PHARMACY W/O HCPCS: Performed by: INTERNAL MEDICINE

## 2024-08-07 PROCEDURE — 96402 CHEMO HORMON ANTINEOPL SQ/IM: CPT

## 2024-08-07 PROCEDURE — 77385 HC INTENSITY-MODULATED RADIATION THERAPY (IMRT), SIMPLE: CPT | Performed by: RADIOLOGY

## 2024-08-07 RX ORDER — EPINEPHRINE 0.3 MG/.3ML
0.3 INJECTION SUBCUTANEOUS EVERY 5 MIN PRN
Status: DISCONTINUED | OUTPATIENT
Start: 2024-08-07 | End: 2024-08-07 | Stop reason: HOSPADM

## 2024-08-07 RX ORDER — FAMOTIDINE 10 MG/ML
20 INJECTION INTRAVENOUS ONCE AS NEEDED
Status: DISCONTINUED | OUTPATIENT
Start: 2024-08-07 | End: 2024-08-07 | Stop reason: HOSPADM

## 2024-08-07 RX ORDER — DIPHENHYDRAMINE HYDROCHLORIDE 50 MG/ML
50 INJECTION INTRAMUSCULAR; INTRAVENOUS AS NEEDED
Status: DISCONTINUED | OUTPATIENT
Start: 2024-08-07 | End: 2024-08-07 | Stop reason: HOSPADM

## 2024-08-07 RX ORDER — LIDOCAINE HYDROCHLORIDE 10 MG/ML
2 INJECTION, SOLUTION EPIDURAL; INFILTRATION; INTRACAUDAL; PERINEURAL ONCE
Status: COMPLETED | OUTPATIENT
Start: 2024-08-07 | End: 2024-08-07

## 2024-08-07 RX ORDER — ALBUTEROL SULFATE 0.83 MG/ML
3 SOLUTION RESPIRATORY (INHALATION) AS NEEDED
OUTPATIENT
Start: 2024-09-03

## 2024-08-07 RX ORDER — LIDOCAINE HYDROCHLORIDE 10 MG/ML
2 INJECTION, SOLUTION EPIDURAL; INFILTRATION; INTRACAUDAL; PERINEURAL ONCE
OUTPATIENT
Start: 2024-09-03

## 2024-08-07 RX ORDER — DIPHENHYDRAMINE HYDROCHLORIDE 50 MG/ML
50 INJECTION INTRAMUSCULAR; INTRAVENOUS AS NEEDED
OUTPATIENT
Start: 2024-09-03

## 2024-08-07 RX ORDER — ALBUTEROL SULFATE 0.83 MG/ML
3 SOLUTION RESPIRATORY (INHALATION) AS NEEDED
Status: DISCONTINUED | OUTPATIENT
Start: 2024-08-07 | End: 2024-08-07 | Stop reason: HOSPADM

## 2024-08-07 RX ORDER — EPINEPHRINE 0.3 MG/.3ML
0.3 INJECTION SUBCUTANEOUS EVERY 5 MIN PRN
OUTPATIENT
Start: 2024-09-03

## 2024-08-07 RX ORDER — FAMOTIDINE 10 MG/ML
20 INJECTION INTRAVENOUS ONCE AS NEEDED
OUTPATIENT
Start: 2024-09-03

## 2024-08-07 ASSESSMENT — PAIN SCALES - GENERAL: PAINLEVEL: 0-NO PAIN

## 2024-08-08 ENCOUNTER — HOSPITAL ENCOUNTER (OUTPATIENT)
Dept: RADIATION ONCOLOGY | Facility: HOSPITAL | Age: 34
Setting detail: RADIATION/ONCOLOGY SERIES
Discharge: HOME | End: 2024-08-08
Payer: COMMERCIAL

## 2024-08-08 ENCOUNTER — DOCUMENTATION (OUTPATIENT)
Dept: ENDOCRINOLOGY | Facility: CLINIC | Age: 34
End: 2024-08-08
Payer: COMMERCIAL

## 2024-08-08 DIAGNOSIS — C50.812 MALIGNANT NEOPLASM OF OVERLAPPING SITES OF LEFT FEMALE BREAST (MULTI): ICD-10-CM

## 2024-08-08 DIAGNOSIS — Z51.0 ENCOUNTER FOR ANTINEOPLASTIC RADIATION THERAPY: ICD-10-CM

## 2024-08-08 DIAGNOSIS — C79.89 SECONDARY MALIGNANT NEOPLASM OF OTHER SPECIFIED SITES (MULTI): ICD-10-CM

## 2024-08-08 LAB
RAD ONC MSQ ACTUAL FRACTIONS DELIVERED: 8
RAD ONC MSQ ACTUAL SESSION DELIVERED DOSE: 330 CGRAY
RAD ONC MSQ ACTUAL TOTAL DOSE: 2640 CGRAY
RAD ONC MSQ ELAPSED DAYS: 10
RAD ONC MSQ LAST DATE: NORMAL
RAD ONC MSQ PRESCRIBED FRACTIONAL DOSE: 330 CGRAY
RAD ONC MSQ PRESCRIBED NUMBER OF FRACTIONS: 16
RAD ONC MSQ PRESCRIBED TECHNIQUE: NORMAL
RAD ONC MSQ PRESCRIBED TOTAL DOSE: 5280 CGRAY
RAD ONC MSQ PRESCRIPTION PATTERN COMMENT: NORMAL
RAD ONC MSQ START DATE: NORMAL
RAD ONC MSQ TREATMENT COURSE NUMBER: 1
RAD ONC MSQ TREATMENT SITE: NORMAL

## 2024-08-08 PROCEDURE — 77385 HC INTENSITY-MODULATED RADIATION THERAPY (IMRT), SIMPLE: CPT | Performed by: RADIOLOGY

## 2024-08-08 NOTE — PROGRESS NOTES
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 Carriers generally do not experience symptoms.     [   ] Genetic carrier testing reviewed and NEGATIVE result noted.    Additional actions:  [   ] Ok to proceed with next steps, no additional genetic testing or counseling recommended  [  X ] Awaiting partner testing: either a partner or sperm donor will need to be screened for this genetic condition to be sure they are negative before proceeding with embryo transfer. recommend reaching back to the counselors at [facility] for any risk assessment.  [   ] Partner testing reviewed and no concordance. Ok to proceed with planned treatments.     Mandie Santos CNP 08/08/24 9:49 AM

## 2024-08-09 ENCOUNTER — APPOINTMENT (OUTPATIENT)
Dept: RADIATION ONCOLOGY | Facility: HOSPITAL | Age: 34
End: 2024-08-09
Payer: COMMERCIAL

## 2024-08-09 ENCOUNTER — HOSPITAL ENCOUNTER (OUTPATIENT)
Dept: RADIATION ONCOLOGY | Facility: HOSPITAL | Age: 34
Setting detail: RADIATION/ONCOLOGY SERIES
Discharge: HOME | End: 2024-08-09
Payer: COMMERCIAL

## 2024-08-09 DIAGNOSIS — C50.812 MALIGNANT NEOPLASM OF OVERLAPPING SITES OF LEFT FEMALE BREAST (MULTI): ICD-10-CM

## 2024-08-09 DIAGNOSIS — C79.89 SECONDARY MALIGNANT NEOPLASM OF OTHER SPECIFIED SITES (MULTI): ICD-10-CM

## 2024-08-09 DIAGNOSIS — Z51.0 ENCOUNTER FOR ANTINEOPLASTIC RADIATION THERAPY: ICD-10-CM

## 2024-08-09 LAB
RAD ONC MSQ ACTUAL FRACTIONS DELIVERED: 9
RAD ONC MSQ ACTUAL SESSION DELIVERED DOSE: 330 CGRAY
RAD ONC MSQ ACTUAL TOTAL DOSE: 2970 CGRAY
RAD ONC MSQ ELAPSED DAYS: 11
RAD ONC MSQ LAST DATE: NORMAL
RAD ONC MSQ PRESCRIBED FRACTIONAL DOSE: 330 CGRAY
RAD ONC MSQ PRESCRIBED NUMBER OF FRACTIONS: 16
RAD ONC MSQ PRESCRIBED TECHNIQUE: NORMAL
RAD ONC MSQ PRESCRIBED TOTAL DOSE: 5280 CGRAY
RAD ONC MSQ PRESCRIPTION PATTERN COMMENT: NORMAL
RAD ONC MSQ START DATE: NORMAL
RAD ONC MSQ TREATMENT COURSE NUMBER: 1
RAD ONC MSQ TREATMENT SITE: NORMAL
SCAN RESULT: NORMAL

## 2024-08-09 PROCEDURE — 77385 HC INTENSITY-MODULATED RADIATION THERAPY (IMRT), SIMPLE: CPT | Performed by: RADIOLOGY

## 2024-08-12 ENCOUNTER — RADIATION ONCOLOGY OTV (OUTPATIENT)
Dept: RADIATION ONCOLOGY | Facility: HOSPITAL | Age: 34
End: 2024-08-12
Payer: COMMERCIAL

## 2024-08-12 ENCOUNTER — HOSPITAL ENCOUNTER (OUTPATIENT)
Dept: RADIATION ONCOLOGY | Facility: HOSPITAL | Age: 34
Setting detail: RADIATION/ONCOLOGY SERIES
Discharge: HOME | End: 2024-08-12
Payer: COMMERCIAL

## 2024-08-12 VITALS
RESPIRATION RATE: 18 BRPM | WEIGHT: 225.97 LBS | HEART RATE: 86 BPM | DIASTOLIC BLOOD PRESSURE: 76 MMHG | OXYGEN SATURATION: 98 % | TEMPERATURE: 97.2 F | SYSTOLIC BLOOD PRESSURE: 124 MMHG | BODY MASS INDEX: 41.32 KG/M2

## 2024-08-12 DIAGNOSIS — C79.89 SECONDARY MALIGNANT NEOPLASM OF OTHER SPECIFIED SITES (MULTI): ICD-10-CM

## 2024-08-12 DIAGNOSIS — C50.812 MALIGNANT NEOPLASM OF OVERLAPPING SITES OF LEFT FEMALE BREAST (MULTI): ICD-10-CM

## 2024-08-12 DIAGNOSIS — Z51.0 ENCOUNTER FOR ANTINEOPLASTIC RADIATION THERAPY: ICD-10-CM

## 2024-08-12 LAB
RAD ONC MSQ ACTUAL FRACTIONS DELIVERED: 10
RAD ONC MSQ ACTUAL SESSION DELIVERED DOSE: 330 CGRAY
RAD ONC MSQ ACTUAL TOTAL DOSE: 3300 CGRAY
RAD ONC MSQ ELAPSED DAYS: 14
RAD ONC MSQ LAST DATE: NORMAL
RAD ONC MSQ PRESCRIBED FRACTIONAL DOSE: 330 CGRAY
RAD ONC MSQ PRESCRIBED NUMBER OF FRACTIONS: 16
RAD ONC MSQ PRESCRIBED TECHNIQUE: NORMAL
RAD ONC MSQ PRESCRIBED TOTAL DOSE: 5280 CGRAY
RAD ONC MSQ PRESCRIPTION PATTERN COMMENT: NORMAL
RAD ONC MSQ START DATE: NORMAL
RAD ONC MSQ TREATMENT COURSE NUMBER: 1
RAD ONC MSQ TREATMENT SITE: NORMAL

## 2024-08-12 PROCEDURE — 77385 HC INTENSITY-MODULATED RADIATION THERAPY (IMRT), SIMPLE: CPT | Performed by: RADIOLOGY

## 2024-08-12 NOTE — PROGRESS NOTES
Radiation Oncology On Treatment Visit    Patient Name:  Kirsty Shrestha  MRN:  [id number]  :  1990    Referring Provider: No ref. provider found  Primary Care Provider: Caesar Hu MD  Care Team: Patient Care Team:  Caesar Hu MD as PCP - General (Hospitalist)  Roslyn Wheatley PA-C as PCP [facility]  Julia Carrasquillo MD as Consulting Physician (Hematology and Oncology)  Julia Nguyen RN as Registered Nurse (Reproductive Endocrinology and Infertility)    Date of Service: 2024     Diagnosis:   Specialty Problems          Radiation Oncology Problems    Malignant neoplasm of breast (Multi)        Chest wall recurrence of breast cancer, left (Multi)        Malignant neoplasm of overlapping sites of left breast in female, estrogen receptor positive (Multi)         Treatment Summary:  IMRT: Left Breast with lymph nodes    Treatment Period Technique Fraction Dose Fractions Total Dose   Course 1 2024-2024  (days elapsed: 15)         [id number] 2024-2024 VMAT 330 / 330 cGy  3630 / 5,280 cGy     SUBJECTIVE: Pt remains in good spirits.and denies any issues other than newly noticed sore throat and left clavicle pain  No other new issues this week.    OBJECTIVE:   Vital Signs:  /76   Pulse 86   Temp 36.2 °C (97.2 °F) (Skin)   Resp 18   Wt 102 kg (225 lb 15.5 oz)   SpO2 98%   BMI 41.32 kg/m²    Pain Scale: The patient's current pain level was assessed.  They report currently having a pain of 4 out of 10.    Other Pertinent Findings:     Toxicity Assessment          2024    09:15 2024    09:49 2024    10:28 2024    10:29   Toxicity Assessment   Adverse Events Reviewed (WDL) Yes (Within Defined Limits) Yes (Within Defined Limits) Yes (Within Defined Limits)    Treatment Site Breast Breast Breast    Anorexia Grade 0 Grade 0 Grade 0 Grade 0   Anxiety Grade 0 Grade 0 Grade 0 Grade 0   Dehydration Grade 0 Grade 0 Grade 0 Grade 0   Depression Grade  0 Grade 0 Grade 0 Grade 0   Dermatitis Radiation Grade 0 Grade 0 Grade 1       a little more red today. Grade 1       mild redness   Diarrhea Grade 0 Grade 0 Grade 0 Grade 0   Fatigue Grade 0 Grade 0 Grade 1 Grade 0   Fibrosis Deep Connective Tissue Grade 0 Grade 0  Grade 0   Fracture Grade 0 Grade 0  Grade 0   Nausea Grade 0 Grade 0  Grade 0   Pain Grade 0 Grade 0  Grade 1       6/10     left clavical      and right sided throat pain when swallowing   Treatment Related Secondary Malignancy Grade 0 Grade 0  Grade 0   Tumor Pain Grade 0 Grade 0  Grade 0   Vomiting Grade 0 Grade 0  Grade 0   Abdominal Pain Grade 0   Grade 0   Dysphagia Grade 0 Grade 0  Grade 0   Esophagitis Grade 0 Grade 0  Grade 0   Gastric Hemorrhage Grade 0   Grade 0   Mucositis Oral  Grade 0  Grade 0   Dry Mouth Grade 0 Grade 0  Grade 0   Breast Infection Grade 0 Grade 0  Grade 0   Seroma Grade 0      Joint Range of Motion Decreased Grade 0 Grade 0  Grade 0   Joint Range of Motion Decreased Lumbar Spine Grade 0 Grade 0  Grade 0   Breast Atrophy Grade 0 Grade 0  Grade 0   Breast Pain Grade 0 Grade 0  Grade 0   Nipple Deformity Grade 0 Grade 0  Grade 0   Pneumonitis Grade 0 Grade 0  Grade 0   Edema Limbs Grade 0 Grade 0  Grade 0   Lymphedema Grade 0 Grade 0     Thromboembolic Event  Grade 0     Hot Flashes Grade 0 Grade 1       OCCASIONAL HOT FLASH          Assessment / Plan:  The patient is tolerating radiation therapy as anticipated.  Continue per current treatment plan.       Jacques Lorenzo MD, PhD  Attending Physician

## 2024-08-13 ENCOUNTER — HOSPITAL ENCOUNTER (OUTPATIENT)
Dept: RADIATION ONCOLOGY | Facility: HOSPITAL | Age: 34
Setting detail: RADIATION/ONCOLOGY SERIES
Discharge: HOME | End: 2024-08-13
Payer: COMMERCIAL

## 2024-08-13 DIAGNOSIS — C50.812 MALIGNANT NEOPLASM OF OVERLAPPING SITES OF LEFT FEMALE BREAST (MULTI): ICD-10-CM

## 2024-08-13 DIAGNOSIS — Z51.0 ENCOUNTER FOR ANTINEOPLASTIC RADIATION THERAPY: ICD-10-CM

## 2024-08-13 DIAGNOSIS — C79.89 SECONDARY MALIGNANT NEOPLASM OF OTHER SPECIFIED SITES (MULTI): ICD-10-CM

## 2024-08-13 LAB
RAD ONC MSQ ACTUAL FRACTIONS DELIVERED: 11
RAD ONC MSQ ACTUAL SESSION DELIVERED DOSE: 330 CGRAY
RAD ONC MSQ ACTUAL TOTAL DOSE: 3630 CGRAY
RAD ONC MSQ ELAPSED DAYS: 15
RAD ONC MSQ LAST DATE: NORMAL
RAD ONC MSQ PRESCRIBED FRACTIONAL DOSE: 330 CGRAY
RAD ONC MSQ PRESCRIBED NUMBER OF FRACTIONS: 16
RAD ONC MSQ PRESCRIBED TECHNIQUE: NORMAL
RAD ONC MSQ PRESCRIBED TOTAL DOSE: 5280 CGRAY
RAD ONC MSQ PRESCRIPTION PATTERN COMMENT: NORMAL
RAD ONC MSQ START DATE: NORMAL
RAD ONC MSQ TREATMENT COURSE NUMBER: 1
RAD ONC MSQ TREATMENT SITE: NORMAL

## 2024-08-13 PROCEDURE — 77385 HC INTENSITY-MODULATED RADIATION THERAPY (IMRT), SIMPLE: CPT | Performed by: RADIOLOGY

## 2024-08-14 ENCOUNTER — HOSPITAL ENCOUNTER (OUTPATIENT)
Dept: RADIATION ONCOLOGY | Facility: HOSPITAL | Age: 34
Setting detail: RADIATION/ONCOLOGY SERIES
Discharge: HOME | End: 2024-08-14
Payer: COMMERCIAL

## 2024-08-14 DIAGNOSIS — Z51.0 ENCOUNTER FOR ANTINEOPLASTIC RADIATION THERAPY: ICD-10-CM

## 2024-08-14 DIAGNOSIS — C50.812 MALIGNANT NEOPLASM OF OVERLAPPING SITES OF LEFT FEMALE BREAST (MULTI): ICD-10-CM

## 2024-08-14 DIAGNOSIS — C79.89 SECONDARY MALIGNANT NEOPLASM OF OTHER SPECIFIED SITES (MULTI): ICD-10-CM

## 2024-08-14 LAB
RAD ONC MSQ ACTUAL FRACTIONS DELIVERED: 12
RAD ONC MSQ ACTUAL SESSION DELIVERED DOSE: 330 CGRAY
RAD ONC MSQ ACTUAL TOTAL DOSE: 3960 CGRAY
RAD ONC MSQ ELAPSED DAYS: 16
RAD ONC MSQ LAST DATE: NORMAL
RAD ONC MSQ PRESCRIBED FRACTIONAL DOSE: 330 CGRAY
RAD ONC MSQ PRESCRIBED NUMBER OF FRACTIONS: 16
RAD ONC MSQ PRESCRIBED TECHNIQUE: NORMAL
RAD ONC MSQ PRESCRIBED TOTAL DOSE: 5280 CGRAY
RAD ONC MSQ PRESCRIPTION PATTERN COMMENT: NORMAL
RAD ONC MSQ START DATE: NORMAL
RAD ONC MSQ TREATMENT COURSE NUMBER: 1
RAD ONC MSQ TREATMENT SITE: NORMAL

## 2024-08-14 PROCEDURE — 77385 HC INTENSITY-MODULATED RADIATION THERAPY (IMRT), SIMPLE: CPT | Performed by: RADIOLOGY

## 2024-08-15 ENCOUNTER — HOSPITAL ENCOUNTER (OUTPATIENT)
Dept: RADIATION ONCOLOGY | Facility: HOSPITAL | Age: 34
Setting detail: RADIATION/ONCOLOGY SERIES
Discharge: HOME | End: 2024-08-15
Payer: COMMERCIAL

## 2024-08-15 DIAGNOSIS — Z51.0 ENCOUNTER FOR ANTINEOPLASTIC RADIATION THERAPY: ICD-10-CM

## 2024-08-15 DIAGNOSIS — C79.89 SECONDARY MALIGNANT NEOPLASM OF OTHER SPECIFIED SITES (MULTI): ICD-10-CM

## 2024-08-15 DIAGNOSIS — C50.812 MALIGNANT NEOPLASM OF OVERLAPPING SITES OF LEFT FEMALE BREAST (MULTI): ICD-10-CM

## 2024-08-15 LAB
RAD ONC MSQ ACTUAL FRACTIONS DELIVERED: 13
RAD ONC MSQ ACTUAL SESSION DELIVERED DOSE: 330 CGRAY
RAD ONC MSQ ACTUAL TOTAL DOSE: 4290 CGRAY
RAD ONC MSQ ELAPSED DAYS: 17
RAD ONC MSQ LAST DATE: NORMAL
RAD ONC MSQ PRESCRIBED FRACTIONAL DOSE: 330 CGRAY
RAD ONC MSQ PRESCRIBED NUMBER OF FRACTIONS: 16
RAD ONC MSQ PRESCRIBED TECHNIQUE: NORMAL
RAD ONC MSQ PRESCRIBED TOTAL DOSE: 5280 CGRAY
RAD ONC MSQ PRESCRIPTION PATTERN COMMENT: NORMAL
RAD ONC MSQ START DATE: NORMAL
RAD ONC MSQ TREATMENT COURSE NUMBER: 1
RAD ONC MSQ TREATMENT SITE: NORMAL

## 2024-08-15 PROCEDURE — 77385 HC INTENSITY-MODULATED RADIATION THERAPY (IMRT), SIMPLE: CPT | Performed by: RADIOLOGY

## 2024-08-15 PROCEDURE — 77336 RADIATION PHYSICS CONSULT: CPT | Performed by: RADIOLOGY

## 2024-08-16 ENCOUNTER — HOSPITAL ENCOUNTER (OUTPATIENT)
Dept: RADIATION ONCOLOGY | Facility: HOSPITAL | Age: 34
Setting detail: RADIATION/ONCOLOGY SERIES
Discharge: HOME | End: 2024-08-16
Payer: COMMERCIAL

## 2024-08-16 DIAGNOSIS — Z51.0 ENCOUNTER FOR ANTINEOPLASTIC RADIATION THERAPY: ICD-10-CM

## 2024-08-16 DIAGNOSIS — C79.89 SECONDARY MALIGNANT NEOPLASM OF OTHER SPECIFIED SITES (MULTI): ICD-10-CM

## 2024-08-16 DIAGNOSIS — C50.812 MALIGNANT NEOPLASM OF OVERLAPPING SITES OF LEFT FEMALE BREAST (MULTI): ICD-10-CM

## 2024-08-16 LAB
RAD ONC MSQ ACTUAL FRACTIONS DELIVERED: 14
RAD ONC MSQ ACTUAL SESSION DELIVERED DOSE: 330 CGRAY
RAD ONC MSQ ACTUAL TOTAL DOSE: 4620 CGRAY
RAD ONC MSQ ELAPSED DAYS: 18
RAD ONC MSQ LAST DATE: NORMAL
RAD ONC MSQ PRESCRIBED FRACTIONAL DOSE: 330 CGRAY
RAD ONC MSQ PRESCRIBED NUMBER OF FRACTIONS: 16
RAD ONC MSQ PRESCRIBED TECHNIQUE: NORMAL
RAD ONC MSQ PRESCRIBED TOTAL DOSE: 5280 CGRAY
RAD ONC MSQ PRESCRIPTION PATTERN COMMENT: NORMAL
RAD ONC MSQ START DATE: NORMAL
RAD ONC MSQ TREATMENT COURSE NUMBER: 1
RAD ONC MSQ TREATMENT SITE: NORMAL

## 2024-08-16 PROCEDURE — 77385 HC INTENSITY-MODULATED RADIATION THERAPY (IMRT), SIMPLE: CPT | Performed by: RADIOLOGY

## 2024-08-19 ENCOUNTER — HOSPITAL ENCOUNTER (OUTPATIENT)
Dept: RADIATION ONCOLOGY | Facility: HOSPITAL | Age: 34
Setting detail: RADIATION/ONCOLOGY SERIES
Discharge: HOME | End: 2024-08-19
Payer: COMMERCIAL

## 2024-08-19 ENCOUNTER — RADIATION ONCOLOGY OTV (OUTPATIENT)
Dept: RADIATION ONCOLOGY | Facility: HOSPITAL | Age: 34
End: 2024-08-19
Payer: COMMERCIAL

## 2024-08-19 VITALS
BODY MASS INDEX: 41.4 KG/M2 | DIASTOLIC BLOOD PRESSURE: 80 MMHG | HEART RATE: 81 BPM | SYSTOLIC BLOOD PRESSURE: 120 MMHG | WEIGHT: 226.41 LBS | RESPIRATION RATE: 18 BRPM | OXYGEN SATURATION: 100 % | TEMPERATURE: 96.8 F

## 2024-08-19 DIAGNOSIS — Z51.0 ENCOUNTER FOR ANTINEOPLASTIC RADIATION THERAPY: ICD-10-CM

## 2024-08-19 DIAGNOSIS — C50.812 MALIGNANT NEOPLASM OF OVERLAPPING SITES OF LEFT BREAST IN FEMALE, ESTROGEN RECEPTOR POSITIVE (MULTI): Primary | ICD-10-CM

## 2024-08-19 DIAGNOSIS — Z17.0 MALIGNANT NEOPLASM OF OVERLAPPING SITES OF LEFT BREAST IN FEMALE, ESTROGEN RECEPTOR POSITIVE (MULTI): Primary | ICD-10-CM

## 2024-08-19 DIAGNOSIS — C79.89 SECONDARY MALIGNANT NEOPLASM OF OTHER SPECIFIED SITES (MULTI): ICD-10-CM

## 2024-08-19 DIAGNOSIS — C50.812 MALIGNANT NEOPLASM OF OVERLAPPING SITES OF LEFT FEMALE BREAST (MULTI): ICD-10-CM

## 2024-08-19 LAB
RAD ONC MSQ ACTUAL FRACTIONS DELIVERED: 15
RAD ONC MSQ ACTUAL SESSION DELIVERED DOSE: 330 CGRAY
RAD ONC MSQ ACTUAL TOTAL DOSE: 4950 CGRAY
RAD ONC MSQ ELAPSED DAYS: 21
RAD ONC MSQ LAST DATE: NORMAL
RAD ONC MSQ PRESCRIBED FRACTIONAL DOSE: 330 CGRAY
RAD ONC MSQ PRESCRIBED NUMBER OF FRACTIONS: 16
RAD ONC MSQ PRESCRIBED TECHNIQUE: NORMAL
RAD ONC MSQ PRESCRIBED TOTAL DOSE: 5280 CGRAY
RAD ONC MSQ PRESCRIPTION PATTERN COMMENT: NORMAL
RAD ONC MSQ START DATE: NORMAL
RAD ONC MSQ TREATMENT COURSE NUMBER: 1
RAD ONC MSQ TREATMENT SITE: NORMAL

## 2024-08-19 PROCEDURE — 77385 HC INTENSITY-MODULATED RADIATION THERAPY (IMRT), SIMPLE: CPT | Performed by: RADIOLOGY

## 2024-08-19 RX ORDER — LIDOCAINE HYDROCHLORIDE 20 MG/ML
1.25 SOLUTION OROPHARYNGEAL AS NEEDED
Qty: 100 ML | Refills: 0 | Status: SHIPPED | OUTPATIENT
Start: 2024-08-19 | End: 2024-08-29

## 2024-08-19 RX ORDER — DEXAMETHASONE 2 MG/1
2 TABLET ORAL
Qty: 28 TABLET | Refills: 0 | Status: SHIPPED | OUTPATIENT
Start: 2024-08-19 | End: 2024-09-02

## 2024-08-19 NOTE — PROGRESS NOTES
Radiation Oncology On Treatment Visit    Patient Name:  Kirsty Shrestha  MRN:  [id number]  :  1990    Referring Provider: No ref. provider found  Primary Care Provider: Caesar Hu MD  Care Team: Patient Care Team:  Caesar Hu MD as PCP - General (Hospitalist)  Roslyn Wheatley PA-C [facility]  Julia Carrasquillo MD as Consulting Physician (Hematology and Oncology)  Julia Nguyen RN as Registered Nurse (Reproductive Endocrinology and Infertility)    Date of Service: 2024     Diagnosis:   Specialty Problems          Radiation Oncology Problems    Malignant neoplasm of breast (Multi)        Chest wall recurrence of breast cancer, left (Multi)        Malignant neoplasm of overlapping sites of left breast in female, estrogen receptor positive (Multi)         Treatment Summary:  IMRT: Left Breast with lymph nodes    Treatment Period Technique Fraction Dose Fractions Total Dose   Course 1 2024-2024  (days elapsed: 21)         TB_CW_RNI_L 2024-2024 VMAT 330 / 330 cGy 15 / 16 4950 / 5,280 cGy     SUBJECTIVE: Pt presents to clinic in good spirits and denies any issues other than what is denoted below. Pt encouraged to call with any and all issues or concerns.  Patient having moderate hyperpigmentation of the skin with swelling of the left chest wall and breast.  Patient with moderate fatigue and dysphagia with difficulty swallowing solid foods.    OBJECTIVE:   Vital Signs:  /80   Pulse 81   Temp 36 °C (96.8 °F) (Skin)   Resp 18   Wt 103 kg (226 lb 6.6 oz)   SpO2 100%   BMI 41.40 kg/m²    Pain Scale: The patient's current pain level was assessed.  They report currently having a pain of 7 out of 10.    Other Pertinent Findings:     Toxicity Assessment          2024    09:15 2024    09:49 2024    10:28 2024    10:29 2024    09:45   Toxicity Assessment   Adverse Events Reviewed (WDL) Yes (Within Defined Limits) Yes (Within Defined Limits)  Yes (Within Defined Limits)  Yes (Within Defined Limits)   Treatment Site Breast Breast Breast  Breast   Anorexia Grade 0 Grade 0 Grade 0 Grade 0 Grade 1       sore throat making it hard to eat   Anxiety Grade 0 Grade 0 Grade 0 Grade 0 Grade 0   Dehydration Grade 0 Grade 0 Grade 0 Grade 0 Grade 0   Depression Grade 0 Grade 0 Grade 0 Grade 0 Grade 0   Dermatitis Radiation Grade 0 Grade 0 Grade 1       a little more red today. Grade 1       mild redness Grade 1       Mild redness wo breakdown   Diarrhea Grade 0 Grade 0 Grade 0 Grade 0 Grade 0   Fatigue Grade 0 Grade 0 Grade 1 Grade 0 Grade 1   Fibrosis Deep Connective Tissue Grade 0 Grade 0  Grade 0    Fracture Grade 0 Grade 0  Grade 0 Grade 0   Nausea Grade 0 Grade 0  Grade 0 Grade 0   Pain Grade 0 Grade 0  Grade 1       6/10     left clavical      and right sided throat pain when swallowing Grade 1       throat     painful to swallow.   unable to give a numeric value   Treatment Related Secondary Malignancy Grade 0 Grade 0  Grade 0    Tumor Pain Grade 0 Grade 0  Grade 0 Grade 0   Vomiting Grade 0 Grade 0  Grade 0 Grade 0   Abdominal Pain Grade 0   Grade 0    Dysphagia Grade 0 Grade 0  Grade 0    Esophagitis Grade 0 Grade 0  Grade 0    Gastric Hemorrhage Grade 0   Grade 0    Mucositis Oral  Grade 0  Grade 0    Dry Mouth Grade 0 Grade 0  Grade 0 Grade 0   Breast Infection Grade 0 Grade 0  Grade 0 Grade 0   Seroma Grade 0    Grade 0   Joint Range of Motion Decreased Grade 0 Grade 0  Grade 0 Grade 0   Joint Range of Motion Decreased Lumbar Spine Grade 0 Grade 0  Grade 0 Grade 0   Breast Atrophy Grade 0 Grade 0  Grade 0 Grade 0   Breast Pain Grade 0 Grade 0  Grade 0 Grade 0   Nipple Deformity Grade 0 Grade 0  Grade 0 Grade 0   Pneumonitis Grade 0 Grade 0  Grade 0 Grade 0   Edema Limbs Grade 0 Grade 0  Grade 0 Grade 0   Lymphedema Grade 0 Grade 0   Grade 0   Thromboembolic Event  Grade 0   Grade 0   Hot Flashes Grade 0 Grade 1       OCCASIONAL HOT FLASH   Grade 0         Assessment / Plan:  The patient is tolerating radiation therapy as anticipated.  Continue per current treatment plan.   Viscous lidocaine dexamethasone prescription provided.  Follow-up in 4 to 6 weeks with Dafne Gaffney.    Jacques Lorenzo MD, PhD  Attending Physician

## 2024-08-20 ENCOUNTER — DOCUMENTATION (OUTPATIENT)
Dept: RADIATION ONCOLOGY | Facility: HOSPITAL | Age: 34
End: 2024-08-20

## 2024-08-20 ENCOUNTER — HOSPITAL ENCOUNTER (OUTPATIENT)
Dept: RADIATION ONCOLOGY | Facility: HOSPITAL | Age: 34
Setting detail: RADIATION/ONCOLOGY SERIES
Discharge: HOME | End: 2024-08-20
Payer: COMMERCIAL

## 2024-08-20 DIAGNOSIS — C50.812 MALIGNANT NEOPLASM OF OVERLAPPING SITES OF LEFT FEMALE BREAST (MULTI): ICD-10-CM

## 2024-08-20 DIAGNOSIS — Z51.0 ENCOUNTER FOR ANTINEOPLASTIC RADIATION THERAPY: ICD-10-CM

## 2024-08-20 DIAGNOSIS — C79.89 SECONDARY MALIGNANT NEOPLASM OF OTHER SPECIFIED SITES (MULTI): ICD-10-CM

## 2024-08-20 LAB
RAD ONC MSQ ACTUAL FRACTIONS DELIVERED: 16
RAD ONC MSQ ACTUAL SESSION DELIVERED DOSE: 330 CGRAY
RAD ONC MSQ ACTUAL TOTAL DOSE: 5280 CGRAY
RAD ONC MSQ ELAPSED DAYS: 22
RAD ONC MSQ LAST DATE: NORMAL
RAD ONC MSQ PRESCRIBED FRACTIONAL DOSE: 330 CGRAY
RAD ONC MSQ PRESCRIBED NUMBER OF FRACTIONS: 16
RAD ONC MSQ PRESCRIBED TECHNIQUE: NORMAL
RAD ONC MSQ PRESCRIBED TOTAL DOSE: 5280 CGRAY
RAD ONC MSQ PRESCRIPTION PATTERN COMMENT: NORMAL
RAD ONC MSQ START DATE: NORMAL
RAD ONC MSQ TREATMENT COURSE NUMBER: 1
RAD ONC MSQ TREATMENT SITE: NORMAL

## 2024-08-20 PROCEDURE — 77385 HC INTENSITY-MODULATED RADIATION THERAPY (IMRT), SIMPLE: CPT | Performed by: RADIOLOGY

## 2024-08-22 NOTE — PROGRESS NOTES
RADIATION COMPLETION OF THERAPY NOTE    Patient Name:  Kirsty Shrestha  MRN:  [id number]  :  1990    Radiation Oncologist: No care team member to display   Referring Provider: No ref. provider found  Primary Care Provider: Caesar Hu MD    Brief History: Kirsty Shrestha is a 34 y.o. female with Chest wall recurrence of breast cancer, left (Multi), Pathologic: Stage IB (rpT2, pN0(sn), cM0, G3, ER+, MN+, HER2-).  The patient completed radiotherapy as outlined below.    Radiation Treatment Summary:    Radiation Oncology   Radiation Treatments       Active   No active radiation treatments to show.     Completed   TB_CW_RNI_L (Started on 2024)   Most recent fraction: 330 cGy given on 2024   Total given: 5,280 cGy / 5,280 cGy  (16 of 16 fractions)   Elapsed Days: 22   Technique: VMAT                       Concurrent Chemotherapy:  TC (DOCEtaxel / Cyclophosphamide), 21 Day Cycles   Treatment goal Curative   Treatment line Adjuvant   Status Active   Start Date 2024   End Date 2024 (Planned)   Treatment Medications DOCEtaxeL (Taxotere) 75 mg/m2 = 155 mg in dextrose 5 % in water (D5W) 250 mL IV HYPERSENSITIVITY, 75 mg/m2 = 150 mg, intravenous, Once, 4 of 4 cycles  Administration: 155 mg (2024), 155 mg (2024), 150 mg (2024), 150 mg (2024)    methylPREDNISolone sod succinate (SOLU-Medrol) 40 mg/mL injection 40 mg, 40 mg, intravenous, As needed, 5 of 5 cycles  Administration: 40 mg (2024), 40 mg (2024), 40 mg (2024), 40 mg (2024), 40 mg (2024)    cyclophosphamide (Cytoxan) 1,236 mg in sodium chloride 0.9% 311.8 mL IV, 600 mg/m2 = 1,236 mg, intravenous, Once, 5 of 5 cycles  Administration: 1,236 mg (2024), 1,236 mg (2024), 1,236 mg (2024), 1,236 mg (2024)    DOCEtaxeL (Taxotere) 160 mg in dextrose 5 % in water (D5W) 266 mL IV, 75 mg/m2 = 160 mg, intravenous, Once, 1 of 1 cycle  Administration: 160 mg (2024)    pegfilgrastim  (Neulasta) injection 6 mg, 6 mg, subcutaneous, Once, 4 of 4 cycles  Administration: 6 mg (5/15/2024)         CTCAE Toxicity Overview:   Toxicity Assessment          7/29/2024    09:15 8/5/2024    09:49 8/12/2024    10:28 8/12/2024    10:29 8/19/2024    09:45   Toxicity Assessment   Adverse Events Reviewed (WDL) Yes (Within Defined Limits) Yes (Within Defined Limits) Yes (Within Defined Limits)  Yes (Within Defined Limits)   Treatment Site Breast Breast Breast  Breast   Anorexia Grade 0 Grade 0 Grade 0 Grade 0 Grade 1       sore throat making it hard to eat   Anxiety Grade 0 Grade 0 Grade 0 Grade 0 Grade 0   Dehydration Grade 0 Grade 0 Grade 0 Grade 0 Grade 0   Depression Grade 0 Grade 0 Grade 0 Grade 0 Grade 0   Dermatitis Radiation Grade 0 Grade 0 Grade 1       a little more red today. Grade 1       mild redness Grade 1       Mild redness wo breakdown   Diarrhea Grade 0 Grade 0 Grade 0 Grade 0 Grade 0   Fatigue Grade 0 Grade 0 Grade 1 Grade 0 Grade 1   Fibrosis Deep Connective Tissue Grade 0 Grade 0  Grade 0    Fracture Grade 0 Grade 0  Grade 0 Grade 0   Nausea Grade 0 Grade 0  Grade 0 Grade 0   Pain Grade 0 Grade 0  Grade 1       6/10     left clavical      and right sided throat pain when swallowing Grade 1       throat     painful to swallow.   unable to give a numeric value   Treatment Related Secondary Malignancy Grade 0 Grade 0  Grade 0    Tumor Pain Grade 0 Grade 0  Grade 0 Grade 0   Vomiting Grade 0 Grade 0  Grade 0 Grade 0   Abdominal Pain Grade 0   Grade 0    Dysphagia Grade 0 Grade 0  Grade 0    Esophagitis Grade 0 Grade 0  Grade 0    Gastric Hemorrhage Grade 0   Grade 0    Mucositis Oral  Grade 0  Grade 0    Dry Mouth Grade 0 Grade 0  Grade 0 Grade 0   Breast Infection Grade 0 Grade 0  Grade 0 Grade 0   Seroma Grade 0    Grade 0   Joint Range of Motion Decreased Grade 0 Grade 0  Grade 0 Grade 0   Joint Range of Motion Decreased Lumbar Spine Grade 0 Grade 0  Grade 0 Grade 0   Breast Atrophy Grade 0 Grade  0  Grade 0 Grade 0   Breast Pain Grade 0 Grade 0  Grade 0 Grade 0   Nipple Deformity Grade 0 Grade 0  Grade 0 Grade 0   Pneumonitis Grade 0 Grade 0  Grade 0 Grade 0   Edema Limbs Grade 0 Grade 0  Grade 0 Grade 0   Lymphedema Grade 0 Grade 0   Grade 0   Thromboembolic Event  Grade 0   Grade 0   Hot Flashes Grade 0 Grade 1       OCCASIONAL HOT FLASH   Grade 0     Patient Disposition:    Future Appointments       Date / Time Provider Department Dept Phone    9/3/2024 11:00 AM INF [facility] [phone]    9/6/2024 8:15 AM Renee Pugh MD [facility]     9/10/2024 10:00 AM (Arrive by 9:45 AM) Julia Carrasquillo MD [facility] [phone]    10/8/2024 10:00 AM Flaca Gaffney, APRN-CNP [facility] [phone]    10/23/2024 2:20 PM (Arrive by 2:05 PM) Caesar Hu MD [facility] [phone]    11/5/2024 1:00 PM (Arrive by 12:45 PM) Kari Zhao APRN-CNP [facility] [phone]

## 2024-08-23 ENCOUNTER — TELEPHONE (OUTPATIENT)
Dept: HEMATOLOGY/ONCOLOGY | Facility: HOSPITAL | Age: 34
End: 2024-08-23

## 2024-08-23 NOTE — LETTER
August 27, 2024     Patient: Kirsty Shrestha   YOB: 1990   Date of Visit: 8/23/2024       To Whom It May Concern:    Kirsty Shrestha  is under my care for her diagnosis of breast cancer.  She has completed her chemotherapy.  As her medical oncologist, I have no medical objection to her receiving dental care.     If you have any questions or concerns, please don't hesitate to call.         Sincerely,         Julia Carrasquillo MD

## 2024-08-23 NOTE — LETTER
To Whom It May Concern:    Pt Kirsty Shrestha is under my care for a diagnosis of breast cancer.  She has completed her chemotherapy treatment.  As her medical oncologist, I have no medical objection to patient receiving dental care.

## 2024-09-03 ENCOUNTER — APPOINTMENT (OUTPATIENT)
Dept: HEMATOLOGY/ONCOLOGY | Facility: HOSPITAL | Age: 34
End: 2024-09-03
Payer: COMMERCIAL

## 2024-09-10 ENCOUNTER — OFFICE VISIT (OUTPATIENT)
Dept: HEMATOLOGY/ONCOLOGY | Facility: HOSPITAL | Age: 34
End: 2024-09-10
Payer: COMMERCIAL

## 2024-09-10 VITALS
OXYGEN SATURATION: 96 % | HEIGHT: 62 IN | WEIGHT: 218.26 LBS | DIASTOLIC BLOOD PRESSURE: 85 MMHG | RESPIRATION RATE: 17 BRPM | TEMPERATURE: 97.2 F | BODY MASS INDEX: 40.16 KG/M2 | SYSTOLIC BLOOD PRESSURE: 129 MMHG | HEART RATE: 84 BPM

## 2024-09-10 DIAGNOSIS — Z17.0 MALIGNANT NEOPLASM OF OVERLAPPING SITES OF LEFT BREAST IN FEMALE, ESTROGEN RECEPTOR POSITIVE (MULTI): ICD-10-CM

## 2024-09-10 DIAGNOSIS — C50.812 MALIGNANT NEOPLASM OF OVERLAPPING SITES OF LEFT BREAST IN FEMALE, ESTROGEN RECEPTOR POSITIVE (MULTI): ICD-10-CM

## 2024-09-10 PROCEDURE — 99215 OFFICE O/P EST HI 40 MIN: CPT | Performed by: INTERNAL MEDICINE

## 2024-09-10 PROCEDURE — 3008F BODY MASS INDEX DOCD: CPT | Performed by: INTERNAL MEDICINE

## 2024-09-10 PROCEDURE — 3079F DIAST BP 80-89 MM HG: CPT | Performed by: INTERNAL MEDICINE

## 2024-09-10 PROCEDURE — 3074F SYST BP LT 130 MM HG: CPT | Performed by: INTERNAL MEDICINE

## 2024-09-10 RX ORDER — TAMOXIFEN CITRATE 20 MG/1
20 TABLET ORAL DAILY
Qty: 30 TABLET | Refills: 5 | Status: SHIPPED | OUTPATIENT
Start: 2024-09-10 | End: 2025-03-09

## 2024-09-10 ASSESSMENT — PAIN SCALES - GENERAL: PAINLEVEL: 0-NO PAIN
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
## 2024-10-07 ENCOUNTER — TELEPHONE (OUTPATIENT)
Dept: RADIATION ONCOLOGY | Facility: HOSPITAL | Age: 34
End: 2024-10-07
Payer: COMMERCIAL

## 2024-10-07 NOTE — TELEPHONE ENCOUNTER
Called pt to remind of appointment on 10/8/2024  at 10:00 Pt's phone went to voicemail left number if needs to reschedule.      Kari Luke MA

## 2024-10-08 ENCOUNTER — HOSPITAL ENCOUNTER (OUTPATIENT)
Dept: RADIATION ONCOLOGY | Facility: HOSPITAL | Age: 34
Setting detail: RADIATION/ONCOLOGY SERIES
Discharge: HOME | End: 2024-10-08
Payer: COMMERCIAL

## 2024-10-08 VITALS
HEART RATE: 82 BPM | OXYGEN SATURATION: 97 % | HEIGHT: 62 IN | BODY MASS INDEX: 40.21 KG/M2 | WEIGHT: 218.5 LBS | DIASTOLIC BLOOD PRESSURE: 88 MMHG | SYSTOLIC BLOOD PRESSURE: 137 MMHG | TEMPERATURE: 97.2 F | RESPIRATION RATE: 18 BRPM

## 2024-10-08 DIAGNOSIS — Z17.0 MALIGNANT NEOPLASM OF OVERLAPPING SITES OF LEFT BREAST IN FEMALE, ESTROGEN RECEPTOR POSITIVE: Primary | ICD-10-CM

## 2024-10-08 DIAGNOSIS — C50.812 MALIGNANT NEOPLASM OF OVERLAPPING SITES OF LEFT BREAST IN FEMALE, ESTROGEN RECEPTOR POSITIVE: Primary | ICD-10-CM

## 2024-10-08 PROCEDURE — 99214 OFFICE O/P EST MOD 30 MIN: CPT | Performed by: NURSE PRACTITIONER

## 2024-10-08 ASSESSMENT — ENCOUNTER SYMPTOMS
OCCASIONAL FEELINGS OF UNSTEADINESS: 0
LOSS OF SENSATION IN FEET: 0
DEPRESSION: 0

## 2024-10-08 ASSESSMENT — PAIN SCALES - GENERAL: PAINLEVEL: 0-NO PAIN

## 2024-10-08 NOTE — PROGRESS NOTES
Radiation Oncology Follow-Up    Patient Name:  Kirsty Shrestha  MRN:  [id number]  :  1990    Referring Provider: No ref. provider found  Primary Care Provider: Caesar Hu MD  Care Team: Patient Care Team:  Caesar Hu MD as PCP - General (Hospitalist)  Roslyn Wheatley PA-C as PCP - [facility] PCP  Julia Carrasquillo MD as Consulting Physician (Hematology and Oncology)  Julia Nguyen RN as Registered Nurse (Reproductive Endocrinology and Infertility)    Date of Service: 10/8/2024     Cancer History:          Breast         AJCC Edition: 8th (AJCC), Diagnosis Date: 2024         Cancer Staging         Chest wall recurrence of breast cancer, left (CMS/HCC)      Staging form: Breast, AJCC 8th Edition  - Pathologic stage from 2024: Stage IB (rpT2, pN0(sn), cM0, G3, ER+, PA+, HER2-) - Signed by Julia Carrasquillo MD on 2024  Stage prefix: Recurrence  Method of lymph node assessment: [place] lymph node biopsy  Nuclear grade: G3  Multigene prognostic tests performed: None  Histologic grading system: 3 grade system  Menopausal status: Premenopausal     34 y.o. premenopausal female with prior Stage: IA (pT1b pN0) invasive ductal carcinoma of the left breast; ER >95%, PA >95%, Her2-negative, Mammaprint: High-risk, luminal B, -0.615. Now with locally recurrent left invasive ductal carcinoma, stage IB (rPT2, pN0 M0).  The patient's recurrent breast cancer was diagnosed on 2023, and is grade 3, Estrogen Receptor positive at 95%, Progesterone receptor at >95% and her2 equivocal and not amplified at 1.1.      ONCOLOGIC HISTORY:     22: Dx MG/US performed. A 1.3 x 0.6 x 0.7 cm hypoechoic mass seen in left breast (11:00, 13 cm FN). An additional 0.9 cm mass at 2:00 (8 cm FN) was also seen, possibly benign. ALN’s appeared unremarkable.  22: Left breast mass biopsy (11:00) showed IDC, grade 3. ER >95%, PA >95%, Her2-negative (2+ IHC; MAHNAZ ratio 1.9, copy #3.6). Mammaprint high-risk  "(-0.615), luminal B type. Maximum diameter 0.7 cm.  4/28/22: Biopsy of 2:00 lesion benign. Left ALN negative.  Genetic Testing completed. Negative  5/23/2023 bilateral stage 2 breast recosntruction with removal of TE and placement of new IDEA: saline implants 610cc with autologous fat grafting of 160cc per breast and abdominoplasty with rectus plication and umbilical transposition   7/14/22: Bilateral mastectomy with implant reconstruction and left SLNBx performed. No residual carcinoma seen in left breast. Right breast benign. 0/1 left SLN’s involved. pT1bN0 (IA).  08/18/2022: Started on Tamoxifen 20 mg daily by Dr. Connell. Patient did not follow-up with medical oncology after that and stopped Tamoxifen after her prescription run out  12/22/2023: Patient underwent an US-guided left breast biopsy of a cystic mass at 12:00. Pathology was consistent with Invasive ductal carcinoma, grade 3; ER greater than 95%, AR greater than 95%, HER2 negative   1/31/2024: Patient underwent left breast lumpectomy/wide excision of breast cancer and SLNB. Pathology revealed multifocal disease with 4 separate nodules measuring 32, 11, 7 and 4 mm respectively with 0/1 SLNs involved. With invasive cancer present at lateral margin, within microns of posterior margin and <1mm of anterior and medial margins. Repeat receptor of mass at 12:00 showed Estrogen Receptor positive at 95%, Progesteron receptor at >95% and her2 equivocal and not amplified at 1.1    02/26/2024: Patient underwent re-excision  04/16/2024: Patient underwent egg retrieval. Total eggs retrieved = 8  04/23/2024: Cycle #1 of TC attempted and discontinued due to hypersensitivity reaction  07/16/2024: Received the last cycle of TC  Radiation therapy to left breast as follows\"  IMRT: Left Breast with lymph nodes    Treatment Period Technique Fraction Dose Fractions Total Dose   Course 1 7/29/2024-8/20/2024  (days elapsed: 22)         [id number] 7/29/2024-8/20/2024 VMAT 330 / " 330 cGy 16 / 16 5,280 / 5,280 cGy     SUBJECTIVE  History of Present Illness:   Kirsty Shrestha is here today for routine radiation follow up/initial radiation survivorship visit.  She is accompanied today by her boyfriend. Doing well post radiation and left breast healing well with skin intact. She endorses some mild swelling and discoloration. No swelling of left arm or difficulty with ROM. Taking tamoxifen and no adverse effects. She has a Bertha IUD and no menses. Denies headaches, fever, chills, cough, SOB, chest pain, n/v/c/d or bony pain.     Review of Systems:    Review of Systems   All other systems reviewed and are negative.    Performance Status:   The Karnofsky performance scale today is 100, Fully active, able to carry on all pre-disease performed without restriction (ECOG equivalent 0).      Vital Signs: /88   Pulse 82   Temp 36.2 °C (97.2 °F) (Temporal)   Resp 18   Wt 99.1 kg (218 lb 8 oz)   SpO2 97%   BMI 39.95 kg/m²     OBJECTIVE    Current Outpatient Medications:     dexAMETHasone (Decadron) 2 mg tablet, Take 1 tablet (2 mg) by mouth 2 times daily (morning and late afternoon) for 14 days., Disp: 28 tablet, Rfl: 0    fexofenadine-pseudoephedrine (Allegra-D 24) 180-240 mg 24 hr tablet, Take 1 tablet by mouth once daily. Do not crush, chew, or split., Disp: , Rfl:     levonorgestrel (Mirena) 21 mcg/24 hours (8 yrs) 52 mg IUD, 52 mg by intrauterine route once daily., Disp: , Rfl:     tamoxifen (Nolvadex) 20 mg tablet, Take 1 tablet (20 mg total) by mouth once daily.  Take with water or any other nonalcoholic drink with or without food at around the same time(s) every day., Disp: 30 tablet, Rfl: 5     Physical Exam  Vitals reviewed.   Constitutional:       Appearance: Normal appearance.   HENT:      Head: Normocephalic and atraumatic.      Nose: Nose normal.      Mouth/Throat:      Mouth: Mucous membranes are dry.   Eyes:      Extraocular Movements: Extraocular movements intact.       Pupils: Pupils are equal, round, and reactive to light.   Cardiovascular:      Rate and Rhythm: Normal rate and regular rhythm.      Heart sounds: Normal heart sounds.   Pulmonary:      Effort: Pulmonary effort is normal.      Breath sounds: Normal breath sounds.   Chest:          Comments: Bilateral implant reconstruction. Mild swelling left breast with skin tanning. Skin intact.   Abdominal:      Palpations: Abdomen is soft.   Musculoskeletal:         General: No swelling. Normal range of motion.      Cervical back: Normal range of motion and neck supple.   Lymphadenopathy:      Cervical: No cervical adenopathy.      Upper Body:      Right upper body: No supraclavicular or axillary adenopathy.      Left upper body: No supraclavicular or axillary adenopathy.   Skin:     General: Skin is warm and dry.   Neurological:      General: No focal deficit present.      Mental Status: She is alert and oriented to person, place, and time.   Psychiatric:         Mood and Affect: Mood normal.         Behavior: Behavior normal.        ASSESSMENT:  34 y.o. female with stage IB (rpT2, pN0(sn), cM0, G3, ER+, [place]+, HER2-) of the left breast s/p   left breast lumpectomy/wide excision of breast cancer and SLNB followed by chemotherapy and radiation.  Doing well. Reviewed skin care, breast massage, possible late effects of chemotherapy and follow up plan.     PLAN:    - Continue tamoxifen. Has FUV scheduled with Dr. Carrasquillo next week.   - Radiation follow up in 6 mo.  Call with any questions or concerns.     Dafne Gaffney CNP  [phone]

## 2024-10-15 ENCOUNTER — APPOINTMENT (OUTPATIENT)
Dept: HEMATOLOGY/ONCOLOGY | Facility: HOSPITAL | Age: 34
End: 2024-10-15
Payer: COMMERCIAL

## 2024-10-22 ENCOUNTER — OFFICE VISIT (OUTPATIENT)
Dept: HEMATOLOGY/ONCOLOGY | Facility: HOSPITAL | Age: 34
End: 2024-10-22
Payer: COMMERCIAL

## 2024-10-22 ENCOUNTER — HOSPITAL ENCOUNTER (OUTPATIENT)
Dept: RADIOLOGY | Facility: HOSPITAL | Age: 34
Discharge: HOME | End: 2024-10-22
Payer: COMMERCIAL

## 2024-10-22 VITALS
RESPIRATION RATE: 17 BRPM | WEIGHT: 213.19 LBS | SYSTOLIC BLOOD PRESSURE: 137 MMHG | BODY MASS INDEX: 39.23 KG/M2 | TEMPERATURE: 97.2 F | DIASTOLIC BLOOD PRESSURE: 80 MMHG | OXYGEN SATURATION: 97 % | HEIGHT: 62 IN | HEART RATE: 75 BPM

## 2024-10-22 DIAGNOSIS — Z17.0 MALIGNANT NEOPLASM OF OVERLAPPING SITES OF LEFT BREAST IN FEMALE, ESTROGEN RECEPTOR POSITIVE: ICD-10-CM

## 2024-10-22 DIAGNOSIS — Z85.3 HISTORY OF MALIGNANT NEOPLASM OF BREAST: ICD-10-CM

## 2024-10-22 DIAGNOSIS — C50.812 MALIGNANT NEOPLASM OF OVERLAPPING SITES OF LEFT BREAST IN FEMALE, ESTROGEN RECEPTOR POSITIVE: ICD-10-CM

## 2024-10-22 DIAGNOSIS — Z85.3 HISTORY OF MALIGNANT NEOPLASM OF BREAST: Primary | ICD-10-CM

## 2024-10-22 PROCEDURE — 3075F SYST BP GE 130 - 139MM HG: CPT | Performed by: INTERNAL MEDICINE

## 2024-10-22 PROCEDURE — 3008F BODY MASS INDEX DOCD: CPT | Performed by: INTERNAL MEDICINE

## 2024-10-22 PROCEDURE — 99215 OFFICE O/P EST HI 40 MIN: CPT | Performed by: INTERNAL MEDICINE

## 2024-10-22 PROCEDURE — 3079F DIAST BP 80-89 MM HG: CPT | Performed by: INTERNAL MEDICINE

## 2024-10-22 PROCEDURE — 76642 ULTRASOUND BREAST LIMITED: CPT | Mod: LEFT SIDE | Performed by: STUDENT IN AN ORGANIZED HEALTH CARE EDUCATION/TRAINING PROGRAM

## 2024-10-22 PROCEDURE — 76642 ULTRASOUND BREAST LIMITED: CPT | Mod: LT

## 2024-10-22 PROCEDURE — 76982 USE 1ST TARGET LESION: CPT | Mod: LT

## 2024-10-22 RX ORDER — EPINEPHRINE 0.3 MG/.3ML
0.3 INJECTION SUBCUTANEOUS EVERY 5 MIN PRN
OUTPATIENT
Start: 2024-11-05

## 2024-10-22 RX ORDER — AMOXICILLIN AND CLAVULANATE POTASSIUM 875; 125 MG/1; MG/1
875 TABLET, FILM COATED ORAL 2 TIMES DAILY
Qty: 14 TABLET | Refills: 0 | Status: SHIPPED | OUTPATIENT
Start: 2024-10-22 | End: 2024-10-29

## 2024-10-22 RX ORDER — FAMOTIDINE 10 MG/ML
20 INJECTION INTRAVENOUS ONCE AS NEEDED
OUTPATIENT
Start: 2024-11-05

## 2024-10-22 RX ORDER — ALBUTEROL SULFATE 0.83 MG/ML
3 SOLUTION RESPIRATORY (INHALATION) AS NEEDED
OUTPATIENT
Start: 2024-11-05

## 2024-10-22 RX ORDER — DIPHENHYDRAMINE HYDROCHLORIDE 50 MG/ML
50 INJECTION INTRAMUSCULAR; INTRAVENOUS AS NEEDED
OUTPATIENT
Start: 2024-11-05

## 2024-10-22 ASSESSMENT — PAIN SCALES - GENERAL: PAINLEVEL_OUTOF10: 8
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
## 2024-10-23 ENCOUNTER — APPOINTMENT (OUTPATIENT)
Dept: PRIMARY CARE | Facility: CLINIC | Age: 34
End: 2024-10-23
Payer: COMMERCIAL

## 2024-11-05 ENCOUNTER — OFFICE VISIT (OUTPATIENT)
Dept: HEMATOLOGY/ONCOLOGY | Facility: HOSPITAL | Age: 34
End: 2024-11-05
Payer: COMMERCIAL

## 2024-11-05 ENCOUNTER — APPOINTMENT (OUTPATIENT)
Dept: HEMATOLOGY/ONCOLOGY | Facility: HOSPITAL | Age: 34
End: 2024-11-05
Payer: COMMERCIAL

## 2024-11-05 ENCOUNTER — INFUSION (OUTPATIENT)
Dept: HEMATOLOGY/ONCOLOGY | Facility: HOSPITAL | Age: 34
End: 2024-11-05
Payer: COMMERCIAL

## 2024-11-05 VITALS
HEIGHT: 62 IN | RESPIRATION RATE: 17 BRPM | TEMPERATURE: 97.5 F | BODY MASS INDEX: 38.46 KG/M2 | DIASTOLIC BLOOD PRESSURE: 73 MMHG | SYSTOLIC BLOOD PRESSURE: 137 MMHG | HEART RATE: 67 BPM | WEIGHT: 209 LBS | OXYGEN SATURATION: 96 %

## 2024-11-05 DIAGNOSIS — Z91.89 AT RISK FOR INFERTILITY: ICD-10-CM

## 2024-11-05 DIAGNOSIS — Z17.0 MALIGNANT NEOPLASM OF LEFT BREAST IN FEMALE, ESTROGEN RECEPTOR POSITIVE, UNSPECIFIED SITE OF BREAST: Primary | ICD-10-CM

## 2024-11-05 DIAGNOSIS — Z85.3 HISTORY OF MALIGNANT NEOPLASM OF BREAST: ICD-10-CM

## 2024-11-05 DIAGNOSIS — C50.912 MALIGNANT NEOPLASM OF LEFT BREAST IN FEMALE, ESTROGEN RECEPTOR POSITIVE, UNSPECIFIED SITE OF BREAST: Primary | ICD-10-CM

## 2024-11-05 DIAGNOSIS — Z17.0 MALIGNANT NEOPLASM OF OVERLAPPING SITES OF LEFT BREAST IN FEMALE, ESTROGEN RECEPTOR POSITIVE: ICD-10-CM

## 2024-11-05 DIAGNOSIS — C50.812 MALIGNANT NEOPLASM OF OVERLAPPING SITES OF LEFT BREAST IN FEMALE, ESTROGEN RECEPTOR POSITIVE: ICD-10-CM

## 2024-11-05 PROCEDURE — 99214 OFFICE O/P EST MOD 30 MIN: CPT | Performed by: INTERNAL MEDICINE

## 2024-11-05 PROCEDURE — 96401 CHEMO ANTI-NEOPL SQ/IM: CPT

## 2024-11-05 PROCEDURE — 99215 OFFICE O/P EST HI 40 MIN: CPT | Performed by: NURSE PRACTITIONER

## 2024-11-05 PROCEDURE — 3075F SYST BP GE 130 - 139MM HG: CPT | Performed by: INTERNAL MEDICINE

## 2024-11-05 PROCEDURE — 3008F BODY MASS INDEX DOCD: CPT | Performed by: INTERNAL MEDICINE

## 2024-11-05 PROCEDURE — 3078F DIAST BP <80 MM HG: CPT | Performed by: INTERNAL MEDICINE

## 2024-11-05 PROCEDURE — 2500000004 HC RX 250 GENERAL PHARMACY W/ HCPCS (ALT 636 FOR OP/ED): Mod: JZ | Performed by: INTERNAL MEDICINE

## 2024-11-05 RX ORDER — DIPHENHYDRAMINE HYDROCHLORIDE 50 MG/ML
50 INJECTION INTRAMUSCULAR; INTRAVENOUS AS NEEDED
OUTPATIENT
Start: 2025-01-28

## 2024-11-05 RX ORDER — AMOXICILLIN AND CLAVULANATE POTASSIUM 875; 125 MG/1; MG/1
875 TABLET, FILM COATED ORAL 2 TIMES DAILY
Qty: 14 TABLET | Refills: 0 | Status: SHIPPED | OUTPATIENT
Start: 2024-11-05 | End: 2024-11-12

## 2024-11-05 RX ORDER — FAMOTIDINE 10 MG/ML
20 INJECTION INTRAVENOUS ONCE AS NEEDED
OUTPATIENT
Start: 2025-01-28

## 2024-11-05 RX ORDER — ALBUTEROL SULFATE 0.83 MG/ML
3 SOLUTION RESPIRATORY (INHALATION) AS NEEDED
OUTPATIENT
Start: 2025-01-28

## 2024-11-05 RX ORDER — EPINEPHRINE 0.3 MG/.3ML
0.3 INJECTION SUBCUTANEOUS EVERY 5 MIN PRN
OUTPATIENT
Start: 2025-01-28

## 2024-11-05 ASSESSMENT — ENCOUNTER SYMPTOMS
FATIGUE: 0
CHILLS: 0
NERVOUS/ANXIOUS: 0
FEVER: 0
CONFUSION: 0
DEPRESSION: 0

## 2024-11-05 ASSESSMENT — PAIN SCALES - GENERAL: PAINLEVEL_OUTOF10: 0-NO PAIN

## 2024-11-05 NOTE — PROGRESS NOTES
Patient ID: Kirsty Shrestha is a 34 y.o. female.  Referring Physician: Kari Zhao, APRN-CNP  [id number] [place] [address]  Primary Care Provider: Caesar Hu MD  Oncologic History:  4/8/22: Dx MG/US performed. A 1.3 x 0.6 x 0.7 cm hypoechoic mass seen in left breast (11:00, 13 cm FN). An additional 0.9 cm mass at 2:00 (8 cm FN) was also seen, possibly benign. ALN’s appeared unremarkable.  4/20/22: Left breast mass biopsy (11:00) showed IDC, grade 3. ER >95%, NJ >95%, Her2-negative (2+ IHC; MAHNAZ ratio 1.9, copy #3.6). Mammaprint high-risk (-0.615), luminal B type. Maximum diameter 0.7 cm.  4/28/22: Biopsy of 2:00 lesion benign. Left ALN negative.  Genetic Testing completed. Negative  5/23/2023 bilateral stage 2 breast recosntruction with removal of TE and placement of new IDEA: saline implants 610cc with autologous fat grafting of 160cc per breast and abdominoplasty with rectus plication and umbilical transposition   7/14/22: Bilateral mastectomy with implant reconstruction and left SLNBx performed. No residual carcinoma seen in left breast. Right breast benign. 0/1 left SLN’s involved. pT1bN0 ([place]).  08/18/2022: Started on Tamoxifen 20 mg daily by Dr. Connell. Patient did not follow-up with medical oncology after that and stopped Tamoxifen after her prescription run out  12/22/2023: Patient underwent an US-guided left breast biopsy of a cystic mass at 12:00. Pathology was consistent with Invasive ductal carcinoma, grade 3; ER greater than 95%, NJ greater than 95%, HER2 negative   1/31/2024: Patient underwent left breast lumpectomy/wide excision of breast cancer and SLNB. Pathology revealed multifocal disease with 4 separate nodules measuring 32, 11, 7 and 4 mm respectively with 0/1 SLNs involved. With invasive cancer present at lateral margin, within microns of posterior margin and <1mm of anterior and medial margins. Repeat receptor of mass at 12:00 showed Estrogen Receptor positive at 95%,  Progesteron receptor at >95% and her2 equivocal and not amplified at 1.1    02/26/2024: Patient underwent re-excision  04/16/2024: oocyte retrieval, 8 mature oocytes cryopreserved  04/23/2024: Cycle #1 of TC attempted and discontinued due to hypersensitivity reaction  07/16/2024: Received the last cycle of TC  07/29-08/20/2024 radiation therapy to L breast, total dose 5280cGy    Subjective    Ms. Shrestha is a pleasant 34 y.o. premenopausal AA female with prior Stage: [place] (pT1b pN0) invasive ductal carcinoma of the left breast; ER >95%, MI >95%, Her2-negative, Mammaprint: High-risk, luminal B, -0.615. Now with locally recurrent left invasive ductal carcinoma, stage IB (rPT2, pN0 M0).  The patient's recurrent breast cancer was diagnosed on December 22, 2023, and is grade 3, Estrogen Receptor positive at 95%, Progesteron receptor at >95% and her2 equivocal.    Returns today in follow up regarding issues unique to young adults with cancer.     In the interim since our last visit she completed radiation to her L breast and began oral endocrine therapy with tamoxifen 08/2024.     She has also been dealing with L breast swelling/pain and erythema thought to be d/t abscess/cellulitis and/or lymphedema for which she is on antibiotics and seeing plastics in follow up.     Overall relays doing very well. Physically, she has returned to working out at the gym three times/week - she is working on losing weight that she gained while going through treatment. She is also being more mindful about her diet, eating smaller portions, less simple carbohydrates.     Experienced a resumption of menses last week 10/28/24, no issues with heavy bleeding, but length of bleeding was longer than previous cycles and she noted significantly down/depressed mood. Still experiencing hot flashes - generally these are mild in nature, occur for seconds at a time, less than half the days of the week and occur mostly at night. Continues to do supportive  interventions, using fan, open windows to manage hot flashes.     Is sexually active with male partner, denies issues with sexual dysfunction. Continues on IUD for contraception.     Emotionally, she reports feeling well other than week with menses. Feels optimistic about the future. 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 Denies concerns with anxiety, excessive worry, or depressed thoughts. Continues to feel that her daughter is well supported, engaged with [facility] and has support from family and friends.      Social History: Lives in [place] with daughter 12 y.o. Not currently working, previously worked in construction. In a relationship. Grew up in the area and has family nearby. Does not smoke, vape, or use recreational drugs. Previously drank 5 drinks/week. Now not using ETOH.  Review of Systems   Constitutional:  Negative for chills, fatigue and fever.   Psychiatric/Behavioral:  Negative for confusion and depression. The patient is not nervous/anxious.       Objective   BSA: There is no height or weight on file to calculate BSA.  There were no vitals taken for this visit.Weight and VS reviewed in oncology encounter  Performance Status:  Asymptomatic    Current Outpatient Medications   Medication Instructions    amoxicillin-pot clavulanate (Augmentin) 875-125 mg tablet 875 mg, oral, 2 times daily    dexAMETHasone (DECADRON) 2 mg, oral, 2 times daily (morning and late afternoon)    fexofenadine-pseudoephedrine (Allegra-D 24) 180-240 mg 24 hr tablet 1 tablet, Daily    levonorgestrel (Mirena) 21 mcg/24 hours (8 yrs) 52 mg IUD 1 each, Daily RT    tamoxifen (NOLVADEX) 20 mg, oral, Daily, Take with water or any other nonalcoholic drink with or without food at around the same time(s) every day.     No family history on file.  Oncology History   Malignant neoplasm of breast   11/28/2022 Initial Diagnosis    Malignant neoplasm of breast (CMS/HCC)     4/23/2024 - 7/16/2024 Chemotherapy    TC (DOCEtaxel /  Cyclophosphamide), 21 Day Cycles     Chest wall recurrence of breast cancer, left (Multi)   1/12/2024 Initial Diagnosis    Chest wall recurrence of breast cancer, left (CMS/HCC)     1/31/2024 Cancer Staged    Staging form: Breast, AJCC 8th Edition, Pathologic stage from 1/31/2024: Stage IB (rpT2, pN0(sn), cM0, G3, ER+, SC+, HER2-) - Signed by Julia Carrasquillo MD on 2/27/2024       Kirsty Shrestha  reports that she has never smoked. She has never been exposed to tobacco smoke. She has never used smokeless tobacco.  She  reports current alcohol use.  She  reports no history of drug use.    Physical Exam  Constitutional:       General: She is not in acute distress.     Appearance: Normal appearance. She is not ill-appearing.   Neurological:      General: No focal deficit present.      Mental Status: She is alert and oriented to person, place, and time.      Motor: No weakness.      Gait: Gait normal.   Psychiatric:         Mood and Affect: Mood normal.         Behavior: Behavior normal.         Thought Content: Thought content normal.         Judgment: Judgment normal.     No visits with results within 1 Week(s) from this visit.   Latest known visit with results is:   Hospital Outpatient Visit on 08/20/2024   Component Date Value Ref Range Status    Treatment Site 08/20/2024 [id number]   Final    Course Number 08/20/2024 1   Final    Prescribed Fractional Dose 08/20/2024 330  cGray Final    Prescribed Total Dose 08/20/2024 5,280  cGray Final    Actual Fractions Delivered 08/20/2024 16   Final    Prescription Pattern Comment 08/20/2024 daily CBCT, align breast, daily ABC   Final    Actual Session Delivered Dose 08/20/2024 330  cGray Final    Actual Total Dose 08/20/2024 5,280  cGray Final    Prescribed Technique 08/20/2024 VMAT   Final    Elapsed Days 08/20/2024 22   Final    Start Date 08/20/2024 7/29/2024   Final    Last Date 08/20/2024 8/20/2024   Final    Prescribed Number of Fractions 08/20/2024 16   Final         Assessment/Plan    Kirsty Shrestha is a 34 y.o. F with prior Stage: [place] (pT1b pN0) invasive ductal carcinoma of the left breast; ER >95%, PA >95%, Her2-negative, s/p bilateral mastectomies but lost to follow up on oral endocrine therapy. Represented in December with locally recurrent left invasive ductal carcinoma, stage IB (rPT2, pN0 M0) ER/PA+ HER2 equivocal. S/p L lumpectomy with wide excision 2024 followed by re-excision 2024 d/t positive margins. S/p chemotherapy with docetaxol and cyclophosphamide x 4 cycles, and chest irradiation, currently on oral endocrine therapy.     #Psychosocial: young adult and parent with cancer  - Connected to [facility] and engaged in programs  - Receiving young adult social meet up invitations via [employer]  - Will re-refer to MANUEL in breast program re help with housing/rent  - Provided patient with [email or web address] trip/retreat programs for YA with hx of cancer    #Hot flashes: due to ovarian suppression/low circulating estrogen  - Continue supportive interventions, dressing in layers, fan at night, avoiding triggers  - Discussed medication management with SSRI/SNRI or gabapentinoids -- she will think about this, currently symptoms are mild    #Sexual Dysfunction/Contraception:  - Have previously discussed the seriousness of getting pregnant while receiving chemotherapy due to the harmful effects this could have on the fetus, and on her cancer treatment given the decreased availability of medical  services.   - Currently has mirena IUD in place -- safe form of contraception for breast cancer with hormone sensitivity  - Mild symptoms of dysparaneuria - instructed to use topical lubricant with sex - silicone or water based and monitor  - We discussed additional interventions including vaginal moisturizer and/or topical vaginal estrogen  - She was encouraged to reach out if she experiences any concerns regarding intimacy    #Diet/Exercise/Healthy Lifestyle:  -  Currently engaging in exercise three times/week - encouraged continued engagement  - Research indicates consumption of a healthy, plant based diet not only decreases cancer risk, but also has been found to improve survival in cancer patients who partake in a healthy diet - encouraged small changes in diet such as incorporation of healthier snacks/fruits/vegetables to translate into longer term lifestyle changes  - Encouraged patient to continue to abstain from alcohol, tobacco, and recreational drugs    #Risk for infertility:  - Previously discussed the damaging effect cancer treatment can have on the ovaries.   - Previously discussed natural age related decline in fertility and menopause that occurs as a result of ovarian aging, and that cancer treatments can accellerate that progression and diminish ovarian reserve.  - Individuals may experience varying degrees of short or long term ovarian dysfunction and amenorrhea, and that this is dependent upon type of cancer treatment, cumulative dose, and patients age.   - Loss of ovarian function may be permanent or temporary.  - Amenorrhea may be temporary or permanent -- temporary amenorrhea results from destruction of maturing follicles whereas permanent amenorrhea results from depletion of viable primordial follicles.  - Risk to fertility is INTERMEDIATE based on cancer treatment of Cyclophosphamide at doses ~5000mg  - Baseline AMH testing 03.08.24 1.25  - Underwent ovarian stimulation and oocyte retrieval 04/16 retrieved 8 mature oocytes -- at [facility] in long term storage  - Began endocrine therapy with tamoxifen 08/2024 - reviewed again POSITIVE trial and that at the earliest could consider family building in May 2026 - needs at least 18 months on therapy followed by 3 month washout    #Genetic Risk: young adult with cancer and personal family history of cancer  - Has been seen by genetic risk and no mutations identified    Follow up in 6 months     The amount of  time that I spent with the patient:  Prep: 5  Time spend directly with patient: 35  Coordinating care:   Documentation: 5  Other time:    Total time spent on encounter: 45             Kari Zhao, PUSHPA-CNP
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
## 2024-11-05 NOTE — LETTER
November 5, 2024     Patient: Kirsty Shrestha   YOB: 1990   Date of Visit: 11/5/2024       To Whom It May Concern:    Kirsty Shrestha was seen in my clinic on 11/5/2024. She has completed treatment and is clear to return to work as of 11/5/2024.    If you have any questions or concerns, please don't hesitate to call.         Sincerely,         Julia Carrasquillo MD

## 2024-11-09 ENCOUNTER — HOSPITAL ENCOUNTER (EMERGENCY)
Facility: HOSPITAL | Age: 34
Discharge: HOME | End: 2024-11-09
Attending: EMERGENCY MEDICINE
Payer: COMMERCIAL

## 2024-11-09 ENCOUNTER — NURSE TRIAGE (OUTPATIENT)
Dept: ADMISSION | Facility: HOSPITAL | Age: 34
End: 2024-11-09
Payer: COMMERCIAL

## 2024-11-09 VITALS
WEIGHT: 205 LBS | RESPIRATION RATE: 18 BRPM | SYSTOLIC BLOOD PRESSURE: 141 MMHG | HEART RATE: 74 BPM | HEIGHT: 62 IN | BODY MASS INDEX: 37.73 KG/M2 | OXYGEN SATURATION: 100 % | DIASTOLIC BLOOD PRESSURE: 84 MMHG | TEMPERATURE: 98.5 F

## 2024-11-09 DIAGNOSIS — N39.0 UTI (URINARY TRACT INFECTION), BACTERIAL: Primary | ICD-10-CM

## 2024-11-09 DIAGNOSIS — A49.9 UTI (URINARY TRACT INFECTION), BACTERIAL: Primary | ICD-10-CM

## 2024-11-09 LAB
ALBUMIN SERPL BCP-MCNC: 3.9 G/DL (ref 3.4–5)
ALP SERPL-CCNC: 76 U/L (ref 33–110)
ALT SERPL W P-5'-P-CCNC: 18 U/L (ref 7–45)
ANION GAP SERPL CALC-SCNC: 11 MMOL/L (ref 10–20)
APPEARANCE UR: ABNORMAL
AST SERPL W P-5'-P-CCNC: 20 U/L (ref 9–39)
B-HCG SERPL-ACNC: <2 MIU/ML
BACTERIA #/AREA URNS AUTO: ABNORMAL /HPF
BASOPHILS # BLD AUTO: 0.02 X10*3/UL (ref 0–0.1)
BASOPHILS NFR BLD AUTO: 0.3 %
BILIRUB SERPL-MCNC: 0.3 MG/DL (ref 0–1.2)
BILIRUB UR STRIP.AUTO-MCNC: NEGATIVE MG/DL
BUN SERPL-MCNC: 11 MG/DL (ref 6–23)
CALCIUM SERPL-MCNC: 8.6 MG/DL (ref 8.6–10.3)
CHLORIDE SERPL-SCNC: 108 MMOL/L (ref 98–107)
CO2 SERPL-SCNC: 26 MMOL/L (ref 21–32)
COLOR UR: YELLOW
CREAT SERPL-MCNC: 0.85 MG/DL (ref 0.5–1.05)
EGFRCR SERPLBLD CKD-EPI 2021: >90 ML/MIN/1.73M*2
EOSINOPHIL # BLD AUTO: 0.08 X10*3/UL (ref 0–0.7)
EOSINOPHIL NFR BLD AUTO: 1.1 %
ERYTHROCYTE [DISTWIDTH] IN BLOOD BY AUTOMATED COUNT: 13.9 % (ref 11.5–14.5)
GLUCOSE SERPL-MCNC: 105 MG/DL (ref 74–99)
GLUCOSE UR STRIP.AUTO-MCNC: NORMAL MG/DL
HCT VFR BLD AUTO: 35.9 % (ref 36–46)
HGB BLD-MCNC: 12.2 G/DL (ref 12–16)
IMM GRANULOCYTES # BLD AUTO: 0.02 X10*3/UL (ref 0–0.7)
IMM GRANULOCYTES NFR BLD AUTO: 0.3 % (ref 0–0.9)
KETONES UR STRIP.AUTO-MCNC: NEGATIVE MG/DL
LEUKOCYTE ESTERASE UR QL STRIP.AUTO: ABNORMAL
LIPASE SERPL-CCNC: 18 U/L (ref 9–82)
LYMPHOCYTES # BLD AUTO: 1.04 X10*3/UL (ref 1.2–4.8)
LYMPHOCYTES NFR BLD AUTO: 14.8 %
MCH RBC QN AUTO: 28.9 PG (ref 26–34)
MCHC RBC AUTO-ENTMCNC: 34 G/DL (ref 32–36)
MCV RBC AUTO: 85 FL (ref 80–100)
MONOCYTES # BLD AUTO: 0.4 X10*3/UL (ref 0.1–1)
MONOCYTES NFR BLD AUTO: 5.7 %
MUCOUS THREADS #/AREA URNS AUTO: ABNORMAL /LPF
NEUTROPHILS # BLD AUTO: 5.48 X10*3/UL (ref 1.2–7.7)
NEUTROPHILS NFR BLD AUTO: 77.8 %
NITRITE UR QL STRIP.AUTO: ABNORMAL
NRBC BLD-RTO: 0 /100 WBCS (ref 0–0)
PH UR STRIP.AUTO: 6 [PH]
PLATELET # BLD AUTO: 339 X10*3/UL (ref 150–450)
POTASSIUM SERPL-SCNC: 3.5 MMOL/L (ref 3.5–5.3)
PROT SERPL-MCNC: 7.2 G/DL (ref 6.4–8.2)
PROT UR STRIP.AUTO-MCNC: ABNORMAL MG/DL
RBC # BLD AUTO: 4.22 X10*6/UL (ref 4–5.2)
RBC # UR STRIP.AUTO: NEGATIVE /UL
RBC #/AREA URNS AUTO: ABNORMAL /HPF
SODIUM SERPL-SCNC: 141 MMOL/L (ref 136–145)
SP GR UR STRIP.AUTO: 1.03
SQUAMOUS #/AREA URNS AUTO: ABNORMAL /HPF
UROBILINOGEN UR STRIP.AUTO-MCNC: NORMAL MG/DL
WBC # BLD AUTO: 7 X10*3/UL (ref 4.4–11.3)
WBC #/AREA URNS AUTO: >50 /HPF

## 2024-11-09 PROCEDURE — 83690 ASSAY OF LIPASE: CPT | Performed by: EMERGENCY MEDICINE

## 2024-11-09 PROCEDURE — 85025 COMPLETE CBC W/AUTO DIFF WBC: CPT | Performed by: EMERGENCY MEDICINE

## 2024-11-09 PROCEDURE — 80053 COMPREHEN METABOLIC PANEL: CPT | Performed by: EMERGENCY MEDICINE

## 2024-11-09 PROCEDURE — 99284 EMERGENCY DEPT VISIT MOD MDM: CPT | Mod: 25

## 2024-11-09 PROCEDURE — 81001 URINALYSIS AUTO W/SCOPE: CPT | Performed by: EMERGENCY MEDICINE

## 2024-11-09 PROCEDURE — 84702 CHORIONIC GONADOTROPIN TEST: CPT | Performed by: EMERGENCY MEDICINE

## 2024-11-09 PROCEDURE — 96365 THER/PROPH/DIAG IV INF INIT: CPT

## 2024-11-09 PROCEDURE — 36415 COLL VENOUS BLD VENIPUNCTURE: CPT | Performed by: EMERGENCY MEDICINE

## 2024-11-09 PROCEDURE — 2500000004 HC RX 250 GENERAL PHARMACY W/ HCPCS (ALT 636 FOR OP/ED): Performed by: EMERGENCY MEDICINE

## 2024-11-09 RX ORDER — CEFTRIAXONE 1 G/50ML
1 INJECTION, SOLUTION INTRAVENOUS ONCE
Status: COMPLETED | OUTPATIENT
Start: 2024-11-09 | End: 2024-11-09

## 2024-11-09 RX ORDER — SULFAMETHOXAZOLE AND TRIMETHOPRIM 800; 160 MG/1; MG/1
1 TABLET ORAL EVERY 12 HOURS
Qty: 10 TABLET | Refills: 0 | Status: SHIPPED | OUTPATIENT
Start: 2024-11-09 | End: 2024-11-14

## 2024-11-09 RX ADMIN — CEFTRIAXONE SODIUM 1 G: 1 INJECTION, SOLUTION INTRAVENOUS at 21:33

## 2024-11-09 ASSESSMENT — PAIN - FUNCTIONAL ASSESSMENT: PAIN_FUNCTIONAL_ASSESSMENT: 0-10

## 2024-11-09 ASSESSMENT — PAIN DESCRIPTION - LOCATION: LOCATION: ABDOMEN

## 2024-11-09 ASSESSMENT — PAIN DESCRIPTION - ORIENTATION: ORIENTATION: LOWER

## 2024-11-09 ASSESSMENT — PAIN SCALES - GENERAL: PAINLEVEL_OUTOF10: 8

## 2024-11-09 NOTE — TELEPHONE ENCOUNTER
Kirsty called and said she has 8/10 lower abdominal pain that started about an hour ago. She said more intense pain LLQ. Leuprolide injection on 11/5. She said she also has chills but temp on phone was 97.5. She had normal B/M this morning and urinating normally. I told her if she is having 8/10 abdominal pain that has worsened in the last hour she would need to be evaluated at the ER. She agreed and wants to go to the ER. She is going to [facility] and I will notify them through [facility] with estimated arrival time. Messaged team to make aware of call and secured chat fellow to make him aware of call.

## 2024-11-09 NOTE — TELEPHONE ENCOUNTER
Received an after hour page regarding stomach cramping. I will call for more information.       Additional Information  • Commented on: Where is the pain? Is there more than one place where you're having pain?     8/10 pain. Lower abdominal pain and no bloating. LL side hurts worse and pressure.  • Commented on: How long have they been going on?     Within last hour pain started. Last B/M was earlier today and was normal. Eating and drinking and urinating normally. Denies fever. Took on phone 97.4. Pt does have chills with the pain. Denies any Chest Pain or SOB  • Commented on: What helps these problems?     No medication taken  • Commented on: Is there a time of day when the pain is better or worse?     Just started in last hour.  • Commented on: Are you having any of these problems?     Denies all of these symptoms.  • Commented on: Do you have any of these problems? (Assess for signs/symptoms of spinal cord compression)     Denies all of these symptoms    Protocols used: Pain

## 2024-11-10 NOTE — DISCHARGE INSTRUCTIONS
In the emergency department, we got blood work that was reassuring.  Your urine does appear to be infected, and we are prescribing you Bactrim.  Please take this with a full 5-day course.  You can use Tylenol or Motrin for pain.  I did give you a dose of antibiotics in the ER.  Please return to the emergency department if your pain becomes severe, you develop a fever over 101 degrees, the pain moves to your back, or you have any new or concerning symptoms.

## 2024-11-10 NOTE — ED PROVIDER NOTES
HPI   Chief Complaint   Patient presents with    Abdominal Pain       34-year-old medical history presenting with suprapubic abdominal pain.  Having some polyuria, there is no radiation of the pain.  No vaginal discharge or bleeding.  No flank pain or fever.  No chest pain, nausea, vomiting, diarrhea.  Has had a prior tummy tuck.      History provided by:  Patient          Patient History   Past Medical History:   Diagnosis Date    Breast cancer (Multi)     Left, recurrent    History of gestational diabetes     CHRISTA (obstructive sleep apnea)     PONV (postoperative nausea and vomiting)      Past Surgical History:   Procedure Laterality Date    BI US GUIDED BREAST LOCALIZATION AND BIOPSY LEFT Left 2023    BI US GUIDED BREAST LOCALIZATION AND BIOPSY LEFT 2023 Briana Bauer MD [facility] [place] [facility]    BREAST LUMPECTOMY Left 2024    With left SLNB    BREAST LUMPECTOMY Left 2024    Re-excision for positive margins    BREAST RECONSTRUCTION  2023    Second stage, abdominoplasty    BREAST RECONSTRUCTION  2024    Left Revision    CARPAL TUNNEL RELEASE Bilateral      SECTION, LOW TRANSVERSE      LAPAROTOMY SALPINGO OOPHORECTOMY Right 2022    MASTECTOMY Bilateral 2022    Left SLNB, immediate reconstruction    OVARIAN CYST SURGERY  10/21/2020     No family history on file.  Social History     Tobacco Use    Smoking status: Never     Passive exposure: Never    Smokeless tobacco: Never   Vaping Use    Vaping status: Not on file   Substance Use Topics    Alcohol use: Yes     Comment: Socially    Drug use: Never       Physical Exam   ED Triage Vitals [24 1913]   Temperature Heart Rate Respirations BP   36.9 °C (98.5 °F) 74 18 141/84      Pulse Ox Temp Source Heart Rate Source Patient Position   100 % Oral Monitor --      BP Location FiO2 (%)     -- --       Physical Exam  Vitals and nursing note reviewed.   Constitutional:       Appearance: She is well-developed.   HENT:       Head: Normocephalic.   Cardiovascular:      Rate and Rhythm: Normal rate and regular rhythm.   Pulmonary:      Effort: Pulmonary effort is normal.      Breath sounds: Normal breath sounds.   Abdominal:      General: Abdomen is flat. Bowel sounds are normal.      Palpations: Abdomen is soft.      Tenderness: There is abdominal tenderness in the suprapubic area. There is no right CVA tenderness, left CVA tenderness, guarding or rebound.      Hernia: No hernia is present.   Skin:     General: Skin is warm.      Capillary Refill: Capillary refill takes less than 2 seconds.   Neurological:      General: No focal deficit present.      Mental Status: She is alert.   Psychiatric:         Mood and Affect: Mood normal.           ED Course & MDM   Diagnoses as of 11/09/24 2354   UTI (urinary tract infection), bacterial                 No data recorded     Beth Coma Scale Score: 15 (11/09/24 1953 : Mally Cruz, GERMÁN)                           Medical Decision Making  34-year-old female is presenting with lower abdominal cramping and suprapubic pain.  Lab work is reassuring, pregnancy is negative.  UA is positive for infection, given ceftriaxone in the ER and will discharge with Bactrim.  I low suspicion for pyelonephritis, she appears comfortable.  Discharged in stable condition with good return precautions.        Procedure  Procedures     Meliton Martinez MD  11/09/24 0838

## 2024-11-10 NOTE — ED TRIAGE NOTES
Patient having pressure, cramping, and pain in her lower abdomin. All of this began roughly one hour prior to arriving to the ED. Patient experiencing urinary frequency as well.

## 2024-12-03 ENCOUNTER — APPOINTMENT (OUTPATIENT)
Dept: HEMATOLOGY/ONCOLOGY | Facility: HOSPITAL | Age: 34
End: 2024-12-03
Payer: COMMERCIAL

## 2024-12-11 ENCOUNTER — OFFICE VISIT (OUTPATIENT)
Dept: HEMATOLOGY/ONCOLOGY | Facility: HOSPITAL | Age: 34
End: 2024-12-11
Payer: COMMERCIAL

## 2024-12-11 VITALS
DIASTOLIC BLOOD PRESSURE: 72 MMHG | OXYGEN SATURATION: 95 % | RESPIRATION RATE: 17 BRPM | TEMPERATURE: 97.7 F | HEART RATE: 95 BPM | BODY MASS INDEX: 37.89 KG/M2 | SYSTOLIC BLOOD PRESSURE: 116 MMHG | HEIGHT: 62 IN | WEIGHT: 205.91 LBS

## 2024-12-11 DIAGNOSIS — C50.812 MALIGNANT NEOPLASM OF OVERLAPPING SITES OF LEFT BREAST IN FEMALE, ESTROGEN RECEPTOR POSITIVE: Primary | ICD-10-CM

## 2024-12-11 DIAGNOSIS — Z85.3 HISTORY OF MALIGNANT NEOPLASM OF BREAST: ICD-10-CM

## 2024-12-11 DIAGNOSIS — Z17.0 MALIGNANT NEOPLASM OF OVERLAPPING SITES OF LEFT BREAST IN FEMALE, ESTROGEN RECEPTOR POSITIVE: Primary | ICD-10-CM

## 2024-12-11 PROCEDURE — 99214 OFFICE O/P EST MOD 30 MIN: CPT | Performed by: INTERNAL MEDICINE

## 2024-12-11 PROCEDURE — 1036F TOBACCO NON-USER: CPT | Performed by: INTERNAL MEDICINE

## 2024-12-11 PROCEDURE — 3008F BODY MASS INDEX DOCD: CPT | Performed by: INTERNAL MEDICINE

## 2024-12-11 PROCEDURE — 3074F SYST BP LT 130 MM HG: CPT | Performed by: INTERNAL MEDICINE

## 2024-12-11 PROCEDURE — 3078F DIAST BP <80 MM HG: CPT | Performed by: INTERNAL MEDICINE

## 2024-12-11 ASSESSMENT — PAIN SCALES - GENERAL: PAINLEVEL_OUTOF10: 0-NO PAIN
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
## 2024-12-13 ENCOUNTER — OFFICE VISIT (OUTPATIENT)
Dept: PRIMARY CARE | Facility: CLINIC | Age: 34
End: 2024-12-13
Payer: COMMERCIAL

## 2024-12-13 VITALS
BODY MASS INDEX: 38.02 KG/M2 | DIASTOLIC BLOOD PRESSURE: 76 MMHG | WEIGHT: 206.6 LBS | TEMPERATURE: 97.2 F | HEART RATE: 64 BPM | SYSTOLIC BLOOD PRESSURE: 128 MMHG | HEIGHT: 62 IN

## 2024-12-13 DIAGNOSIS — Z11.7 ENCOUNTER FOR TESTING FOR LATENT TUBERCULOSIS: ICD-10-CM

## 2024-12-13 DIAGNOSIS — Z76.89 ENCOUNTER TO ESTABLISH CARE WITH NEW DOCTOR: ICD-10-CM

## 2024-12-13 DIAGNOSIS — Z00.00 NORMAL PHYSICAL EXAM: ICD-10-CM

## 2024-12-13 DIAGNOSIS — L73.2 HIDRADENITIS SUPPURATIVA OF LEFT AXILLA: ICD-10-CM

## 2024-12-13 DIAGNOSIS — Z85.3 HISTORY OF MALIGNANT NEOPLASM OF BREAST: ICD-10-CM

## 2024-12-13 DIAGNOSIS — E88.810 METABOLIC SYNDROME: Primary | ICD-10-CM

## 2024-12-13 PROBLEM — F32.A DEPRESSION: Status: RESOLVED | Noted: 2023-10-12 | Resolved: 2024-12-13

## 2024-12-13 PROBLEM — O16.9 HYPERTENSION COMPLICATING PREGNANCY (HHS-HCC): Status: RESOLVED | Noted: 2024-01-12 | Resolved: 2024-12-13
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
## 2024-12-13 PROBLEM — Z86.32 HISTORY OF GESTATIONAL DIABETES MELLITUS (GDM): Status: RESOLVED | Noted: 2024-01-12 | Resolved: 2024-12-13

## 2024-12-13 PROBLEM — G56.00 CARPAL TUNNEL SYNDROME: Status: RESOLVED | Noted: 2023-10-12 | Resolved: 2024-12-13

## 2024-12-13 PROBLEM — A74.9 INFECTION DUE TO CHLAMYDIA SPECIES: Status: RESOLVED | Noted: 2024-01-12 | Resolved: 2024-12-13

## 2024-12-13 PROBLEM — Z86.19 HISTORY OF VARICELLA: Status: RESOLVED | Noted: 2024-01-12 | Resolved: 2024-12-13

## 2024-12-13 PROBLEM — F41.9 ANXIETY: Status: RESOLVED | Noted: 2023-10-12 | Resolved: 2024-12-13
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
## 2024-12-13 PROCEDURE — 1036F TOBACCO NON-USER: CPT | Performed by: STUDENT IN AN ORGANIZED HEALTH CARE EDUCATION/TRAINING PROGRAM

## 2024-12-13 PROCEDURE — 99214 OFFICE O/P EST MOD 30 MIN: CPT | Performed by: STUDENT IN AN ORGANIZED HEALTH CARE EDUCATION/TRAINING PROGRAM

## 2024-12-13 PROCEDURE — 3008F BODY MASS INDEX DOCD: CPT | Performed by: STUDENT IN AN ORGANIZED HEALTH CARE EDUCATION/TRAINING PROGRAM

## 2024-12-13 RX ORDER — CLINDAMYCIN PHOSPHATE 10 MG/G
GEL TOPICAL DAILY
Qty: 60 G | Refills: 5 | Status: SHIPPED | OUTPATIENT
Start: 2024-12-13 | End: 2025-12-13

## 2024-12-13 ASSESSMENT — ENCOUNTER SYMPTOMS
FATIGUE: 0
DEPRESSION: 0
DIARRHEA: 0
ADENOPATHY: 0
FEVER: 0
COUGH: 0
OCCASIONAL FEELINGS OF UNSTEADINESS: 0
COLOR CHANGE: 0
WHEEZING: 0
CONSTIPATION: 0
LOSS OF SENSATION IN FEET: 0
ABDOMINAL PAIN: 0
CHILLS: 0
VOMITING: 0
SHORTNESS OF BREATH: 0
NAUSEA: 0
DIFFICULTY URINATING: 0
DIZZINESS: 0
HEADACHES: 0

## 2024-12-13 NOTE — ASSESSMENT & PLAN NOTE
Chronic issue, stable, initial encounter. Will recheck A1c and lipids on next physical. Discussed diet and lifestyle changes.

## 2024-12-13 NOTE — PROGRESS NOTES
"  [facility] - Primary Care  [address], [address]  [place] [address]    Subjective     Patient ID: Kirsty Shrestha is a 34 y.o. female who presents for  [facility]     Coming to [facility].    Has hx of breast cancer with reconstructive surgery, is following with her oncology team still, and her surgeon.    Has HS, no meds for it. Having a cyst from it in the axillae.    Has some metabolic syndrome. A1c was 5.7%. had some borderline high LDL, total cholesterol normal.    She needed a form filled for a health assessment for her job. Needed a TB test. Stated she had a positive PDD once and needed a chest XRAY but it was negative. Says they usually have her do the blood test. She is able to bend, reach and lift without any issues. No issues with walking.        Review of Systems   Constitutional:  Negative for chills, fatigue and fever.   HENT:  Negative for congestion.    Eyes:  Negative for visual disturbance.   Respiratory:  Negative for cough, shortness of breath and wheezing.    Cardiovascular:  Negative for chest pain.   Gastrointestinal:  Negative for abdominal pain, constipation, diarrhea, nausea and vomiting.   Genitourinary:  Negative for difficulty urinating.   Musculoskeletal:  Negative for gait problem.   Skin:  Negative for color change.   Neurological:  Negative for dizziness, syncope and headaches.   Hematological:  Negative for adenopathy.       Social History     Tobacco Use    Smoking status: Never     Passive exposure: Never    Smokeless tobacco: Never   Substance Use Topics    Alcohol use: Yes     Comment: Socially    Drug use: Never     No family history on file.  No Known Allergies    /76 (BP Location: Right arm, Patient Position: Sitting, BP Cuff Size: Large adult)   Pulse 64   Temp 36.2 °C (97.2 °F) (Temporal)   Ht 1.575 m (5' 2\")   Wt 93.7 kg (206 lb 9.6 oz)   BMI 37.79 kg/m²      Objective   Physical Exam  Vitals and nursing note reviewed. " "  Constitutional:       Appearance: Normal appearance.   Eyes:      Extraocular Movements: Extraocular movements intact.      Conjunctiva/sclera: Conjunctivae normal.      Pupils: Pupils are equal, round, and reactive to light.   Cardiovascular:      Rate and Rhythm: Normal rate and regular rhythm.   Pulmonary:      Effort: Pulmonary effort is normal.      Breath sounds: Normal breath sounds.   Abdominal:      General: Abdomen is flat. Bowel sounds are normal.      Palpations: Abdomen is soft.   Musculoskeletal:         General: Normal range of motion.      Cervical back: Normal range of motion and neck supple.   Skin:     General: Skin is warm and dry.      Capillary Refill: Capillary refill takes less than 2 seconds.   Neurological:      General: No focal deficit present.      Mental Status: She is alert and oriented to person, place, and time.   Psychiatric:         Mood and Affect: Mood normal.         Behavior: Behavior normal.         Thought Content: Thought content normal.           Labs:   Lab Results   Component Value Date    WBC 7.0 11/09/2024    HGB 12.2 11/09/2024    HCT 35.9 (L) 11/09/2024     11/09/2024    TSH 0.26 (L) 07/15/2024    INR 1.0 05/19/2023     Lab Results   Component Value Date     11/09/2024    K 3.5 11/09/2024     (H) 11/09/2024    BUN 11 11/09/2024    CREATININE 0.85 11/09/2024    GLUCOSE 105 (H) 11/09/2024    CALCIUM 8.6 11/09/2024    PROT 7.2 11/09/2024    BILITOT 0.3 11/09/2024    ALKPHOS 76 11/09/2024    AST 20 11/09/2024    ALT 18 11/09/2024     Lab Results   Component Value Date    CHOL 198 07/15/2024    CHOL 175 10/17/2023      Lab Results   Component Value Date    TRIG 103 10/17/2023      Lab Results   Component Value Date    HDL 44.7 07/15/2024    HDL 54.0 10/17/2023     Lab Results   Component Value Date    LDLCALC 100 (H) 10/17/2023      Lab Results   Component Value Date    VLDL 21 10/17/2023    No components found for: \"CHOLHDLRATI0\"    Imaging/Testing: " BI US breast limited left  Narrative: Interpreted By:  Jose Ramirez,   STUDY:  BI US BREAST LIMITED LEFT;  10/22/2024 12:26 pm      ACCESSION NUMBER(S):  [id number]      ORDERING CLINICIAN:  JOMAR ASHFORD      INDICATION:  History of bilateral mastectomies with reconstruction. Patient  presents for evaluation of left breast swelling and a palpable lump  in left axilla.      ,Z85.3 Personal history of malignant neoplasm of breast      COMPARISON:  CT radiation planning scan from 07/15/2024      FINDINGS:  Targeted ultrasound was performed of the left mastectomy bed and left  axilla by a registered sonographer with elastography. In the  patient's areas of swelling in the left breast, diffuse skin  thickening and underlying edema is seen without other focal  sonographic abnormalities. This is consistent with post radiation  change. In the patient's area of palpable concern in the left axilla,  there is a heterogeneous hypoechoic collection within the skin with  surrounding vascularity which is soft on elastography. This likely  represents a small skin infection but the collection does not appear  drainable at this time.      Impression: Small fluid collection within the skin of the left axilla in the  patient's area of palpable concern which likely represents an early  phlegmon/developing abscess. This does not appear drainable at this  time. Short-term follow-up ultrasound in 3 months is recommended to  ensure resolution. Repeat imaging can be performed if this area  becomes larger or more inflamed.      Diffuse skin thickening and underlying edema within the left breast  may be related to post radiation change or early infection. Clinical  follow-up is recommended.      Findings were discussed with Dr. Ashford who has started the patient on  antibiotics.      BI-RADS CATEGORY:  BI-RADS Category:  3 Probably Benign.  Recommendation:  Clinical Follow-up and Short-term Interval Follow-up  Imaging. Recommended Date:  3  Months.  Laterality:  Left.      For any future breast imaging appointments, please call [phone]).          MACRO:  None      Signed by: Jose Ramirez 10/22/2024 4:12 PM  Dictation workstation:   [id number]    Assessment/Plan   Problem List Items Addressed This Visit       Hidradenitis suppurativa of left axilla     Chronic issue, uncontrolled, initial encounter. Will try clinda gel as it is a localized area.         Relevant Medications    clindamycin (Cleocin T) 1 % gel    History of malignant neoplasm of breast     Initial encounter. Continue with follow ups with her oncology team and surgery team.          Metabolic syndrome - Primary     Chronic issue, stable, initial encounter. Will recheck A1c and lipids on next physical. Discussed diet and lifestyle changes.           Other Visit Diagnoses       Encounter to establish care with new doctor        Relevant Orders    Referral to Obstetrics / Gynecology    Laboratory examination ordered as part of a routine general medical examination        Normal physical exam        Encounter for testing for latent tuberculosis        Relevant Orders    T-Spot TB            Patient and I discussed diet/nutrition, lifestyle modifications, safety, medication indications and side effects, and health goals.    current treatment plan is effective, no change in therapy, orders and follow up as documented in EMR, lab results reviewed with patient, reviewed compliance with lifestyle measures, reviewed diet, exercise and weight control, reviewed medications and side effects in detail     Return visit in 6 months. For yearly physical exam.         We will fill out her form and attached the TB test results to it once they are completed. Advised her to come  the filled form once we have the test results.    ROMAINE CARRASCO MD, [facility]  Department of Family Medicine of [facility] - Primary Care

## 2024-12-17 ENCOUNTER — LAB (OUTPATIENT)
Dept: LAB | Facility: LAB | Age: 34
End: 2024-12-17
Payer: COMMERCIAL

## 2024-12-17 DIAGNOSIS — Z11.7 ENCOUNTER FOR TESTING FOR LATENT TUBERCULOSIS: ICD-10-CM

## 2024-12-17 PROCEDURE — 36415 COLL VENOUS BLD VENIPUNCTURE: CPT

## 2024-12-17 PROCEDURE — 86481 TB AG RESPONSE T-CELL SUSP: CPT

## 2024-12-19 LAB
NIL(NEG) CONTROL SPOT COUNT: ABNORMAL
PANEL A SPOT COUNT: 18
PANEL B SPOT COUNT: 5
POS CONTROL SPOT COUNT: ABNORMAL
T-SPOT. TB INTERPRETATION: POSITIVE

## 2024-12-20 ENCOUNTER — OFFICE VISIT (OUTPATIENT)
Dept: OBSTETRICS AND GYNECOLOGY | Facility: CLINIC | Age: 34
End: 2024-12-20
Payer: COMMERCIAL

## 2024-12-20 VITALS
SYSTOLIC BLOOD PRESSURE: 122 MMHG | DIASTOLIC BLOOD PRESSURE: 78 MMHG | BODY MASS INDEX: 38.39 KG/M2 | HEIGHT: 62 IN | WEIGHT: 208.6 LBS

## 2024-12-20 DIAGNOSIS — N76.0 ACUTE VAGINITIS: Primary | ICD-10-CM

## 2024-12-20 DIAGNOSIS — Z12.4 CERVICAL CANCER SCREENING: ICD-10-CM

## 2024-12-20 DIAGNOSIS — B37.31 YEAST VAGINITIS: ICD-10-CM

## 2024-12-20 DIAGNOSIS — R76.11 POSITIVE TB TEST: Primary | ICD-10-CM

## 2024-12-20 DIAGNOSIS — Z76.89 ENCOUNTER TO ESTABLISH CARE WITH NEW DOCTOR: ICD-10-CM

## 2024-12-20 PROCEDURE — 87205 SMEAR GRAM STAIN: CPT

## 2024-12-20 PROCEDURE — 3008F BODY MASS INDEX DOCD: CPT | Performed by: ADVANCED PRACTICE MIDWIFE

## 2024-12-20 PROCEDURE — 99214 OFFICE O/P EST MOD 30 MIN: CPT | Performed by: ADVANCED PRACTICE MIDWIFE

## 2024-12-20 RX ORDER — FLUCONAZOLE 150 MG/1
150 TABLET ORAL ONCE
Qty: 2 TABLET | Refills: 1 | Status: SHIPPED | OUTPATIENT
Start: 2024-12-20 | End: 2024-12-21 | Stop reason: SDUPTHER

## 2024-12-20 NOTE — PROGRESS NOTES
Subjective   Kirsty Shrestha is a 34 y.o. female who complains of vaginal discharge/itch.    HPI:  Symptoms reported: vaginal discharge and vaginal itching  Onset:  Wednesday  Course: constant  Progression: worsened    Helpful treatments: none  Unhelpful treatments tried: none    Objective   Physical Exam:   General Appearance: alert and oriented, in no acute distress  Abdomen: soft, non-tender; bowel sounds normal; no masses, no organomegaly  Pelvic Exam: external genitalia normal, vagina normal without discharge, uterus normal size, shape, and consistency, no cervical motion tenderness, cervix normal in appearance, no adnexal masses or tenderness, and rectovaginal septum normal  Urine dipstick: not done.    Assessment/Plan   Diagnoses and all orders for this visit:  Acute vaginitis  -     Vaginitis Gram Stain For Bacterial Vaginosis + Yeast; Future  Encounter to establish care with new doctor  -     Referral to Obstetrics / Gynecology  Cervical cancer screening  -     THINPREP PAP TEST (>30)  Yeast vaginitis  -     fluconazole (Diflucan) 150 mg tablet; Take 1 tablet (150 mg) by mouth 1 time for 1 dose.

## 2024-12-21 LAB
CLUE CELLS VAG LPF-#/AREA: ABNORMAL /[LPF]
NUGENT SCORE: 0
YEAST VAG WET PREP-#/AREA: PRESENT

## 2024-12-21 RX ORDER — FLUCONAZOLE 150 MG/1
150 TABLET ORAL ONCE
Qty: 2 TABLET | Refills: 1 | Status: SHIPPED | OUTPATIENT
Start: 2024-12-21 | End: 2024-12-21

## 2024-12-31 ENCOUNTER — APPOINTMENT (OUTPATIENT)
Dept: PRIMARY CARE | Facility: CLINIC | Age: 34
End: 2024-12-31
Payer: COMMERCIAL

## 2025-01-03 ENCOUNTER — PREP FOR PROCEDURE (OUTPATIENT)
Dept: SURGERY | Facility: HOSPITAL | Age: 35
End: 2025-01-03
Payer: COMMERCIAL

## 2025-01-06 ENCOUNTER — APPOINTMENT (OUTPATIENT)
Dept: PLASTIC SURGERY | Facility: CLINIC | Age: 35
End: 2025-01-06
Payer: COMMERCIAL

## 2025-01-06 VITALS — HEIGHT: 62 IN | BODY MASS INDEX: 38.28 KG/M2 | WEIGHT: 208 LBS

## 2025-01-06 DIAGNOSIS — Z42.1 ENCOUNTER FOR BREAST RECONSTRUCTION FOLLOWING MASTECTOMY: Primary | ICD-10-CM

## 2025-01-06 PROCEDURE — 99213 OFFICE O/P EST LOW 20 MIN: CPT | Performed by: SURGERY

## 2025-01-06 PROCEDURE — 3008F BODY MASS INDEX DOCD: CPT | Performed by: SURGERY

## 2025-01-06 NOTE — PROGRESS NOTES
Department of Plastic and Reconstructive Surgery            Post Operative Visit    Date: 01/06/25  Date of Surgery: 2/26/24    Subjective   Kirsty Shrestha is a 34 y.o. female who presents for POV. They are s/p left delayed reconstruction 610cc saline IDEAL implant placement on 2/26/24.      She has been following with her medical oncology team, she was noted to have a fluid collection on imaging that was not drainable and clinically treated as an abscess.  She started on Augmentin, patient followed up in December with no new complaints.  During the time of which she had symptoms, she endorses increased swelling and swelling that tracked into the armpits.  This went away after the Augmentin was started.    She never had any erythema and she never had any other symptoms such as fever, tachycardia or nausea.        Objective   Vital Signs:   There were no vitals filed for this visit.      Gen: interactive and pleasant  Head: NCAT  Eyes: EOMI, PERRLA  Mouth: MMM  Throat: trachea midline  Cor: RRR  Pulm: nonlabored breathing  Abd: s/nt/nd  Neuro: AAOx3  Ext: extremities perfused    Focused exam of the: breast    Left breast appears to have notable radiation-induced changes such as fibrosis mild, and some tightness, she has Baker grade 2 capsule contracture  No obvious ballotable fluid collections, no erythema, no hyperemia, no rubor or calor today    Assessment/Plan     Kirsty Shrestha is a 34 y.o. female who presents for POV. They are s/p left delayed reconstruction 610cc saline IDEAL implant placement on 2/26/24.    She presents today for POV.  She has recovered from an indolent episode of edema of the breast and axilla.  We indicated that there may be some inflammation versus possible indolent infection versus possible breakdown of mesh.  At this time she is asymptomatic, we recommended continued monitoring, we will trial antibiotics if this does happen again, and we recommend that she  present to see us for physical exam if this does happen again.      Plan:      Follow-up as needed as above      I spent 20 minutes with this patient. Greater than 50% of this time was spent in the counselling and/or coordination of care of this patient.  This note was created using voice recognition software and was not corrected for typographical or grammatical errors.    Signature: Galileo Barker MD  Date: 01/06/25

## 2025-01-08 LAB
CYTOLOGY CMNT CVX/VAG CYTO-IMP: NORMAL
HPV HR 12 DNA GENITAL QL NAA+PROBE: NEGATIVE
HPV HR GENOTYPES PNL CVX NAA+PROBE: NEGATIVE
HPV16 DNA SPEC QL NAA+PROBE: NEGATIVE
HPV18 DNA SPEC QL NAA+PROBE: NEGATIVE
LAB AP HISTORY OF MALIGNANCY: NORMAL
LAB AP HPV GENOTYPE QUESTION: YES
LAB AP HPV HR: NORMAL
LAB AP TREATMENT HISTORY: NORMAL
LABORATORY COMMENT REPORT: NORMAL
PATH REPORT.TOTAL CANCER: NORMAL

## 2025-01-10 ENCOUNTER — SOCIAL WORK (OUTPATIENT)
Dept: CASE MANAGEMENT | Facility: HOSPITAL | Age: 35
End: 2025-01-10

## 2025-01-10 ENCOUNTER — OFFICE VISIT (OUTPATIENT)
Dept: HEMATOLOGY/ONCOLOGY | Facility: HOSPITAL | Age: 35
End: 2025-01-10
Payer: COMMERCIAL

## 2025-01-10 VITALS
BODY MASS INDEX: 38.79 KG/M2 | RESPIRATION RATE: 20 BRPM | OXYGEN SATURATION: 100 % | HEART RATE: 78 BPM | SYSTOLIC BLOOD PRESSURE: 140 MMHG | WEIGHT: 212.08 LBS | DIASTOLIC BLOOD PRESSURE: 90 MMHG | TEMPERATURE: 98.4 F

## 2025-01-10 DIAGNOSIS — Z17.0 MALIGNANT NEOPLASM OF OVERLAPPING SITES OF BREAST IN FEMALE, ESTROGEN RECEPTOR POSITIVE, UNSPECIFIED LATERALITY: Primary | ICD-10-CM

## 2025-01-10 DIAGNOSIS — C50.819 MALIGNANT NEOPLASM OF OVERLAPPING SITES OF BREAST IN FEMALE, ESTROGEN RECEPTOR POSITIVE, UNSPECIFIED LATERALITY: Primary | ICD-10-CM

## 2025-01-10 DIAGNOSIS — Z91.89 AT RISK FOR INFERTILITY: ICD-10-CM

## 2025-01-10 DIAGNOSIS — Z51.5 ENCOUNTER FOR PALLIATIVE CARE: ICD-10-CM

## 2025-01-10 PROCEDURE — 99215 OFFICE O/P EST HI 40 MIN: CPT | Performed by: NURSE PRACTITIONER

## 2025-01-10 PROCEDURE — 1036F TOBACCO NON-USER: CPT | Performed by: NURSE PRACTITIONER

## 2025-01-10 ASSESSMENT — ENCOUNTER SYMPTOMS
CHILLS: 0
FATIGUE: 0
HOT FLASHES: 1
CONFUSION: 0
DEPRESSION: 0
FEVER: 0
NERVOUS/ANXIOUS: 0

## 2025-01-10 ASSESSMENT — PAIN SCALES - GENERAL: PAINLEVEL_OUTOF10: 0-NO PAIN

## 2025-01-10 NOTE — PROGRESS NOTES
Patient ID: Kirsty Shrestha is a 34 y.o. female.  Referring Physician: No referring provider defined for this encounter.  Primary Care Provider: Shruthi Bullard MD  Oncologic History:  4/8/22: Dx MG/US performed. A 1.3 x 0.6 x 0.7 cm hypoechoic mass seen in left breast (11:00, 13 cm FN). An additional 0.9 cm mass at 2:00 (8 cm FN) was also seen, possibly benign. ALN’s appeared unremarkable.  4/20/22: Left breast mass biopsy (11:00) showed IDC, grade 3. ER >95%, OH >95%, Her2-negative (2+ IHC; MAHNAZ ratio 1.9, copy #3.6). Mammaprint high-risk (-0.615), luminal B type. Maximum diameter 0.7 cm.  4/28/22: Biopsy of 2:00 lesion benign. Left ALN negative.  Genetic Testing completed. Negative  5/23/2023 bilateral stage 2 breast recosntruction with removal of TE and placement of new IDEA: saline implants 610cc with autologous fat grafting of 160cc per breast and abdominoplasty with rectus plication and umbilical transposition   7/14/22: Bilateral mastectomy with implant reconstruction and left SLNBx performed. No residual carcinoma seen in left breast. Right breast benign. 0/1 left SLN’s involved. pT1bN0 (IA).  08/18/2022: Started on Tamoxifen 20 mg daily by Dr. Connell. Patient did not follow-up with medical oncology after that and stopped Tamoxifen after her prescription run out  12/22/2023: Patient underwent an US-guided left breast biopsy of a cystic mass at 12:00. Pathology was consistent with Invasive ductal carcinoma, grade 3; ER greater than 95%, OH greater than 95%, HER2 negative   1/31/2024: Patient underwent left breast lumpectomy/wide excision of breast cancer and SLNB. Pathology revealed multifocal disease with 4 separate nodules measuring 32, 11, 7 and 4 mm respectively with 0/1 SLNs involved. With invasive cancer present at lateral margin, within microns of posterior margin and <1mm of anterior and medial margins. Repeat receptor of mass at 12:00 showed Estrogen Receptor positive at 95%, Progesteron receptor at  >95% and her2 equivocal and not amplified at 1.1    02/26/2024: Patient underwent re-excision  04/16/2024: oocyte retrieval, 8 mature oocytes cryopreserved  04/23/2024: Cycle #1 of TC attempted and discontinued due to hypersensitivity reaction  07/16/2024: Received the last cycle of TC  07/29-08/20/2024 radiation therapy to L breast, total dose [id number]    Subjective    Ms. Shrestha is a pleasant 34 y.o. premenopausal AA female with prior Stage: IA (pT1b pN0) invasive ductal carcinoma of the left breast; ER >95%, OK >95%, Her2-negative, Mammaprint: High-risk, luminal B, -0.615. Now with locally recurrent left invasive ductal carcinoma, stage IB (rPT2, pN0 M0).  The patient's recurrent breast cancer was diagnosed on December 22, 2023, and is grade 3, Estrogen Receptor positive at 95%, Progesteron receptor at >95% and her2 equivocal.    Returns today in follow up regarding issues unique to young adults with cancer.     She remains on oral endocrine therapy, tamoxifen began 08/2024 and ovarian suppression therapy with leuprolide 3mo dose kit.    In the interim since our last visit she has noticed that her hot flashes are much worse and bothersome. Hot flashes occur multiple times/day and night, last for 3-5 minutes, at times result in drenching sweats, keep her from sleeping at night, already using supportive measures, fan, layers, sleeping with window open. Now interested in taking medication to help manage hot flashes.     Despite being on ovarian suppression/tamoxifen she is experiencing menses. LMP 12/2024. Is sexually active with male partner, has IUD for contraception. Denies problems with dysparaneuria or vaginal dryness, does notice some decreased interest that is not bothersome or impacting her relationship.     Prior L breast swelling/pain and infection now resolved. Physically feeling well, continues to exercise several times/week, has continued to lose weight intentionally. Is eating well-balanced diet.      Completed course and took certification exam for [employer]. Not currently working, will look for jobs once she has confirmed that she passed examinations. Still intermittently working in construction. Does note challenges with finances, has never met LISW regarding financial resources/supports.     Emotionally, she reports feeling. Feels optimistic about the future. Denies concerns with anxiety, excessive worry, or depressed thoughts. Continues to feel that her daughter is well supported, engaged with [facility] and has support from family and friends.    Social History: Lives in [place] with daughter 12 y.o. Not currently working, previously worked in construction. In a relationship. Grew up in the area and has family nearby. Does not smoke, vape, or use recreational drugs. Previously drank 5 drinks/week. Now not using ETOH.  Review of Systems   Constitutional:  Negative for chills, fatigue and fever.   Endocrine: Positive for hot flashes.   Genitourinary:  Negative for menstrual problem and pelvic pain.    Psychiatric/Behavioral:  Negative for confusion and depression. The patient is not nervous/anxious.       Objective   BSA: 2.05 meters squared  /90 (BP Location: Right arm, Patient Position: Sitting, BP Cuff Size: Large adult)   Pulse 78   Temp 36.9 °C (98.4 °F) (Temporal)   Resp 20   Wt 96.2 kg (212 lb 1.3 oz)   LMP  (LMP Unknown) Comment: 1 month ago  SpO2 100%   BMI 38.79 kg/m² Weight and VS reviewed in oncology encounter  Performance Status:  Asymptomatic    Current Outpatient Medications   Medication Instructions    clindamycin (Cleocin T) 1 % gel Topical, Daily, apply to affected area    fexofenadine-pseudoephedrine (Allegra-D 24) 180-240 mg 24 hr tablet 1 tablet, Daily    levonorgestrel (Mirena) 21 mcg/24 hours (8 yrs) 52 mg IUD 1 each, Daily RT    tamoxifen (NOLVADEX) 20 mg, oral, Daily, Take with water or any other nonalcoholic drink with or without food at around the same time(s) every day.      No family history on file.  Oncology History   Malignant neoplasm of breast   11/28/2022 Initial Diagnosis    Malignant neoplasm of breast (CMS/HCC)     4/23/2024 - 7/16/2024 Chemotherapy    TC (DOCEtaxel / Cyclophosphamide), 21 Day Cycles     Chest wall recurrence of breast cancer, left (Multi)   1/12/2024 Initial Diagnosis    Chest wall recurrence of breast cancer, left (CMS/HCC)     1/31/2024 Cancer Staged    Staging form: Breast, AJCC 8th Edition, Pathologic stage from 1/31/2024: Stage IB (rpT2, pN0(sn), cM0, G3, ER+, AL+, HER2-) - Signed by Julia Carrasquillo MD on 2/27/2024       Kirsty Shrestha  reports that she has never smoked. She has never been exposed to tobacco smoke. She has never used smokeless tobacco.  She  reports current alcohol use.  She  reports no history of drug use.    Physical Exam  Constitutional:       General: She is not in acute distress.     Appearance: Normal appearance. She is not ill-appearing.   Neurological:      General: No focal deficit present.      Mental Status: She is alert and oriented to person, place, and time.      Motor: No weakness.      Gait: Gait normal.   Psychiatric:         Mood and Affect: Mood normal.         Behavior: Behavior normal.         Thought Content: Thought content normal.         Judgment: Judgment normal.     No visits with results within 1 Week(s) from this visit.   Latest known visit with results is:   Office Visit on 12/20/2024   Component Date Value Ref Range Status    Case Report 12/20/2024    Final                    Value:Gynecologic Cytology                              Case: [id number]                                   Authorizing Provider:  Madai Betancur,      Collected:           12/20/2024 1540                                     NABILA                                                                     Ordering Location:     [facility]      Received:            12/20/2024 1547                                      Center                                                                       First Screen:          LONG Jung                                                           Rescreen:              LONG Eduardo                                                          Specimen:    ThinPrep Liquid-Based Pap-Imaging System Screen, CERVIX, SCREENING                         Final Cytological Interpretation 12/20/2024    Final                    Value:    A. THINPREP PAP CERVIX, SCREENING -     Specimen Adequacy  Satisfactory for evaluation; endocervical/transformation zone component is present    General Categorization  Negative for intraepithelial lesion or malignancy.    Descriptive Interpretation  Negative for intraepithelial lesion or malignancy  Fungal organisms morphologically consistent with Candida spp.              12/20/2024    Final                    Value:Slide(s) initially screened by LONG Jung at [facility] [address] [facility] [id number]  QC review performed by LONG Eduardo at [facility] [facility] [id number]  By the signature on this report, the individual or group listed as making the Final Interpretation/Diagnosis certifies that they have reviewed this case.       ThinPrep Imaging System 12/20/2024    Final                    Value:This specimen has been analyzed by the ThinPrep Imaging System ([employer] an automated imaging and review system, which assists the laboratory in evaluating cells on ThinPrep Pap tests. Following automated imaging, selected fields from every slide were reviewed by a cytotechnologist and/or pathologist.        Educational Note 12/20/2024    Final                    Value:Cervical cytology is a screening procedure primarily for squamous cancers and precursors and has associated false-negative and false-positives results as evidenced by published data. Your patient's test should be interpreted in this  context, together with the patient's history and clinical findings. Regular sampling and follow-up of unexplained clinical signs and symptoms are recommended to minimize false negative results.      Perform HPV HR test? 12/20/2024 Always (all interpretations)   Final    Include HPV Genotype? 12/20/2024 Yes   Final    History of malignancy 12/20/2024    Final                    Value:Breast Carcinoma  Comment: just ended chemo 2mo      History of treatment 12/20/2024    Final                    Value:Chemotherapy  Comment: breast cancer  Radiation Therapy      Edy Score 12/20/2024 0  0 - 3 Final    Interpretation of the Edy Score  0-3.....Normal vaginal microbiota  4-6.....Intermediate results  7-10....Bacterial vaginosis    Yeast 12/20/2024 PRESENT (A)  ABSENT Final    Clue Cells 12/20/2024 ABSENT  ABSENT Final    HPV, high-risk 12/20/2024 Negative  Negative Final    HPV Type 16 DNA 12/20/2024 Negative  Negative Final    HPV Type 18 DNA 12/20/2024 Negative  Negative Final    HPV non-Type 16 or 18 DNA 12/20/2024 Negative  Negative Final        Assessment/Plan    Kirsty Shrestha is a 34 y.o. F with prior Stage: IA (pT1b pN0) invasive ductal carcinoma of the left breast; ER >95%, WI >95%, Her2-negative, s/p bilateral mastectomies but lost to follow up on oral endocrine therapy. Represented in December with locally recurrent left invasive ductal carcinoma, stage IB (rPT2, pN0 M0) ER/WI+ HER2 equivocal. S/p L lumpectomy with wide excision 01/2024 followed by re-excision 02/2024 d/t positive margins. S/p chemotherapy with docetaxol and cyclophosphamide x 4 cycles, and chest irradiation, currently on oral endocrine therapy with tamoxifen and ovarian suppression therapy with leuprolide.     #Psychosocial: young adult and parent with cancer  - Connected to [facility] and engaged in programs  - Receiving young adult social meet up invitations via [employer]  - Will re-refer to MANUEL in breast program re help with  housing/rent    #Hot flashes: due to ovarian suppression/low circulating estrogen  - Continue supportive interventions, dressing in layers, fan at night, avoiding triggers  - Discussed options for medication management, provided patient with information about fezolinetant, gabapentin, and duloxetine - she is interested in medications, will review options and contact me with which one she would like to try    #Sexual Dysfunction/Contraception:  - Have previously discussed the seriousness of getting pregnant while receiving chemotherapy due to the harmful effects this could have on the fetus, and on her cancer treatment given the decreased availability of medical  services.   - Currently has mirena IUD in place -- safe form of contraception for breast cancer with hormone sensitivity  - Mild symptoms of dysparaneuria - instructed to use topical lubricant with sex - silicone or water based and monitor  - We discussed additional interventions including vaginal moisturizer and/or topical vaginal estrogen  - She was encouraged to reach out if she experiences any concerns regarding intimacy    #Diet/Exercise/Healthy Lifestyle:  - Currently engaging in exercise three times/week - encouraged continued engagement  - Research indicates consumption of a healthy, plant based diet not only decreases cancer risk, but also has been found to improve survival in cancer patients who partake in a healthy diet - encouraged small changes in diet such as incorporation of healthier snacks/fruits/vegetables to translate into longer term lifestyle changes  - Encouraged patient to continue to abstain from alcohol, tobacco, and recreational drugs    #Risk for infertility:  - Previously discussed the damaging effect cancer treatment can have on the ovaries.   - Previously discussed natural age related decline in fertility and menopause that occurs as a result of ovarian aging, and that cancer treatments can accellerate that progression  and diminish ovarian reserve.  - Individuals may experience varying degrees of short or long term ovarian dysfunction and amenorrhea, and that this is dependent upon type of cancer treatment, cumulative dose, and patients age.   - Loss of ovarian function may be permanent or temporary.  - Amenorrhea may be temporary or permanent -- temporary amenorrhea results from destruction of maturing follicles whereas permanent amenorrhea results from depletion of viable primordial follicles.  - Risk to fertility is INTERMEDIATE based on cancer treatment of Cyclophosphamide at doses ~5000mg  - Baseline AMH testing 03.08.24 1.25  - Underwent ovarian stimulation and oocyte retrieval 04/16 retrieved 8 mature oocytes -- at [facility] in long term storage  - Began endocrine therapy with tamoxifen 08/2024 - reviewed again POSITIVE trial and that at the earliest could consider family building in May 2026 - needs at least 18 months on therapy followed by 3 month washout    #Genetic Risk: young adult with cancer and personal family history of cancer  - Has been seen by genetic risk and no mutations identified    Follow up in 3 months     The amount of time that I spent with the patient:  Prep: 5  Time spend directly with patient: 30  Coordinating care:   Documentation: 5  Other time:    Total time spent on encounter: 40             PUSHPA Hannon-CNP

## 2025-01-10 NOTE — PROGRESS NOTES
Referral per Medical team member, JAELYN Zhao, APRN-CNP. Patient having financial difficulties. Sw spoke with patient to assess her current situation. She is not currently working and hasn't been for some time. She has been employed as a building . Currently patient's family has been helping her out financially. SW will gather community resources for patient.    Renee Felder, VIOLETAS

## 2025-01-13 ENCOUNTER — HOSPITAL ENCOUNTER (OUTPATIENT)
Dept: RADIOLOGY | Facility: HOSPITAL | Age: 35
Discharge: HOME | End: 2025-01-13
Payer: COMMERCIAL

## 2025-01-13 DIAGNOSIS — R76.11 POSITIVE TB TEST: ICD-10-CM

## 2025-01-13 PROCEDURE — 71046 X-RAY EXAM CHEST 2 VIEWS: CPT

## 2025-01-13 PROCEDURE — 71046 X-RAY EXAM CHEST 2 VIEWS: CPT | Performed by: RADIOLOGY

## 2025-01-14 NOTE — RESULT ENCOUNTER NOTE
Lab results reviewed, results communicated to patient via "[employer] message.

## 2025-01-21 ENCOUNTER — PATIENT MESSAGE (OUTPATIENT)
Dept: HEMATOLOGY/ONCOLOGY | Facility: HOSPITAL | Age: 35
End: 2025-01-21
Payer: COMMERCIAL

## 2025-01-21 DIAGNOSIS — T45.1X5A HOT FLASHES RELATED TO AROMATASE INHIBITOR THERAPY: ICD-10-CM

## 2025-01-21 DIAGNOSIS — R23.2 HOT FLASHES RELATED TO AROMATASE INHIBITOR THERAPY: ICD-10-CM

## 2025-01-21 DIAGNOSIS — C50.819 MALIGNANT NEOPLASM OF OVERLAPPING SITES OF BREAST IN FEMALE, ESTROGEN RECEPTOR POSITIVE, UNSPECIFIED LATERALITY: Primary | ICD-10-CM

## 2025-01-21 DIAGNOSIS — Z17.0 MALIGNANT NEOPLASM OF OVERLAPPING SITES OF BREAST IN FEMALE, ESTROGEN RECEPTOR POSITIVE, UNSPECIFIED LATERALITY: Primary | ICD-10-CM

## 2025-01-23 ENCOUNTER — NURSE TRIAGE (OUTPATIENT)
Dept: HEMATOLOGY/ONCOLOGY | Facility: HOSPITAL | Age: 35
End: 2025-01-23
Payer: COMMERCIAL

## 2025-01-23 ENCOUNTER — TELEPHONE (OUTPATIENT)
Dept: HEMATOLOGY/ONCOLOGY | Facility: HOSPITAL | Age: 35
End: 2025-01-23
Payer: COMMERCIAL

## 2025-01-23 DIAGNOSIS — C50.819 MALIGNANT NEOPLASM OF OVERLAPPING SITES OF BREAST IN FEMALE, ESTROGEN RECEPTOR POSITIVE, UNSPECIFIED LATERALITY: Primary | ICD-10-CM

## 2025-01-23 DIAGNOSIS — Z17.0 MALIGNANT NEOPLASM OF OVERLAPPING SITES OF BREAST IN FEMALE, ESTROGEN RECEPTOR POSITIVE, UNSPECIFIED LATERALITY: Primary | ICD-10-CM

## 2025-01-23 NOTE — TELEPHONE ENCOUNTER
RN spoke to patient informed her that Dr. Carrasquillo has ordered US layla and fuv, scheduling to call and schedule. Patient in agreement

## 2025-01-27 ENCOUNTER — INFUSION (OUTPATIENT)
Dept: HEMATOLOGY/ONCOLOGY | Facility: HOSPITAL | Age: 35
End: 2025-01-27
Payer: COMMERCIAL

## 2025-01-27 VITALS
OXYGEN SATURATION: 100 % | TEMPERATURE: 98.1 F | WEIGHT: 211.86 LBS | BODY MASS INDEX: 38.75 KG/M2 | SYSTOLIC BLOOD PRESSURE: 147 MMHG | HEART RATE: 66 BPM | RESPIRATION RATE: 18 BRPM | DIASTOLIC BLOOD PRESSURE: 87 MMHG

## 2025-01-27 DIAGNOSIS — Z85.3 HISTORY OF MALIGNANT NEOPLASM OF BREAST: ICD-10-CM

## 2025-01-27 DIAGNOSIS — Z17.0 MALIGNANT NEOPLASM OF OVERLAPPING SITES OF LEFT BREAST IN FEMALE, ESTROGEN RECEPTOR POSITIVE: ICD-10-CM

## 2025-01-27 DIAGNOSIS — C50.812 MALIGNANT NEOPLASM OF OVERLAPPING SITES OF LEFT BREAST IN FEMALE, ESTROGEN RECEPTOR POSITIVE: ICD-10-CM

## 2025-01-27 PROCEDURE — 96401 CHEMO ANTI-NEOPL SQ/IM: CPT

## 2025-01-27 PROCEDURE — 2500000004 HC RX 250 GENERAL PHARMACY W/ HCPCS (ALT 636 FOR OP/ED): Mod: JZ,SE,TB | Performed by: INTERNAL MEDICINE

## 2025-01-27 RX ORDER — ALBUTEROL SULFATE 0.83 MG/ML
3 SOLUTION RESPIRATORY (INHALATION) AS NEEDED
OUTPATIENT
Start: 2025-03-03

## 2025-01-27 RX ORDER — FAMOTIDINE 10 MG/ML
20 INJECTION INTRAVENOUS ONCE AS NEEDED
OUTPATIENT
Start: 2025-03-03

## 2025-01-27 RX ORDER — DIPHENHYDRAMINE HYDROCHLORIDE 50 MG/ML
50 INJECTION INTRAMUSCULAR; INTRAVENOUS AS NEEDED
OUTPATIENT
Start: 2025-03-03

## 2025-01-27 RX ORDER — EPINEPHRINE 0.3 MG/.3ML
0.3 INJECTION SUBCUTANEOUS EVERY 5 MIN PRN
OUTPATIENT
Start: 2025-03-03

## 2025-01-27 RX ADMIN — LEUPROLIDE ACETATE 11.25 MG: KIT at 15:13

## 2025-01-27 ASSESSMENT — PAIN SCALES - GENERAL: PAINLEVEL_OUTOF10: 0-NO PAIN

## 2025-01-27 NOTE — PROGRESS NOTES
Kirsty Shrestha is a 34 y.o. female who presents in stable condition for leuprolide injection    Since her last visit, she has been doing well.  Overall, she states her energy level is stable.  Her appetite & hydration status has been good.  She reports no complaints.  She has no other concerns.    Patient tolerated right ventrogluteal subcutaneous injection well and has been educated with the overall therapy plan. She has no other questions or concerns at this time. AVS & future appointment provided. Patient discharged in stable condition.    Next injection due 4/23/2025    Reviewed and approved by ILDEFONSO SHEEHAN on 1/27/25 at 3:21 PM.

## 2025-01-28 ENCOUNTER — APPOINTMENT (OUTPATIENT)
Dept: HEMATOLOGY/ONCOLOGY | Facility: HOSPITAL | Age: 35
End: 2025-01-28
Payer: COMMERCIAL

## 2025-01-30 NOTE — PATIENT COMMUNICATION
Contacted patient in follow up to her [employer] message. She expresses nervousness over starting a new medication for hot flashes. Previously prescribed veozah was denied until she tries other medications for hot flashes. We discussed other alternatives duloxetine and gabapentin, specifically discussed side effects of each, dosing schedule and how we would monitor for side effects and effectiveness before stopping. She expressed wanting to take some time to think about whether or not she would like to start a new medication and we agreed to shared medical decision making. She will notify me directly if she would like to start medication management of hot flashes.

## 2025-02-04 ENCOUNTER — APPOINTMENT (OUTPATIENT)
Dept: RADIOLOGY | Facility: HOSPITAL | Age: 35
End: 2025-02-04
Payer: COMMERCIAL

## 2025-02-04 ENCOUNTER — HOSPITAL ENCOUNTER (OUTPATIENT)
Dept: RADIOLOGY | Facility: CLINIC | Age: 35
Discharge: HOME | End: 2025-02-04
Payer: COMMERCIAL

## 2025-02-04 ENCOUNTER — APPOINTMENT (OUTPATIENT)
Dept: HEMATOLOGY/ONCOLOGY | Facility: HOSPITAL | Age: 35
End: 2025-02-04
Payer: COMMERCIAL

## 2025-02-04 DIAGNOSIS — Z17.0 MALIGNANT NEOPLASM OF OVERLAPPING SITES OF BREAST IN FEMALE, ESTROGEN RECEPTOR POSITIVE, UNSPECIFIED LATERALITY: ICD-10-CM

## 2025-02-04 DIAGNOSIS — C50.819 MALIGNANT NEOPLASM OF OVERLAPPING SITES OF BREAST IN FEMALE, ESTROGEN RECEPTOR POSITIVE, UNSPECIFIED LATERALITY: ICD-10-CM

## 2025-02-04 PROCEDURE — 76981 USE PARENCHYMA: CPT | Mod: LT

## 2025-02-04 PROCEDURE — 76642 ULTRASOUND BREAST LIMITED: CPT | Mod: LT,59

## 2025-02-10 ENCOUNTER — APPOINTMENT (OUTPATIENT)
Dept: RADIOLOGY | Facility: HOSPITAL | Age: 35
End: 2025-02-10
Payer: COMMERCIAL

## 2025-02-11 ENCOUNTER — APPOINTMENT (OUTPATIENT)
Dept: HEMATOLOGY/ONCOLOGY | Facility: HOSPITAL | Age: 35
End: 2025-02-11
Payer: COMMERCIAL

## 2025-02-18 ENCOUNTER — OFFICE VISIT (OUTPATIENT)
Dept: HEMATOLOGY/ONCOLOGY | Facility: HOSPITAL | Age: 35
End: 2025-02-18
Payer: COMMERCIAL

## 2025-02-18 ENCOUNTER — LAB (OUTPATIENT)
Dept: LAB | Facility: HOSPITAL | Age: 35
End: 2025-02-18
Payer: COMMERCIAL

## 2025-02-18 VITALS
HEART RATE: 87 BPM | TEMPERATURE: 97.5 F | WEIGHT: 206.57 LBS | DIASTOLIC BLOOD PRESSURE: 76 MMHG | RESPIRATION RATE: 17 BRPM | HEIGHT: 62 IN | SYSTOLIC BLOOD PRESSURE: 124 MMHG | BODY MASS INDEX: 38.01 KG/M2 | OXYGEN SATURATION: 96 %

## 2025-02-18 DIAGNOSIS — C50.812 MALIGNANT NEOPLASM OF OVERLAPPING SITES OF LEFT FEMALE BREAST: Primary | ICD-10-CM

## 2025-02-18 DIAGNOSIS — C50.819 MALIGNANT NEOPLASM OF OVERLAPPING SITES OF BREAST IN FEMALE, ESTROGEN RECEPTOR POSITIVE, UNSPECIFIED LATERALITY: ICD-10-CM

## 2025-02-18 DIAGNOSIS — Z17.0 MALIGNANT NEOPLASM OF OVERLAPPING SITES OF BREAST IN FEMALE, ESTROGEN RECEPTOR POSITIVE, UNSPECIFIED LATERALITY: ICD-10-CM

## 2025-02-18 DIAGNOSIS — C50.812 MALIGNANT NEOPLASM OF OVERLAPPING SITES OF LEFT BREAST IN FEMALE, ESTROGEN RECEPTOR POSITIVE: ICD-10-CM

## 2025-02-18 DIAGNOSIS — Z17.0 MALIGNANT NEOPLASM OF OVERLAPPING SITES OF LEFT BREAST IN FEMALE, ESTROGEN RECEPTOR POSITIVE: ICD-10-CM

## 2025-02-18 LAB — ESTRADIOL SERPL-MCNC: <19 PG/ML

## 2025-02-18 PROCEDURE — 3008F BODY MASS INDEX DOCD: CPT | Performed by: INTERNAL MEDICINE

## 2025-02-18 PROCEDURE — 99214 OFFICE O/P EST MOD 30 MIN: CPT | Performed by: INTERNAL MEDICINE

## 2025-02-18 PROCEDURE — 82670 ASSAY OF TOTAL ESTRADIOL: CPT

## 2025-02-18 ASSESSMENT — PAIN SCALES - GENERAL: PAINLEVEL_OUTOF10: 0-NO PAIN
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
## 2025-03-11 ENCOUNTER — APPOINTMENT (OUTPATIENT)
Dept: HEMATOLOGY/ONCOLOGY | Facility: HOSPITAL | Age: 35
End: 2025-03-11
Payer: COMMERCIAL

## 2025-03-27 ENCOUNTER — TELEPHONE (OUTPATIENT)
Dept: ADMISSION | Facility: HOSPITAL | Age: 35
End: 2025-03-27
Payer: COMMERCIAL

## 2025-03-27 NOTE — TELEPHONE ENCOUNTER
Patient set up to see Tasneem PINEDA NP tomorrow morning and get US. Patient appreciative and aware of appt.

## 2025-03-27 NOTE — TELEPHONE ENCOUNTER
Patient reports anew lump  on top upper part of left breast. She denies any pain at lump, no swelling to breast or discoloration, no swelling to left axilla or arm. She noticed it earlier this week. She just had a US done on 2/4/25, calling to report new lump and find out if she needs another US  Forwarding to provider

## 2025-03-28 ENCOUNTER — OFFICE VISIT (OUTPATIENT)
Dept: SURGICAL ONCOLOGY | Facility: HOSPITAL | Age: 35
End: 2025-03-28
Payer: COMMERCIAL

## 2025-03-28 ENCOUNTER — HOSPITAL ENCOUNTER (OUTPATIENT)
Dept: RADIOLOGY | Facility: HOSPITAL | Age: 35
Discharge: HOME | End: 2025-03-28
Payer: COMMERCIAL

## 2025-03-28 VITALS
BODY MASS INDEX: 37.78 KG/M2 | OXYGEN SATURATION: 97 % | RESPIRATION RATE: 20 BRPM | HEART RATE: 88 BPM | HEIGHT: 62 IN | DIASTOLIC BLOOD PRESSURE: 82 MMHG | SYSTOLIC BLOOD PRESSURE: 125 MMHG | WEIGHT: 205.3 LBS | TEMPERATURE: 95.5 F

## 2025-03-28 DIAGNOSIS — C50.912 CHEST WALL RECURRENCE OF BREAST CANCER, LEFT: ICD-10-CM

## 2025-03-28 DIAGNOSIS — C50.912 MALIGNANT NEOPLASM OF LEFT BREAST IN FEMALE, ESTROGEN RECEPTOR POSITIVE, UNSPECIFIED SITE OF BREAST: ICD-10-CM

## 2025-03-28 DIAGNOSIS — N63.0 BREAST LUMP: ICD-10-CM

## 2025-03-28 DIAGNOSIS — R92.8 ABNORMAL FINDING ON BREAST IMAGING: Primary | ICD-10-CM

## 2025-03-28 DIAGNOSIS — Z17.0 MALIGNANT NEOPLASM OF LEFT BREAST IN FEMALE, ESTROGEN RECEPTOR POSITIVE, UNSPECIFIED SITE OF BREAST: ICD-10-CM

## 2025-03-28 DIAGNOSIS — C79.89 CHEST WALL RECURRENCE OF BREAST CANCER, LEFT: ICD-10-CM

## 2025-03-28 PROCEDURE — 99214 OFFICE O/P EST MOD 30 MIN: CPT

## 2025-03-28 PROCEDURE — 76982 USE 1ST TARGET LESION: CPT | Mod: LT

## 2025-03-28 PROCEDURE — 76642 ULTRASOUND BREAST LIMITED: CPT | Mod: LT

## 2025-03-28 PROCEDURE — 3008F BODY MASS INDEX DOCD: CPT

## 2025-03-28 RX ORDER — TAMOXIFEN CITRATE 20 MG/1
20 TABLET ORAL DAILY
COMMUNITY
Start: 2025-03-06

## 2025-03-28 ASSESSMENT — PAIN SCALES - GENERAL: PAINLEVEL_OUTOF10: 3

## 2025-03-28 NOTE — PATIENT INSTRUCTIONS
Please return in 3 months with left limited ultrasound or sooner if you have any problems or concerns. Your breast imaging was a BI-RADS category 3. This means the findings on imaging are probably benign, but still require short term follow up to monitor stability. The chances of the findings in this category being a cancer are extremely low, less than 1-2%. You will likely need repeat follow up imaging in 6-12 month intervals for up to 2 years until the findings are known to be stable or resolve. This eliminates unnecessary biopsies and allows for early diagnosis should the area change over time.      You can see your health information, review clinical summaries from office visits & test results online when you follow your health with MY  Chart, a personal health record. To sign up go to [email or web address]. If you need assistance with signing up or trouble getting into your account call [facility] Patient Line 24/7 at [phone].    My office phone number is [phone] if you need to get in touch with me or have additional questions or concerns. Thank you for choosing [facility] and trusting me as your healthcare provider. I look forward to seeing you again at your next office visit. I am honored to be a provider on your health care team and I remain dedicated to helping you achieve your health goals.
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
## 2025-04-08 ENCOUNTER — TELEPHONE (OUTPATIENT)
Dept: RADIATION ONCOLOGY | Facility: HOSPITAL | Age: 35
End: 2025-04-08
Payer: COMMERCIAL

## 2025-04-09 ENCOUNTER — HOSPITAL ENCOUNTER (OUTPATIENT)
Dept: RADIATION ONCOLOGY | Facility: HOSPITAL | Age: 35
Setting detail: RADIATION/ONCOLOGY SERIES
Discharge: HOME | End: 2025-04-09
Payer: COMMERCIAL

## 2025-04-09 VITALS
OXYGEN SATURATION: 97 % | SYSTOLIC BLOOD PRESSURE: 144 MMHG | HEART RATE: 86 BPM | DIASTOLIC BLOOD PRESSURE: 85 MMHG | BODY MASS INDEX: 38.14 KG/M2 | RESPIRATION RATE: 18 BRPM | WEIGHT: 208.56 LBS | TEMPERATURE: 96.8 F

## 2025-04-09 DIAGNOSIS — C50.812 MALIGNANT NEOPLASM OF OVERLAPPING SITES OF LEFT BREAST IN FEMALE, ESTROGEN RECEPTOR POSITIVE: Primary | ICD-10-CM

## 2025-04-09 DIAGNOSIS — Z17.0 MALIGNANT NEOPLASM OF OVERLAPPING SITES OF LEFT BREAST IN FEMALE, ESTROGEN RECEPTOR POSITIVE: Primary | ICD-10-CM

## 2025-04-09 PROCEDURE — 99213 OFFICE O/P EST LOW 20 MIN: CPT | Performed by: NURSE PRACTITIONER

## 2025-04-09 ASSESSMENT — PATIENT HEALTH QUESTIONNAIRE - PHQ9
SUM OF ALL RESPONSES TO PHQ9 QUESTIONS 1 AND 2: 0
1. LITTLE INTEREST OR PLEASURE IN DOING THINGS: NOT AT ALL
2. FEELING DOWN, DEPRESSED OR HOPELESS: NOT AT ALL

## 2025-04-09 ASSESSMENT — ENCOUNTER SYMPTOMS
FEVER: 0
LOSS OF SENSATION IN FEET: 0
HEMATOLOGIC/LYMPHATIC NEGATIVE: 1
MUSCULOSKELETAL NEGATIVE: 1
NEUROLOGICAL NEGATIVE: 1
DEPRESSION: 0
RESPIRATORY NEGATIVE: 1
DIAPHORESIS: 0
UNEXPECTED WEIGHT CHANGE: 0
FATIGUE: 0
APPETITE CHANGE: 0
OCCASIONAL FEELINGS OF UNSTEADINESS: 0
CARDIOVASCULAR NEGATIVE: 1
PSYCHIATRIC NEGATIVE: 1
CHILLS: 0
HOT FLASHES: 1

## 2025-04-09 ASSESSMENT — PAIN SCALES - GENERAL: PAINLEVEL_OUTOF10: 0-NO PAIN

## 2025-04-09 NOTE — PROGRESS NOTES
Radiation Oncology Follow-Up    Patient Name:  Kirsty Shrestha  MRN:  [id number]  :  1990    Primary Care Provider: Shruthi Bullard MD  Care Team: Patient Care Team:  Shruthi Bullard MD as PCP - General (Family Medicine)  Roslyn Wheatley PA-C as PCP - [facility] PCP  Julia Carrasquillo MD as Consulting Physician (Hematology and Oncology)  Julia Nguyen RN as Registered Nurse (Reproductive Endocrinology and Infertility)    Date of Service: 2025     Cancer History:          Breast         AJCC Edition: 8th (AJCC), Diagnosis Date: 2024         Cancer Staging         Chest wall recurrence of breast cancer, left (CMS/HCC)      Staging form: Breast, AJCC 8th Edition  - Pathologic stage from 2024: Stage IB (rpT2, pN0(sn), cM0, G3, ER+, TX+, HER2-) - Signed by Julia Carrasquillo MD on 2024  Stage prefix: Recurrence  Method of lymph node assessment: [place] lymph node biopsy  Nuclear grade: G3  Multigene prognostic tests performed: None  Histologic grading system: 3 grade system  Menopausal status: Premenopausal     35 y.o. premenopausal female with prior Stage: IA (pT1b pN0) invasive ductal carcinoma of the left breast; ER >95%, TX >95%, Her2-negative, Mammaprint: High-risk, luminal B, -0.615. Now with locally recurrent left invasive ductal carcinoma, stage IB (rPT2, pN0 M0).  The patient's recurrent breast cancer was diagnosed on 2023, and is grade 3, Estrogen Receptor positive at 95%, Progesterone receptor at >95% and her2 equivocal and not amplified at 1.1.      ONCOLOGIC HISTORY:     22: Dx MG/US performed. A 1.3 x 0.6 x 0.7 cm hypoechoic mass seen in left breast (11:00, 13 cm FN). An additional 0.9 cm mass at 2:00 (8 cm FN) was also seen, possibly benign. ALN’s appeared unremarkable.  22: Left breast mass biopsy (11:00) showed IDC, grade 3. ER >95%, TX >95%, Her2-negative (2+ IHC; AMHNAZ ratio 1.9, copy #3.6). Mammaprint high-risk (-0.615), luminal B type. Maximum diameter 0.7  "cm.  4/28/22: Biopsy of 2:00 lesion benign. Left ALN negative.  Genetic Testing completed. Negative  5/23/2023 bilateral stage 2 breast recosntruction with removal of TE and placement of new IDEA: saline implants 610cc with autologous fat grafting of 160cc per breast and abdominoplasty with rectus plication and umbilical transposition   7/14/22: Bilateral mastectomy with implant reconstruction and left SLNBx performed. No residual carcinoma seen in left breast. Right breast benign. 0/1 left SLN’s involved. pT1bN0 (IA).  08/18/2022: Started on Tamoxifen 20 mg daily by Dr. Connell. Patient did not follow-up with medical oncology after that and stopped Tamoxifen after her prescription run out  12/22/2023: Patient underwent an US-guided left breast biopsy of a cystic mass at 12:00. Pathology was consistent with Invasive ductal carcinoma, grade 3; ER greater than 95%, OH greater than 95%, HER2 negative   1/31/2024: Patient underwent left breast lumpectomy/wide excision of breast cancer and SLNB. Pathology revealed multifocal disease with 4 separate nodules measuring 32, 11, 7 and 4 mm respectively with 0/1 SLNs involved. With invasive cancer present at lateral margin, within microns of posterior margin and <1mm of anterior and medial margins. Repeat receptor of mass at 12:00 showed Estrogen Receptor positive at 95%, Progesteron receptor at >95% and her2 equivocal and not amplified at 1.1    02/26/2024: Patient underwent re-excision  04/16/2024: Patient underwent egg retrieval. Total eggs retrieved = 8  04/23/2024: Cycle #1 of TC attempted and discontinued due to hypersensitivity reaction  07/16/2024: Received the last cycle of TC  Radiation therapy to left breast as follows\"    IMRT: Left Breast with lymph nodes    Treatment Period Technique Fraction Dose Fractions Total Dose   Course 1 7/29/2024-8/20/2024  (days elapsed: 22)         [id number] 7/29/2024-8/20/2024 VMAT 330 / 330 cGy 16 / 16 5,280 / 5,280 cGy     15. " Started Tamoxifen 8/2024  16. Started Lupron 11/5/2024    SUBJECTIVE    History of Present Illness:   Kirsty Shrestha is here today for routine radiation follow up/ radiation survivorship visit.  Doing well post radiation and left breast healing well with skin intact. She was seen by breast surgery a few weeks ago for a small nodule she palpated on left chest wall. Ultrasound done and felt to be benign. Plan is to re image In 3 months. Patient can still feel when sitting up.  No swelling of left arm or difficulty with ROM. Still feels tight when lifting arms. Doing stretches at home. Taking tamoxifen and Lupron. Only side effect is hot flashes. She states manageable now but nervous how she will feel in the summer.  She has a Bertha IUD and no menses. Denies headaches, fever, chills, cough, SOB, chest pain, n/v/c/d or bony pain.     Review of Systems:    Review of Systems   Constitutional:  Negative for appetite change, chills, diaphoresis, fatigue, fever and unexpected weight change.   Respiratory: Negative.     Cardiovascular: Negative.    Endocrine: Positive for hot flashes.   Musculoskeletal: Negative.    Skin: Negative.    Neurological: Negative.    Hematological: Negative.    Psychiatric/Behavioral: Negative.       Performance Status:   The Karnofsky performance scale today is 100, Fully active, able to carry on all pre-disease performed without restriction (ECOG equivalent 0).      Vital Signs: /88   Pulse 82   Temp 36.2 °C (97.2 °F) (Temporal)   Resp 18   Wt 99.1 kg (218 lb 8 oz)   SpO2 97%   BMI 39.95 kg/m²     OBJECTIVE    Current Outpatient Medications:     fexofenadine-pseudoephedrine (Allegra-D 24) 180-240 mg 24 hr tablet, Take 1 tablet by mouth once daily. Do not crush, chew, or split., Disp: , Rfl:     levonorgestrel (Mirena) 21 mcg/24 hours (8 yrs) 52 mg IUD, 52 mg by intrauterine route once daily., Disp: , Rfl:     tamoxifen (Nolvadex) 20 mg tablet, Take 1 tablet (20 mg total) by mouth  once daily., Disp: , Rfl:      Physical Exam  Vitals reviewed.   Constitutional:       Appearance: Normal appearance.   HENT:      Head: Normocephalic and atraumatic.      Nose: Nose normal.      Mouth/Throat:      Mouth: Mucous membranes are dry.   Eyes:      Extraocular Movements: Extraocular movements intact.      Pupils: Pupils are equal, round, and reactive to light.   Cardiovascular:      Rate and Rhythm: Normal rate and regular rhythm.      Heart sounds: Normal heart sounds.   Pulmonary:      Effort: Pulmonary effort is normal.      Breath sounds: Normal breath sounds.   Chest:          Comments: Bilateral implant reconstruction. Well healed incisions. No new nodules palpated.   Abdominal:      Palpations: Abdomen is soft.   Musculoskeletal:         General: No swelling. Normal range of motion.      Cervical back: Normal range of motion and neck supple.   Lymphadenopathy:      Cervical: No cervical adenopathy.      Upper Body:      Right upper body: No supraclavicular or axillary adenopathy.      Left upper body: No supraclavicular or axillary adenopathy.   Skin:     General: Skin is warm and dry.   Neurological:      General: No focal deficit present.      Mental Status: She is alert and oriented to person, place, and time.   Psychiatric:         Mood and Affect: Mood normal.         Behavior: Behavior normal.        ASSESSMENT:    35 y.o. female with stage IB (rpT2, pN0(sn), cM0, G3, ER+, IA+, HER2-) of the left breast s/p  eft breast lumpectomy/wide excision of breast cancer and SLNB followed by chemotherapy and radiation.  Doing well. WEI on exam. Unable to palpate small nodule on left chest wall patient feels. [place] to be benign on recent US. Plan for 3 months follow up with breast surgery.   PLAN:    - Continue tamoxifen and Lupron. Continue follow up with breast team as scheduled.   - Radiation follow up in 12 mo.  Call with any questions or concerns.

## 2025-04-23 ENCOUNTER — APPOINTMENT (OUTPATIENT)
Dept: HEMATOLOGY/ONCOLOGY | Facility: HOSPITAL | Age: 35
End: 2025-04-23
Payer: COMMERCIAL

## 2025-04-23 ENCOUNTER — INFUSION (OUTPATIENT)
Dept: HEMATOLOGY/ONCOLOGY | Facility: HOSPITAL | Age: 35
End: 2025-04-23
Payer: COMMERCIAL

## 2025-04-23 VITALS
OXYGEN SATURATION: 98 % | SYSTOLIC BLOOD PRESSURE: 141 MMHG | RESPIRATION RATE: 18 BRPM | WEIGHT: 210.1 LBS | HEART RATE: 98 BPM | HEIGHT: 62 IN | BODY MASS INDEX: 38.66 KG/M2 | TEMPERATURE: 97.2 F | DIASTOLIC BLOOD PRESSURE: 82 MMHG

## 2025-04-23 DIAGNOSIS — Z17.0 MALIGNANT NEOPLASM OF OVERLAPPING SITES OF LEFT BREAST IN FEMALE, ESTROGEN RECEPTOR POSITIVE: ICD-10-CM

## 2025-04-23 DIAGNOSIS — C50.812 MALIGNANT NEOPLASM OF OVERLAPPING SITES OF LEFT BREAST IN FEMALE, ESTROGEN RECEPTOR POSITIVE: ICD-10-CM

## 2025-04-23 PROCEDURE — 96401 CHEMO ANTI-NEOPL SQ/IM: CPT

## 2025-04-23 PROCEDURE — 2500000004 HC RX 250 GENERAL PHARMACY W/ HCPCS (ALT 636 FOR OP/ED): Mod: JZ,SE | Performed by: INTERNAL MEDICINE

## 2025-04-23 RX ORDER — FAMOTIDINE 10 MG/ML
20 INJECTION, SOLUTION INTRAVENOUS ONCE AS NEEDED
OUTPATIENT
Start: 2025-05-27

## 2025-04-23 RX ORDER — ALBUTEROL SULFATE 0.83 MG/ML
3 SOLUTION RESPIRATORY (INHALATION) AS NEEDED
OUTPATIENT
Start: 2025-05-27

## 2025-04-23 RX ORDER — DIPHENHYDRAMINE HYDROCHLORIDE 50 MG/ML
50 INJECTION, SOLUTION INTRAMUSCULAR; INTRAVENOUS AS NEEDED
OUTPATIENT
Start: 2025-05-27

## 2025-04-23 RX ORDER — EPINEPHRINE 0.3 MG/.3ML
0.3 INJECTION SUBCUTANEOUS EVERY 5 MIN PRN
OUTPATIENT
Start: 2025-05-27

## 2025-04-23 RX ADMIN — LEUPROLIDE ACETATE 11.25 MG: KIT at 11:23

## 2025-04-23 NOTE — PROGRESS NOTES
Patient arrived ambulatory to infusion for scheduled tx of leuprolide.  Denies any new or worsening sx. Tolerated injection without issue. Patient sent to  to schedule future appointments. Discharged in stable condition.

## 2025-04-29 ENCOUNTER — APPOINTMENT (OUTPATIENT)
Dept: PRIMARY CARE | Facility: CLINIC | Age: 35
End: 2025-04-29
Payer: COMMERCIAL

## 2025-04-29 VITALS
HEART RATE: 76 BPM | OXYGEN SATURATION: 92 % | WEIGHT: 210.2 LBS | DIASTOLIC BLOOD PRESSURE: 83 MMHG | SYSTOLIC BLOOD PRESSURE: 138 MMHG | TEMPERATURE: 98.1 F | BODY MASS INDEX: 38.44 KG/M2

## 2025-04-29 DIAGNOSIS — L03.818 CELLULITIS OF OTHER SPECIFIED SITE: Primary | ICD-10-CM

## 2025-04-29 PROCEDURE — 1036F TOBACCO NON-USER: CPT | Performed by: STUDENT IN AN ORGANIZED HEALTH CARE EDUCATION/TRAINING PROGRAM

## 2025-04-29 PROCEDURE — 99214 OFFICE O/P EST MOD 30 MIN: CPT | Performed by: STUDENT IN AN ORGANIZED HEALTH CARE EDUCATION/TRAINING PROGRAM

## 2025-04-29 RX ORDER — CEPHALEXIN 500 MG/1
500 CAPSULE ORAL 2 TIMES DAILY
Qty: 14 CAPSULE | Refills: 0 | Status: SHIPPED | OUTPATIENT
Start: 2025-04-29 | End: 2025-05-06

## 2025-04-29 ASSESSMENT — ENCOUNTER SYMPTOMS
NAUSEA: 0
OCCASIONAL FEELINGS OF UNSTEADINESS: 0
DIZZINESS: 0
ABDOMINAL PAIN: 0
SHORTNESS OF BREATH: 0
COLOR CHANGE: 0
MUSCLE WEAKNESS: 0
DIFFICULTY URINATING: 0
ADENOPATHY: 0
CHILLS: 0
CONSTIPATION: 0
DIARRHEA: 0
TINGLING: 0
FEVER: 0
HEADACHES: 0
FATIGUE: 0
WHEEZING: 0
NUMBNESS: 0
COUGH: 0
DEPRESSION: 0
VOMITING: 0
LOSS OF SENSATION IN FEET: 0

## 2025-04-29 ASSESSMENT — PATIENT HEALTH QUESTIONNAIRE - PHQ9
SUM OF ALL RESPONSES TO PHQ9 QUESTIONS 1 AND 2: 0
2. FEELING DOWN, DEPRESSED OR HOPELESS: NOT AT ALL
1. LITTLE INTEREST OR PLEASURE IN DOING THINGS: NOT AT ALL

## 2025-04-29 NOTE — PROGRESS NOTES
[facility] - Primary Care  [address], [address]  [place] [address]    Subjective     Patient ID: Kirsty Shrestha is a 35 y.o. female who presents for  Arm Pain (Left upper arm pain)     Arm Pain   The incident occurred 5 to 7 days ago (1 week duration). The incident occurred at home. There was no injury mechanism. The pain is present in the left forearm and upper left arm. The quality of the pain is described as shooting. The pain radiates to the left arm. The pain is moderate. The pain has been Intermittent since the incident. Pertinent negatives include no chest pain, muscle weakness, numbness or tingling. The symptoms are aggravated by movement. She has tried nothing for the symptoms.       Review of Systems   Constitutional:  Negative for chills, fatigue and fever.   HENT:  Negative for congestion.    Eyes:  Negative for visual disturbance.   Respiratory:  Negative for cough, shortness of breath and wheezing.    Cardiovascular:  Negative for chest pain.   Gastrointestinal:  Negative for abdominal pain, constipation, diarrhea, nausea and vomiting.   Genitourinary:  Negative for difficulty urinating.   Musculoskeletal:  Negative for gait problem.   Skin:  Negative for color change.   Neurological:  Negative for dizziness, tingling, syncope, numbness and headaches.   Hematological:  Negative for adenopathy.       Social History[1]  Family History[2]  RX Allergies[3]   Current Medications[4]    /83 (BP Location: Right arm, Patient Position: Sitting, BP Cuff Size: Large adult)   Pulse 76   Temp 36.7 °C (98.1 °F) (Oral)   Wt 95.3 kg (210 lb 3.2 oz)   SpO2 92%   BMI 38.44 kg/m²      Objective   Physical Exam  Cardiovascular:      Rate and Rhythm: Normal rate.      Pulses: Normal pulses.   Pulmonary:      Effort: Pulmonary effort is normal.   Skin:     General: Skin is warm and dry.      Comments: Area of mild redness in the inner arm on the left, around the cubital fossa towards the  "upper arm. No vesicles, nodules, no palpable lymph nodes. No warmth. No drainage or bleeding. No entry wounds or foreign bodies palpated.           Labs:   Lab Results   Component Value Date    WBC 7.0 11/09/2024    HGB 12.2 11/09/2024    HCT 35.9 (L) 11/09/2024     11/09/2024    TSH 0.26 (L) 07/15/2024    INR 1.0 05/19/2023     Lab Results   Component Value Date     11/09/2024    K 3.5 11/09/2024     (H) 11/09/2024    BUN 11 11/09/2024    CREATININE 0.85 11/09/2024    GLUCOSE 105 (H) 11/09/2024    CALCIUM 8.6 11/09/2024    PROT 7.2 11/09/2024    BILITOT 0.3 11/09/2024    ALKPHOS 76 11/09/2024    AST 20 11/09/2024    ALT 18 11/09/2024     Lab Results   Component Value Date    CHOL 198 07/15/2024    CHOL 175 10/17/2023      Lab Results   Component Value Date    TRIG 103 10/17/2023      Lab Results   Component Value Date    HDL 44.7 07/15/2024    HDL 54.0 10/17/2023     Lab Results   Component Value Date    LDLCALC 100 (H) 10/17/2023      Lab Results   Component Value Date    VLDL 21 10/17/2023    No components found for: \"CHOLHDLRATI0\"    No data recorded    Imaging/Testing: BI US breast limited left  Narrative: Interpreted By:  Radha Whitaker and Marshall Colin   STUDY:  BI US BREAST LIMITED LEFT;  3/28/2025 11:45 am      ACCESSION NUMBER(S):  [id number]      ORDERING CLINICIAN:  TJ KAM      INDICATION:  The patient presents with a left-sided palpable lump which she has  noticed for 1 day. The patient is status post left mastectomy and  reconstruction.      COMPARISON:  None.      FINDINGS:  Targeted ultrasound was performed of the left reconstructed breast by  a registered sonographer with elastography.      At 11 o'clock below the clavicle at the patient's palpable area of  concern, there is an oval indistinct heterogeneous subcutaneous mass  with hypoechoic and hyperechoic components measuring 0.4 x 0.3 x 0.3  cm which demonstrates minimal internal vascularity and is soft " on  elastography. This is probably benign and may represent an area of  developing fat necrosis.      Impression: Probably benign area of probable developing fat necrosis at the  patient's palpable area of concern. Recommend short-term ultrasound  follow-up in 3 months.      BI-RADS CATEGORY:  BI-RADS Category:  3 Probably Benign.  Recommendation:  Short-term Interval Follow-up Imaging.  Recommended Date:  3 Months.  Laterality:  Left.      For any future breast imaging appointments, please call [phone]).      I personally reviewed the images/study and I agree with the breast  imaging fellow, Goran Malagon D.O., findings as stated. This study  was interpreted at [facility],  [place], [place].      MACRO:  None      Signed by: Radha Whitaker 3/28/2025 1:54 PM  Dictation workstation:   [id number]    Assessment/Plan   Problem List Items Addressed This Visit    None  Visit Diagnoses         Cellulitis of other specified site    -  Primary    Relevant Medications    cephalexin (Keflex) 500 mg capsule          Cellulitis - acute issue, uncontrolled, initial encounter. On left arm, where she had LN bx from breast ca. Ddx mild cellulitis vs insect bite vs trauma. Abx to cover infxn. Advised f/u if no relief of sx or changes.     current treatment plan is effective, no change in therapy, orders and follow up as documented in EMR, reviewed medications and side effects in detail     Return if problem recurs,  worsens, or new problem develops.           ROMAINE CARRASCO MD, [facility]  Department of Family Medicine of [facility] - Primary Care         [1]   Social History  Tobacco Use    Smoking status: Never     Passive exposure: Never    Smokeless tobacco: Never   Vaping Use    Vaping status: Never Used   Substance Use Topics    Alcohol use: Yes     Comment: Socially    Drug use: Never   [2] No family history on file.  [3] No Known Allergies  [4]    Current Outpatient Medications   Medication Sig Dispense Refill    fexofenadine-pseudoephedrine (Allegra-D 24) 180-240 mg 24 hr tablet Take 1 tablet by mouth once daily. Do not crush, chew, or split.      levonorgestrel (Mirena) 21 mcg/24 hours (8 yrs) 52 mg IUD 52 mg by intrauterine route once daily.      tamoxifen (Nolvadex) 20 mg tablet Take 1 tablet (20 mg total) by mouth once daily.      cephalexin (Keflex) 500 mg capsule Take 1 capsule (500 mg) by mouth 2 times a day for 7 days. 14 capsule 0     No current facility-administered medications for this visit.

## 2025-05-01 ENCOUNTER — TELEPHONE (OUTPATIENT)
Dept: CARDIAC SURGERY | Facility: HOSPITAL | Age: 35
End: 2025-05-01
Payer: COMMERCIAL

## 2025-05-06 ENCOUNTER — APPOINTMENT (OUTPATIENT)
Dept: HEMATOLOGY/ONCOLOGY | Facility: HOSPITAL | Age: 35
End: 2025-05-06
Payer: COMMERCIAL

## 2025-05-07 ENCOUNTER — HOSPITAL ENCOUNTER (OUTPATIENT)
Dept: VASCULAR MEDICINE | Facility: HOSPITAL | Age: 35
Discharge: HOME | End: 2025-05-07
Payer: COMMERCIAL

## 2025-05-07 ENCOUNTER — OFFICE VISIT (OUTPATIENT)
Dept: PRIMARY CARE | Facility: CLINIC | Age: 35
End: 2025-05-07
Payer: COMMERCIAL

## 2025-05-07 VITALS
HEART RATE: 72 BPM | DIASTOLIC BLOOD PRESSURE: 90 MMHG | SYSTOLIC BLOOD PRESSURE: 143 MMHG | WEIGHT: 210 LBS | BODY MASS INDEX: 38.4 KG/M2 | TEMPERATURE: 98 F | OXYGEN SATURATION: 96 %

## 2025-05-07 DIAGNOSIS — I89.0 LYMPHEDEMA, NOT ELSEWHERE CLASSIFIED: ICD-10-CM

## 2025-05-07 DIAGNOSIS — M79.602 PAIN IN LEFT ARM: ICD-10-CM

## 2025-05-07 DIAGNOSIS — M79.89 OTHER SPECIFIED SOFT TISSUE DISORDERS: ICD-10-CM

## 2025-05-07 DIAGNOSIS — R60.1 GENERALIZED EDEMA: ICD-10-CM

## 2025-05-07 DIAGNOSIS — R60.0 EDEMA OF LEFT UPPER EXTREMITY: ICD-10-CM

## 2025-05-07 DIAGNOSIS — L03.818 CELLULITIS OF OTHER SPECIFIED SITE: Primary | ICD-10-CM

## 2025-05-07 DIAGNOSIS — R60.0 EDEMA OF LEFT UPPER EXTREMITY: Primary | ICD-10-CM

## 2025-05-07 PROCEDURE — 99214 OFFICE O/P EST MOD 30 MIN: CPT | Performed by: STUDENT IN AN ORGANIZED HEALTH CARE EDUCATION/TRAINING PROGRAM

## 2025-05-07 PROCEDURE — 93971 EXTREMITY STUDY: CPT

## 2025-05-07 PROCEDURE — 1036F TOBACCO NON-USER: CPT | Performed by: STUDENT IN AN ORGANIZED HEALTH CARE EDUCATION/TRAINING PROGRAM

## 2025-05-07 PROCEDURE — 93971 EXTREMITY STUDY: CPT | Performed by: SURGERY

## 2025-05-07 RX ORDER — DOXYCYCLINE 100 MG/1
100 CAPSULE ORAL 2 TIMES DAILY
Qty: 14 CAPSULE | Refills: 0 | Status: SHIPPED | OUTPATIENT
Start: 2025-05-07 | End: 2025-05-14

## 2025-05-07 ASSESSMENT — ENCOUNTER SYMPTOMS
LOSS OF SENSATION IN FEET: 0
OCCASIONAL FEELINGS OF UNSTEADINESS: 0
DEPRESSION: 0

## 2025-05-07 NOTE — PROGRESS NOTES
[facility] - Primary Care  [address], [address]  [place] [address]    Subjective     Patient ID: Kirsty Shrestha is a 35 y.o. female who presents for  Follow-up (Left arm no better)     Left arm issue. Having some arm discomfort, a line running from the inner elbow to the arm pit. Feels breast is also swollen. No redness. No fever chills, or body aches. No redness on the areas of the line. Some bumpy feeling in the arm pit. Denies any wound, pimple, bite, or scratch on the arm prior to this occurring. Did have a course of abx and finished it.    She has hx of breast surgery with lymph node removal from that left side. She does have a repeat US of the breast ordered.        Review of Systems   All other systems reviewed and are negative.      Social History[1]  Family History[2]  RX Allergies[3]   Current Medications[4]    /90 (BP Location: Right arm, Patient Position: Sitting, BP Cuff Size: Adult)   Pulse 72   Temp 36.7 °C (98 °F) (Temporal)   Wt 95.3 kg (210 lb)   SpO2 96%   BMI 38.40 kg/m²      Objective   Physical Exam  Vitals reviewed.   Constitutional:       Appearance: Normal appearance.   Eyes:      Extraocular Movements: Extraocular movements intact.      Pupils: Pupils are equal, round, and reactive to light.   Cardiovascular:      Rate and Rhythm: Normal rate and regular rhythm.      Pulses: Normal pulses.      Heart sounds: Normal heart sounds.   Pulmonary:      Effort: Pulmonary effort is normal.      Breath sounds: Normal breath sounds.   Musculoskeletal:         General: Normal range of motion.      Cervical back: Normal range of motion and neck supple.   Skin:     Comments: No erythematous linear indentation running from left cubital fossa on the medial aspect upwards towards the axillae. There is some palpable lymph nodes in the axillae. Some mild induration near the medical aspect of the cubital fossa. Diffuse edema of the left versus right upper extremity. Pulse is  "intact. Cap refill normal. No color change of the skin.   Neurological:      General: No focal deficit present.      Mental Status: She is alert.   Psychiatric:         Mood and Affect: Mood normal.         Behavior: Behavior normal.           Labs:   Lab Results   Component Value Date    WBC 7.0 11/09/2024    HGB 12.2 11/09/2024    HCT 35.9 (L) 11/09/2024     11/09/2024    TSH 0.26 (L) 07/15/2024    INR 1.0 05/19/2023     Lab Results   Component Value Date     11/09/2024    K 3.5 11/09/2024     (H) 11/09/2024    BUN 11 11/09/2024    CREATININE 0.85 11/09/2024    GLUCOSE 105 (H) 11/09/2024    CALCIUM 8.6 11/09/2024    PROT 7.2 11/09/2024    BILITOT 0.3 11/09/2024    ALKPHOS 76 11/09/2024    AST 20 11/09/2024    ALT 18 11/09/2024     Lab Results   Component Value Date    CHOL 198 07/15/2024    CHOL 175 10/17/2023      Lab Results   Component Value Date    TRIG 103 10/17/2023      Lab Results   Component Value Date    HDL 44.7 07/15/2024    HDL 54.0 10/17/2023     Lab Results   Component Value Date    LDLCALC 100 (H) 10/17/2023      Lab Results   Component Value Date    VLDL 21 10/17/2023    No components found for: \"CHOLHDLRATI0\"    No data recorded    Imaging/Testing: BI US breast limited left  Narrative: Interpreted By:  Radha Whitaker and Marshall Colin   STUDY:  BI US BREAST LIMITED LEFT;  3/28/2025 11:45 am      ACCESSION NUMBER(S):  [id number]      ORDERING CLINICIAN:  TJ KAM      INDICATION:  The patient presents with a left-sided palpable lump which she has  noticed for 1 day. The patient is status post left mastectomy and  reconstruction.      COMPARISON:  None.      FINDINGS:  Targeted ultrasound was performed of the left reconstructed breast by  a registered sonographer with elastography.      At 11 o'clock below the clavicle at the patient's palpable area of  concern, there is an oval indistinct heterogeneous subcutaneous mass  with hypoechoic and hyperechoic " components measuring 0.4 x 0.3 x 0.3  cm which demonstrates minimal internal vascularity and is soft on  elastography. This is probably benign and may represent an area of  developing fat necrosis.      Impression: Probably benign area of probable developing fat necrosis at the  patient's palpable area of concern. Recommend short-term ultrasound  follow-up in 3 months.      BI-RADS CATEGORY:  BI-RADS Category:  3 Probably Benign.  Recommendation:  Short-term Interval Follow-up Imaging.  Recommended Date:  3 Months.  Laterality:  Left.      For any future breast imaging appointments, please call [phone]  [phone]).      I personally reviewed the images/study and I agree with the breast  imaging fellow, Goran Malagon D.O., findings as stated. This study  was interpreted at [facility],  [place], [place].      MACRO:  None      Signed by: Radha Whitaker 3/28/2025 1:54 PM  Dictation workstation:   [id number]    Assessment/Plan   Problem List Items Addressed This Visit    None  Visit Diagnoses         Edema of left upper extremity    -  Primary    Relevant Orders    Vascular US Upper Extremity Arterial Duplex Left          Edema of LUE - acute issue, uncontrolled, initial encounter. DDX chronic lymphatic changes dt surgery vs DVT vs lymphangitis. Less likely lymphangitis dt lack of s/sx of infection or systemic sx. Will get an US doppler r/o DVT. If neg, may empirically try another round of abx, broad spectrum like Augmentin or doxy.    Will await US results. Call pt with next steps.    orders and follow up as documented in EMR, reviewed medications and side effects in detail     Return if problem recurs,  worsens, or new problem develops.  Short follow up         ROMAINE CARRASCO MD, [facility]  Department of Family Medicine of [facility] - Primary Care         [1]   Social History  Tobacco Use    Smoking status: Never     Passive exposure: Never     Smokeless tobacco: Never   Vaping Use    Vaping status: Never Used   Substance Use Topics    Alcohol use: Yes     Comment: Socially    Drug use: Never   [2] No family history on file.  [3] No Known Allergies  [4]   Current Outpatient Medications   Medication Sig Dispense Refill    fexofenadine-pseudoephedrine (Allegra-D 24) 180-240 mg 24 hr tablet Take 1 tablet by mouth once daily. Do not crush, chew, or split.      levonorgestrel (Mirena) 21 mcg/24 hours (8 yrs) 52 mg IUD 52 mg by intrauterine route once daily.      tamoxifen (Nolvadex) 20 mg tablet Take 1 tablet (20 mg total) by mouth once daily.       No current facility-administered medications for this visit.

## 2025-05-09 ENCOUNTER — APPOINTMENT (OUTPATIENT)
Dept: SURGICAL ONCOLOGY | Facility: CLINIC | Age: 35
End: 2025-05-09
Payer: COMMERCIAL

## 2025-05-12 ENCOUNTER — OFFICE VISIT (OUTPATIENT)
Dept: HEMATOLOGY/ONCOLOGY | Facility: HOSPITAL | Age: 35
End: 2025-05-12
Payer: COMMERCIAL

## 2025-05-12 VITALS
RESPIRATION RATE: 18 BRPM | TEMPERATURE: 97.5 F | HEART RATE: 66 BPM | DIASTOLIC BLOOD PRESSURE: 68 MMHG | WEIGHT: 208.78 LBS | SYSTOLIC BLOOD PRESSURE: 145 MMHG | OXYGEN SATURATION: 100 % | BODY MASS INDEX: 38.18 KG/M2

## 2025-05-12 DIAGNOSIS — Z17.0 MALIGNANT NEOPLASM OF OVERLAPPING SITES OF LEFT BREAST IN FEMALE, ESTROGEN RECEPTOR POSITIVE: ICD-10-CM

## 2025-05-12 DIAGNOSIS — I89.0 LYMPHEDEMA: Primary | ICD-10-CM

## 2025-05-12 DIAGNOSIS — M79.89 LEFT ARM SWELLING: ICD-10-CM

## 2025-05-12 DIAGNOSIS — Z51.5 ENCOUNTER FOR PALLIATIVE CARE: ICD-10-CM

## 2025-05-12 DIAGNOSIS — C50.812 MALIGNANT NEOPLASM OF OVERLAPPING SITES OF LEFT BREAST IN FEMALE, ESTROGEN RECEPTOR POSITIVE: ICD-10-CM

## 2025-05-12 PROCEDURE — 99215 OFFICE O/P EST HI 40 MIN: CPT | Performed by: NURSE PRACTITIONER

## 2025-05-12 ASSESSMENT — PAIN SCALES - GENERAL: PAINLEVEL_OUTOF10: 4

## 2025-05-12 ASSESSMENT — ENCOUNTER SYMPTOMS
DEPRESSION: 0
NERVOUS/ANXIOUS: 0
CHILLS: 0
CONFUSION: 0
FATIGUE: 0
FEVER: 0
HOT FLASHES: 1

## 2025-05-12 NOTE — PROGRESS NOTES
Patient ID: Kirsty Shrestha is a 35 y.o. female.  Referring Physician: No referring provider defined for this encounter.  Primary Care Provider: Shruthi Bullard MD  Oncologic History:  4/8/22: Dx MG/US performed. A 1.3 x 0.6 x 0.7 cm hypoechoic mass seen in left breast (11:00, 13 cm FN). An additional 0.9 cm mass at 2:00 (8 cm FN) was also seen, possibly benign. ALN’s appeared unremarkable.  4/20/22: Left breast mass biopsy (11:00) showed IDC, grade 3. ER >95%, NH >95%, Her2-negative (2+ IHC; MAHNAZ ratio 1.9, copy #3.6). Mammaprint high-risk (-0.615), luminal B type. Maximum diameter 0.7 cm.  4/28/22: Biopsy of 2:00 lesion benign. Left ALN negative.  Genetic Testing completed. Negative  5/23/2023 bilateral stage 2 breast recosntruction with removal of TE and placement of new IDEA: saline implants 610cc with autologous fat grafting of 160cc per breast and abdominoplasty with rectus plication and umbilical transposition   7/14/22: Bilateral mastectomy with implant reconstruction and left SLNBx performed. No residual carcinoma seen in left breast. Right breast benign. 0/1 left SLN’s involved. pT1bN0 (IA).  08/18/2022: Started on Tamoxifen 20 mg daily by Dr. Connell. Patient did not follow-up with medical oncology after that and stopped Tamoxifen after her prescription run out  12/22/2023: Patient underwent an US-guided left breast biopsy of a cystic mass at 12:00. Pathology was consistent with Invasive ductal carcinoma, grade 3; ER greater than 95%, NH greater than 95%, HER2 negative   1/31/2024: Patient underwent left breast lumpectomy/wide excision of breast cancer and SLNB. Pathology revealed multifocal disease with 4 separate nodules measuring 32, 11, 7 and 4 mm respectively with 0/1 SLNs involved. With invasive cancer present at lateral margin, within microns of posterior margin and <1mm of anterior and medial margins. Repeat receptor of mass at 12:00 showed Estrogen Receptor positive at 95%, Progesteron receptor at  >95% and her2 equivocal and not amplified at 1.1    02/26/2024: Patient underwent re-excision  04/16/2024: oocyte retrieval, 8 mature oocytes cryopreserved  04/23/2024: Cycle #1 of TC attempted and discontinued due to hypersensitivity reaction  07/16/2024: Received the last cycle of TC  07/29-08/20/2024 radiation therapy to L breast, total dose 5280cGy    Subjective    Ms. Shrestha is a pleasant 34 y.o. premenopausal AA female with prior Stage: IA (pT1b pN0) invasive ductal carcinoma of the left breast; ER >95%, MA >95%, Her2-negative, Mammaprint: High-risk, luminal B, -0.615. Now with locally recurrent left invasive ductal carcinoma, stage IB (rPT2, pN0 M0).  The patient's recurrent breast cancer was diagnosed on December 22, 2023, and is grade 3, Estrogen Receptor positive at 95%, Progesteron receptor at >95% and her2 equivocal.    Returns today in follow up regarding issues unique to young adults with cancer.     She remains on oral endocrine therapy, tamoxifen began 08/2024 and ovarian suppression therapy with leuprolide 3mo dose kit. Relays she thinks that she needs to change from tamoxifen to anastrazole but missed follow up with Dr. Carrasquillo.     Continues to experience hot flashes multiple times/day and night, lasting minutes, at times drenching sweats.     Hot flashes persist, no sexual dysfunction.     Mood down at times, some impaired thinking - twice/month. Is already using supportive measures, fan, layers, sleeping with window open. Still prefers a non-medication based approach.     Continues to experience L arm swelling/pain and L breast pain. Has been on multiple antibiotics without resolution, had dopplers performed without venous insufficiency. Has not been evaluated by PT for lymphedema or sleeve fitting.     Physically, she has been doing well. She was previously exercising regularly - going to the gym to do cardio and strength training. More recently she has not been as active. Does eat range of  foods but not mostly plant based diet. Is concerned about weight gain - currently ~208lbs, goal is 175 or below. Struggling to lose weight. Did get connected to PCP, wondering if they will prescribe GLP agents for weight loss.     Continues in monogamous relationship with male partner. He does not have children, they are thinking about having a baby. Her 13 year old daughter is doing well. Still without menses. Is sexually active, denies concerns with sexual function.     Emotionally, she relays mood is fluctuant. At times feels down and depressed and doesn't want to be around others - this occurs several days of the month. Continues to feel that she and her daughter are well-supported.     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 She is enjoying this work and does not want to work full-time currently.    Social History: Lives in [place] with daughter 13 y.o. 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 In a relationship. Grew up in the area and has family nearby. Does not smoke, vape, or use recreational drugs. Previously drank 5 drinks/week. Now not using ETOH.  Review of Systems   Constitutional:  Negative for chills, fatigue and fever.   Endocrine: Positive for hot flashes.   Genitourinary:  Negative for menstrual problem and pelvic pain.    Psychiatric/Behavioral:  Negative for confusion and depression. The patient is not nervous/anxious.       Objective   BSA: 2.04 meters squared  /68 (BP Location: Right arm, Patient Position: Sitting, BP Cuff Size: Adult)   Pulse 66   Temp 36.4 °C (97.5 °F) (Temporal)   Resp 18   Wt 94.7 kg (208 lb 12.4 oz)   SpO2 100%   BMI 38.18 kg/m²   Performance Status:  Asymptomatic    Current Outpatient Medications   Medication Instructions    doxycycline (VIBRAMYCIN) 100 mg, oral, 2 times daily, Take with at least 8 ounces (large glass) of water, do not lie down for 30  minutes after    fexofenadine-pseudoephedrine (Allegra-D 24) 180-240 mg 24 hr tablet 1 tablet, Daily    levonorgestrel (Mirena) 21 mcg/24 hours (8 yrs) 52 mg IUD 1 each, Daily RT    tamoxifen (NOLVADEX) 20 mg, Daily     No family history on file.  Oncology History   Malignant neoplasm of breast   11/28/2022 Initial Diagnosis    Malignant neoplasm of breast (CMS/HCC)     4/23/2024 - 7/16/2024 Chemotherapy    TC (DOCEtaxel / Cyclophosphamide), 21 Day Cycles     Chest wall recurrence of breast cancer, left   1/12/2024 Initial Diagnosis    Chest wall recurrence of breast cancer, left (CMS/HCC)     1/31/2024 Cancer Staged    Staging form: Breast, AJCC 8th Edition, Pathologic stage from 1/31/2024: Stage IB (rpT2, pN0(sn), cM0, G3, ER+, NE+, HER2-) - Signed by Julia Carrasquillo MD on 2/27/2024       Kirsty Shrestha  reports that she has never smoked. She has never been exposed to tobacco smoke. She has never used smokeless tobacco.  She  reports current alcohol use.  She  reports no history of drug use.    Physical Exam  Constitutional:       General: She is not in acute distress.     Appearance: Normal appearance. She is not ill-appearing.   Neurological:      General: No focal deficit present.      Mental Status: She is alert and oriented to person, place, and time.      Motor: No weakness.      Gait: Gait normal.   Psychiatric:         Mood and Affect: Mood normal.         Behavior: Behavior normal.         Thought Content: Thought content normal.         Judgment: Judgment normal.     No visits with results within 1 Week(s) from this visit.   Latest known visit with results is:   Lab on 02/18/2025   Component Date Value Ref Range Status    Estradiol 02/18/2025 <19  pg/mL Final      Assessment/Plan    Kirsty Shrestha is a 35 y.o. F with prior Stage: IA (pT1b pN0) invasive ductal carcinoma of the left breast; ER >95%, NE >95%, Her2-negative, s/p bilateral mastectomies but lost to follow up on oral endocrine therapy.  Represented in December with locally recurrent left invasive ductal carcinoma, stage IB (rPT2, pN0 M0) ER/RI+ HER2 equivocal. S/p L lumpectomy with wide excision 2024 followed by re-excision 2024 d/t positive margins. S/p chemotherapy with docetaxol and cyclophosphamide x 4 cycles, and chest irradiation, currently on oral endocrine therapy with tamoxifen and ovarian suppression therapy with leuprolide.     #L arm/breast swelling: concern for lymphedema, has been on multiple course of abx to tx cellulitis without relief, has had breast US and LUE US without obvious etiology  - Referral to physical therapy for lymphedema evaluation, management, and sleeve fitting  - Provided patient with patient information about lymphedema, instructed on gentle ROM exercises to perform in interim    #Psychosocial: young adult and parent with cancer  - Validated her emotions and experience as a young person with cancer, encouraged exploration of emotions/processing of experience  - Connected to [facility] and engaged in programs  - Connected to young adult oncology program, will continue to follow for ongoing support  - Receiving young adult social meet up invitations via [employer]  - Connected to [facility]ACK[facility]     #Hot flashes: due to ovarian suppression/low circulating estrogen  - Continue supportive interventions, dressing in layers, fan at night, avoiding triggers  - Discussed options for medication management, provided patient with information about fezolinetant, gabapentin, and duloxetine - she is not currently interested in medication management    #Sexual Dysfunction/Contraception:  - Have previously discussed the seriousness of getting pregnant while receiving endocrine therapy due to the harmful effects this could have on the fetus, and on her cancer treatment given the decreased availability of medical  services.   - Currently has mirena IUD in place -- safe form of contraception for breast cancer  with hormone sensitivity  - Mild symptoms of dysparaneuria - instructed to use topical lubricant with sex - silicone or water based and monitor  - She was encouraged to reach out if she experiences any concerns regarding intimacy    #Diet/Exercise/Healthy Lifestyle:  - Encouraged patient to resume exercise, and aim to incorporate it into weekly routine in a way that will allow for consistency rather than number of days  - Research indicates consumption of a healthy, plant based diet not only decreases cancer risk, but also has been found to improve survival in cancer patients who partake in a healthy diet - encouraged small changes in diet such as incorporation of healthier snacks/fruits/vegetables to translate into longer term lifestyle changes  - Encouraged patient to continue to abstain from alcohol, tobacco, and recreational drugs  - Will identify PCP as requested and place referral once available    #Risk for infertility:  - Previously discussed the damaging effect cancer treatment can have on the ovaries.   - Previously discussed natural age related decline in fertility and menopause that occurs as a result of ovarian aging, and that cancer treatments can accellerate that progression and diminish ovarian reserve.  - Individuals may experience varying degrees of short or long term ovarian dysfunction and amenorrhea, and that this is dependent upon type of cancer treatment, cumulative dose, and patients age.   - Loss of ovarian function may be permanent or temporary.  - Amenorrhea may be temporary or permanent -- temporary amenorrhea results from destruction of maturing follicles whereas permanent amenorrhea results from depletion of viable primordial follicles.  - Risk to fertility is INTERMEDIATE based on cancer treatment of Cyclophosphamide at doses ~5000mg  - Baseline AMH testing 03.08.24 1.25  - Underwent ovarian stimulation and oocyte retrieval 04/16 retrieved 8 mature oocytes -- at [facility] in long term  storage  - Began endocrine therapy with tamoxifen 08/2024 - reviewed again POSITIVE trial and that at the earliest could consider family building in May 2026 - needs at least 18 months on therapy followed by 3 month washout    #Genetic Risk: young adult with cancer and personal family history of cancer  - Has been seen by genetic risk and no mutations identified    Follow up in 1 month per patient request    The amount of time that I spent with the patient:  Prep: 5  Time spend directly with patient: 35  Coordinating care: 5  Documentation: 5  Other time:    Total time spent on encounter: 50             Kari Zhao, PUSHPA-CNP

## 2025-05-13 DIAGNOSIS — C50.812 MALIGNANT NEOPLASM OF OVERLAPPING SITES OF LEFT BREAST IN FEMALE, ESTROGEN RECEPTOR POSITIVE: Primary | ICD-10-CM

## 2025-05-13 DIAGNOSIS — Z17.0 MALIGNANT NEOPLASM OF OVERLAPPING SITES OF LEFT BREAST IN FEMALE, ESTROGEN RECEPTOR POSITIVE: Primary | ICD-10-CM

## 2025-05-13 DIAGNOSIS — Z00.00 PREVENTATIVE HEALTH CARE: ICD-10-CM

## 2025-05-19 ENCOUNTER — APPOINTMENT (OUTPATIENT)
Dept: RADIOLOGY | Facility: CLINIC | Age: 35
End: 2025-05-19
Payer: COMMERCIAL

## 2025-05-19 DIAGNOSIS — Z17.0 MALIGNANT NEOPLASM OF OVERLAPPING SITES OF BREAST IN FEMALE, ESTROGEN RECEPTOR POSITIVE, UNSPECIFIED LATERALITY: ICD-10-CM

## 2025-05-19 DIAGNOSIS — C50.819 MALIGNANT NEOPLASM OF OVERLAPPING SITES OF BREAST IN FEMALE, ESTROGEN RECEPTOR POSITIVE, UNSPECIFIED LATERALITY: ICD-10-CM

## 2025-05-19 PROCEDURE — 77080 DXA BONE DENSITY AXIAL: CPT

## 2025-05-19 PROCEDURE — 77080 DXA BONE DENSITY AXIAL: CPT | Performed by: RADIOLOGY

## 2025-05-19 NOTE — PROGRESS NOTES
BREAST SURGICAL ONCOLOGY FOLLOW UP     Subjective   Kirsty Shrestha is a 35 y.o. female presents today for follow up carcinoma of the left breast.     Treatment history:  Cancer History:   Treatment Synopsis:    Diagnosis: April 2022 Invasive ductal carcinoma of the left breast; ER >95%, SD >95%, Her2-negative  Stage: IA (pT1b pN0)  Mammaprint: High-risk, luminal B, -0.615     -Palpable lump in left breast.  -4/8/22: Dx MG/US performed. A 1.3 x 0.6 x 0.7 cm hypoechoic mass seen in left breast (11:00, 13 cm FN). An additional 0.9 cm mass at 2:00 (8 cm FN) was also seen, possibly benign. ALN’s appeared unremarkable.  -4/20/22: Left breast mass biopsy (11:00) showed IDC, grade 3. ER >95%, SD >95%, Her2-negative (2+ IHC; MAHNAZ ratio 1.9, copy #3.6). Mammaprint high-risk (-0.615), luminal B type. Maximum diameter 0.7 cm.  -4/28/22: Biopsy of 2:00 lesion benign. Left ALN negative.  -7/14/22: Bilateral mastectomy with implant reconstruction and left SLNBx performed. No residual carcinoma seen in left breast. Right breast benign. 0/1 left SLN’s involved. pT1bN0 (IA).     December 2023 Invasive ductal carcinoma, grade 3; ER greater than 95%, SD greater than 95%, HER2 negative     -1/31/23: Wide excision of left chest wall recurrence and SLNB.  Multiple tumors the largest being 3.2 cm- positive lateral margin; invasive ductal carcinoma, grade 3; ER> 95%, SD> 95%, HER2 negative ; SLN 0/1  -2/22/24: reexcision of lateral margin (negative) and removal of left breast implant  -2/26/24: Capsulectomy and placement of new left breast implant  - 4/16/2024: Patient underwent egg retrieval. Total eggs retrieved = 8  -4/23/2024: Cycle #1 of TC attempted and discontinued due to hypersensitivity reaction  -7/16/2024: Received the last cycle of TC  -7/29/24-8/20/24: Radiation therapy to left breast , 5280 cGy     Surgery: 7/14/22: Bilateral mastectomy with implant reconstruction and left SLNB; 1/31/23: Wide excision of left chest wall  recurrence and SLNB; 2/22/2024: Reexcision and removal of left breast implant; 2/26/2024: Capsulectomy and placement of new left breast implant  Radiation: 7/29/24-8/20/24: Radiation therapy to left breast, 5280 cGy  Systemic therapy: 4/23/24-7/16/24: TC x4     Cancer Staging   Chest wall recurrence of breast cancer, left  Staging form: Breast, AJCC 8th Edition  - Pathologic stage from 1/31/2024: Stage IB - Signed by Julia Carrasquillo MD on 2/27/2024  pT2  pN0  cM0  Kanika grade: G3  ER Status: Positive  NM Status: Positive  HER2 Status: Negative        Review of Systems   Constitutional symptoms: Denies generalized fatigue.  Denies weight change, fevers/chills, difficulty sleeping   Eyes: Denies double vision, glaucoma, cataracts.  Ear/nose/throat/mouth: Denies hearing changes, sore throat, sinus problems.  Cardiovascular: No chest pain.  Denies irregular heartbeat.  Denies ankle swelling.  Respiratory: No wheezing, cough, or shortness of breath.  Gastrointestinal: No abdominal pain,  No nausea/vomiting.  No indigestion/heartburn.  No change in bowel habits.  No constipation or diarrhea.   Genitourinary: No urinary incontinence.  No urinary frequency.  No painful urination.  Musculoskeletal: No bone pain, no muscle pain, no joint pain.   Integumentary: No rash. No masses.  No changes in moles.  No easy bruising.  Neurological: No headaches.  No tremors. No numbness/tingling.  Psychiatric: No anxiety. No depression.  Endocrine: No excessive thirst.  Not too hot or too cold.  Not tired or fatigued.    Hematological/lymphatic: No swollen glands or blood clotting problems.  No bruising.    PHYSICAL EXAM:    General: Alert and oriented x 3.  Mood and affect are appropriate.  Neck: supple, no masses, no cervical adenopathy.  Cardiovascular: no lower extremity edema.  Pulmonary: breathing non labored on room air.  GI: Abdomen soft, no masses. No hepatomegaly or splenomegaly.  Lymph nodes: No supraclavicular or axillary  adenopathy bilaterally.  Musculoskeletal: Full range of motion in the upper extremities bilaterally.  Neuro: denies dizziness, tremors  Physical Exam      Assessment/Plan     Stage IA left breast cancer diagnosed on 4/20/2022  [id number]   IDC, grade 3. ER >95%, NJ >95%, Her2-negative  2.   Stage IB left chest wall recurrence diagnosed 12/2023   [id number]    Invasive ductal carcinoma, grade 3; ER greater than 95%, NJ greater than 95%, HER2 negative     Clinical breast exam is normal.  There is no evidence of cancer recurrence. ***    Continue follow up with medical oncology as scheduled.    Will follow up with me in 1 year at the time of mammogram or sooner if there are any breast concerns. ***  Renee Pugh MD

## 2025-05-27 ENCOUNTER — OFFICE VISIT (OUTPATIENT)
Dept: HEMATOLOGY/ONCOLOGY | Facility: HOSPITAL | Age: 35
End: 2025-05-27
Payer: COMMERCIAL

## 2025-05-27 ENCOUNTER — APPOINTMENT (OUTPATIENT)
Dept: SURGICAL ONCOLOGY | Facility: CLINIC | Age: 35
End: 2025-05-27
Payer: COMMERCIAL

## 2025-05-27 VITALS
WEIGHT: 214.51 LBS | TEMPERATURE: 97.5 F | RESPIRATION RATE: 18 BRPM | DIASTOLIC BLOOD PRESSURE: 79 MMHG | OXYGEN SATURATION: 94 % | BODY MASS INDEX: 39.47 KG/M2 | SYSTOLIC BLOOD PRESSURE: 150 MMHG | HEIGHT: 62 IN | HEART RATE: 81 BPM

## 2025-05-27 DIAGNOSIS — C50.812 MALIGNANT NEOPLASM OF OVERLAPPING SITES OF LEFT BREAST IN FEMALE, ESTROGEN RECEPTOR POSITIVE: Primary | ICD-10-CM

## 2025-05-27 DIAGNOSIS — C79.89 CHEST WALL RECURRENCE OF BREAST CANCER, LEFT: Primary | ICD-10-CM

## 2025-05-27 DIAGNOSIS — Z17.0 MALIGNANT NEOPLASM OF OVERLAPPING SITES OF LEFT BREAST IN FEMALE, ESTROGEN RECEPTOR POSITIVE: Primary | ICD-10-CM

## 2025-05-27 DIAGNOSIS — C50.912 CHEST WALL RECURRENCE OF BREAST CANCER, LEFT: Primary | ICD-10-CM

## 2025-05-27 PROCEDURE — 99214 OFFICE O/P EST MOD 30 MIN: CPT | Performed by: INTERNAL MEDICINE

## 2025-05-27 PROCEDURE — 3008F BODY MASS INDEX DOCD: CPT | Performed by: INTERNAL MEDICINE

## 2025-05-27 RX ORDER — EXEMESTANE 25 MG/1
25 TABLET ORAL DAILY
Qty: 30 TABLET | Refills: 5 | Status: SHIPPED | OUTPATIENT
Start: 2025-05-27 | End: 2025-11-23

## 2025-05-27 ASSESSMENT — PAIN SCALES - GENERAL: PAINLEVEL_OUTOF10: 0-NO PAIN
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
## 2025-06-10 ENCOUNTER — EVALUATION (OUTPATIENT)
Dept: PHYSICAL THERAPY | Facility: HOSPITAL | Age: 35
End: 2025-06-10
Payer: COMMERCIAL

## 2025-06-10 DIAGNOSIS — L90.5 AXILLARY WEB SYNDROME: Primary | ICD-10-CM

## 2025-06-10 DIAGNOSIS — M79.89 LEFT ARM SWELLING: ICD-10-CM

## 2025-06-10 DIAGNOSIS — I89.0 LYMPHEDEMA: ICD-10-CM

## 2025-06-10 DIAGNOSIS — L76.82 AXILLARY WEB SYNDROME: Primary | ICD-10-CM

## 2025-06-10 DIAGNOSIS — Z17.0 MALIGNANT NEOPLASM OF OVERLAPPING SITES OF LEFT BREAST IN FEMALE, ESTROGEN RECEPTOR POSITIVE: ICD-10-CM

## 2025-06-10 DIAGNOSIS — C50.812 MALIGNANT NEOPLASM OF OVERLAPPING SITES OF LEFT BREAST IN FEMALE, ESTROGEN RECEPTOR POSITIVE: ICD-10-CM

## 2025-06-10 PROCEDURE — 97535 SELF CARE MNGMENT TRAINING: CPT | Mod: GP | Performed by: PHYSICAL THERAPIST

## 2025-06-10 PROCEDURE — 97162 PT EVAL MOD COMPLEX 30 MIN: CPT | Mod: GP | Performed by: PHYSICAL THERAPIST

## 2025-06-10 ASSESSMENT — ENCOUNTER SYMPTOMS
DEPRESSION: 0
LOSS OF SENSATION IN FEET: 0
OCCASIONAL FEELINGS OF UNSTEADINESS: 0

## 2025-06-10 NOTE — PROGRESS NOTES
Physical Therapy Evaluation and Treatment      Patient Name:Kirsty Shrestha   MRN:[id number]   Today's Date:6/10/2025   Referred by: Kari Zhao   Time Calculation  Start Time: 0740  Stop Time: 0830  Time Calculation (min): 50 min    PT Evaluation Time Entry  PT Evaluation (Moderate) Time Entry: 40  PT Therapeutic Procedures Time Entry  Self-Care/Home Mgmt Training: 10       Insurance  Visit number: 1   Approved number of visits: 30  Auth required: No  Authorization date range:   Onset Date: 5/12/2025    Payor: JORDAN / Plan: JORDAN / Product Type: *No Product type* /     Assessment:   36 yo female with hx of L breast cancer s/p B mastectomy and L SNLB 2022, s/p reconstruction. Recurrence of L breast cancer s/p lumpectomy 2024, pt states continued some tightness L breast likely due to decreased scar mobility L breast especially inferior T-shape scar, recommend scar massage for sx management. No breast swelling appreciated today, will continue to monitor.  Pt also has developed some pain and tightness in what appears to be cording L UE, continues with some residual cording noted antecubital area and ant upper arm, suspect this is correlated to her increased weight training for L UE likely too fast, sx has much improved at this time, pt is educated on self management of lymphedema and exercise principle, will work on STM and cording release to help with further sx management. Pt verbalized understanding.       Rehab Potential: good  Clinical Presentation: Evolving with changing characteristics   Evaluation Complexity: moderate      Plan:   Visit number: 1   PT Plan: Skilled PT  PT Frequency: 1 time per week  Duration: 8-10 weeks  Onset Date: 05/12/25  Number of Treatments Authorized: jordan 30V  Rehab Potential: Good  Plan of Care Agreement: Patient    Planned interventions include: cording release, STM, scar massage L breast, monitor L UE swelling. Review shoulder and scapular strengthening  principle as needed.   Frequency and duration: 1x/week 8-10 weeks  Plan of care was developed with input and agreement by the patient.     Precautions/Fall Risk:  Fall Risk: None based on professional judgment  Pacemaker: no    Seizures: No    Post Op Movement/Restrictions: No:  Weight Bearing Status: As tolerated  Other Medical precautions:    Therapy Diagnosis:  Problem List Items Addressed This Visit           ICD-10-CM       Hematology and Neoplasia    Malignant neoplasm of overlapping sites of left breast in female, estrogen receptor positive C50.812, Z17.0    Relevant Orders    Follow Up In Physical Therapy       Musculoskeletal and Injuries    Left arm swelling M79.89    Relevant Orders    Follow Up In Physical Therapy       Skin    Axillary web 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 Axillary web syndrome  Follow Up In Physical Therapy      2. Lymphedema  Referral to Physical Therapy      3. Left arm swelling  Referral to Physical Therapy    Follow Up In Physical Therapy      4. Malignant neoplasm of overlapping sites of left breast in female, estrogen receptor positive  Referral to Physical Therapy    Follow Up In Physical Therapy                Subjective:  General:  General  Reason for Referral: I89.0 (ICD-10-CM) - Lymphedema  M79.89 (ICD-10-CM) - Left arm swelling  C50.812,Z17.0 (ICD-10-CM) - Malignant neoplasm of overlapping sites of left breast in female, estrogen receptor positive  Referred By: Kari Zhao CNP  Preferred Learning Style: verbal, visual, written  Precautions:  Precautions  GILDA Fall Risk Score (The score of 4 or more indicates an increased risk of falling): 0    HPI: L breast cancer s/p B mastectomy and L SLNB 7/14/22, s/p B breast reconstruction, on tamoxifen 2022, then recurrence 2023 s/p L lumpectomy and wide excision of breast  cancer, SLNB 1/31/24, reexcision 2/26/24, chemo and radiation treatment completed 8/2024,    Pt states noticed L arm swelling since April 2025, some fluctuation, lasted for a couple month, was tried on antibiotics with no improvement, difficulty with straighten arm, pt was seen oncology and referred to PT.  Pt noticed some improvement for 2 weeks,  still feels tightness L breast since 2024 lumpectomy.   Pt is LHD for writing, but heavier on R hand for lifting and carrying.     PMH: none significant other than L breast cancer.      SOCIAL HX/JOB STATUS: lives with significant other and children, independent with ADLs.      BASELINE FUNCTION:  walking or step aerobics, HIIT 3-4x/week since March 2025.     Pain:  Location of Pain: L breast tightness, L arm tender and pulling.   Current Pain Level (0-10): 2   High Pain Level (0-10): 8   Low Pain Level (0-10): 2  Pain Exacerbating Factors: reaching, sleeping, carrying etc when painful, now improved,   Pain Relieving Factors: rest    Patient Awareness: Patient is aware of  her diagnosis and prognosis.       Objective     UE circumference measure R/L:     Date/R/L 6/10/25       Thumb IP 5.8/5.5       Thumb  CMC 6.0/6.4       Palm 17.8/17.5       Wrist  15.1/14.8       5cm above wrist 18.0/17.4       10cm above wrist 22.1/21.5       15cm above wrist 26.6/26.0       20cm above wrist 26.7/25.8       elbow 26.3/24.5       5cm above elbow 29.8/28.8       10cm above elbow 31.7/30.2       15cm above elbow 36.4/32.5       Axilla 38.4/36.5       Length           ROM: R/L WNL B UE  Shoulder flex         abd             IR              ER  Elbow:  Wrist:    STRENGTH: R/L WNL B UE  Shoulder:  Elbow:  Wrist    Sensation: intact to light touch    Skin appearance/condition:   L breast inf incision with decreased scar mobility, slight numb to palpation, slight keloid, sup incision slight sensitive to palpation and decreased scar mobility, R breast inf incision with good scar mobility  Neg  sign of swelling L breast today  L arm +residual cording L ant elbow and upper arm, otherwise no other skin changes     LLIS 40%    Outcome Measures:  Other Measures  Lymphedema Life Impact Scale (LLIS): 40     Treatments:     Treatment Performed Today:      Treatment provided today:  Educated patient on lymphedema etiology, treatment rationale and expectations. All questions answered.  Educated on lymphedema self management, see attached handout.  Educated on importance of low and slow progression of strengthening. Instructed in self scar massage.      Response to Treatment: improved knowledge and understanding of condition    Education/Resources provided today: Home Program   [facility]:    OP EDUCATION:  Outpatient Education  Individual(s) Educated: Patient  Education Provided: Home Exercise Program, Lymphedema care, POC  Patient/Caregiver Demonstrated Understanding: yes  Plan of Care Discussed and Agreed Upon: yes  Patient Response to Education: Patient/Caregiver Verbalized Understanding of Information    Goals:  Patient's Goal for Treatment: Reduce symptoms and Return to prior level of function    To be met at time of discharge:  --no increased girth measurement of B UE.  -- Improve skin/tissue no longer has residual cording L UE.   -- Knowledgeable and compliant with home program, including lymphedema management and exercises.   -- Improve functional outcome on LLIS to <20%,

## 2025-06-13 ENCOUNTER — APPOINTMENT (OUTPATIENT)
Dept: PRIMARY CARE | Facility: CLINIC | Age: 35
End: 2025-06-13
Payer: COMMERCIAL

## 2025-06-26 ENCOUNTER — DOCUMENTATION (OUTPATIENT)
Dept: PHYSICAL THERAPY | Facility: HOSPITAL | Age: 35
End: 2025-06-26
Payer: COMMERCIAL

## 2025-06-27 ENCOUNTER — APPOINTMENT (OUTPATIENT)
Dept: RADIOLOGY | Facility: HOSPITAL | Age: 35
End: 2025-06-27
Payer: COMMERCIAL

## 2025-06-27 ENCOUNTER — APPOINTMENT (OUTPATIENT)
Dept: SURGICAL ONCOLOGY | Facility: HOSPITAL | Age: 35
End: 2025-06-27
Payer: COMMERCIAL
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
## 2025-06-30 ENCOUNTER — HOSPITAL ENCOUNTER (OUTPATIENT)
Dept: RADIOLOGY | Facility: HOSPITAL | Age: 35
Discharge: HOME | End: 2025-06-30
Payer: COMMERCIAL

## 2025-06-30 DIAGNOSIS — R92.8 ABNORMAL FINDING ON BREAST IMAGING: ICD-10-CM

## 2025-06-30 PROCEDURE — 76642 ULTRASOUND BREAST LIMITED: CPT | Mod: LT

## 2025-06-30 PROCEDURE — 76642 ULTRASOUND BREAST LIMITED: CPT | Mod: LEFT SIDE | Performed by: RADIOLOGY

## 2025-06-30 PROCEDURE — 76982 USE 1ST TARGET LESION: CPT | Mod: LT

## 2025-07-03 ENCOUNTER — OFFICE VISIT (OUTPATIENT)
Dept: SURGICAL ONCOLOGY | Facility: HOSPITAL | Age: 35
End: 2025-07-03
Payer: COMMERCIAL

## 2025-07-03 ENCOUNTER — TREATMENT (OUTPATIENT)
Dept: PHYSICAL THERAPY | Facility: HOSPITAL | Age: 35
End: 2025-07-03
Payer: COMMERCIAL

## 2025-07-03 VITALS
BODY MASS INDEX: 38.24 KG/M2 | OXYGEN SATURATION: 98 % | RESPIRATION RATE: 20 BRPM | DIASTOLIC BLOOD PRESSURE: 83 MMHG | TEMPERATURE: 96.6 F | HEART RATE: 74 BPM | SYSTOLIC BLOOD PRESSURE: 125 MMHG | WEIGHT: 209.1 LBS

## 2025-07-03 DIAGNOSIS — L90.5 AXILLARY WEB SYNDROME: Primary | ICD-10-CM

## 2025-07-03 DIAGNOSIS — C50.912 MALIGNANT NEOPLASM OF LEFT BREAST IN FEMALE, ESTROGEN RECEPTOR POSITIVE, UNSPECIFIED SITE OF BREAST: Primary | ICD-10-CM

## 2025-07-03 DIAGNOSIS — Z17.0 MALIGNANT NEOPLASM OF LEFT BREAST IN FEMALE, ESTROGEN RECEPTOR POSITIVE, UNSPECIFIED SITE OF BREAST: Primary | ICD-10-CM

## 2025-07-03 DIAGNOSIS — C50.912 CHEST WALL RECURRENCE OF BREAST CANCER, LEFT: ICD-10-CM

## 2025-07-03 DIAGNOSIS — C50.812 MALIGNANT NEOPLASM OF OVERLAPPING SITES OF LEFT BREAST IN FEMALE, ESTROGEN RECEPTOR POSITIVE: ICD-10-CM

## 2025-07-03 DIAGNOSIS — L76.82 AXILLARY WEB SYNDROME: Primary | ICD-10-CM

## 2025-07-03 DIAGNOSIS — Z17.0 MALIGNANT NEOPLASM OF OVERLAPPING SITES OF LEFT BREAST IN FEMALE, ESTROGEN RECEPTOR POSITIVE: ICD-10-CM

## 2025-07-03 DIAGNOSIS — C79.89 CHEST WALL RECURRENCE OF BREAST CANCER, LEFT: ICD-10-CM

## 2025-07-03 DIAGNOSIS — R92.8 ABNORMAL FINDING ON BREAST IMAGING: ICD-10-CM

## 2025-07-03 DIAGNOSIS — M79.89 LEFT ARM SWELLING: ICD-10-CM

## 2025-07-03 PROCEDURE — 99213 OFFICE O/P EST LOW 20 MIN: CPT

## 2025-07-03 PROCEDURE — 1036F TOBACCO NON-USER: CPT

## 2025-07-03 PROCEDURE — 97140 MANUAL THERAPY 1/> REGIONS: CPT | Mod: GP | Performed by: PHYSICAL THERAPIST

## 2025-07-03 ASSESSMENT — PAIN SCALES - GENERAL: PAINLEVEL_OUTOF10: 0-NO PAIN

## 2025-07-03 NOTE — PATIENT INSTRUCTIONS
Follow up in 1 year for clinical exam or sooner if indicated. You can see your health information, review clinical summaries from office visits & test results online when you follow your health with MY  Chart, a personal health record. To sign up go to [email or web address]. If you need assistance with signing up or trouble getting into your account call [facility] Patient Line 24/7 at [phone].    My office phone number is [phone] if you need to get in touch with me or have additional questions or concerns. Thank you for choosing [facility] and trusting me as your healthcare provider. I look forward to seeing you again at your next office visit. I am honored to be a provider on your health care team and I remain dedicated to helping you achieve your health goals.

## 2025-07-03 NOTE — PROGRESS NOTES
Physical Therapy Treatment    Patient Name:Kirsty Shrestha   MRN:[id number]   Today's Date:7/3/2025   Referred by: Kari Zhao   Time Calculation  Start Time: 0730  Stop Time: 0810  Time Calculation (min): 40 min       PT Therapeutic Procedures Time Entry  Manual Therapy Time Entry: 40       Insurance  Visit number: 2   Approved number of visits: 30  Auth required: No  Authorization date range:   Onset Date: 5/12/2025    Payor: [facility] / Plan: [employer] / Product Type: *No Product type* /     Assessment:  6/10/25: 36 yo female with hx of L breast cancer s/p B mastectomy and L SNLB 2022, s/p reconstruction. Recurrence of L breast cancer s/p lumpectomy 2024, pt states continued some tightness L breast likely due to decreased scar mobility L breast especially inferior T-shape scar, recommend scar massage for sx management. No breast swelling appreciated today, will continue to monitor.  Pt also has developed some pain and tightness in what appears to be cording L UE, continues with some residual cording noted antecubital area and ant upper arm, suspect this is correlated to her increased weight training for L UE likely too fast, sx has much improved at this time, pt is educated on self management of lymphedema and exercise principle, will work on STM and cording release to help with further sx management. Pt verbalized understanding.     7/3/25 at time of follow up pt is doing well, no longer has cording L UE, still has some decreased scar mobility L inf breast, reviewed with pt continued effort of self scar massage, L UE circumference stable, neg sign of L breast swelling, reviewed with pt self management, low and slow progression, pt verbalized understanding. Will continue with self management at this time and followup as needed.   Goals met.     Plan:  Follow up prn, if no follow up needed, consider this as discharge.     Precautions/Fall Risk:  Fall Risk: None based on professional judgment  Pacemaker:  no    Seizures: No    Post Op Movement/Restrictions: No:  Weight Bearing Status: As tolerated  Other Medical precautions:      OP PT Plan  PT Plan: Skilled PT  PT Frequency: 1 time per week  Duration: 8-10 weeks  Onset Date: 05/12/25  Number of Treatments Authorized: jordan 30V  Rehab Potential: Good  Plan of Care Agreement: Patient    Therapy Diagnosis   Problem List Items Addressed This Visit           ICD-10-CM       Hematology and Neoplasia    Malignant neoplasm of overlapping sites of left breast in female, estrogen receptor positive C50.812, Z17.0       Musculoskeletal and Injuries    Left arm swelling M79.89       Skin    Axillary web syndrome - Primary L76.82, L90.5       Current Problem  1. Axillary web syndrome  Follow Up In Physical Therapy      2. Left arm swelling  Follow Up In Physical Therapy      3. Malignant neoplasm of overlapping sites of left breast in female, estrogen receptor positive  Follow Up In Physical Therapy              Subjective/Pain:  Current Pain Level (0-10): 0  Pt states much improved, no longer feel cording and pulling sensation along L arm, still feels some tightness L breast region, doing self stretching and massage at home.     HEP Compliance: Good    General:  General  Reason for Referral: I89.0 (ICD-10-CM) - Lymphedema  M79.89 (ICD-10-CM) - Left arm swelling  C50.812,Z17.0 (ICD-10-CM) - Malignant neoplasm of overlapping sites of left breast in female, estrogen receptor positive  Referred By: Kari Zhao CNP  Preferred Learning Style: verbal, visual, written  Precautions:       Objective/Outcome Measures:  UE circumference measure R/L:      Date/R/L 6/10/25  7/3/25 L         Thumb IP 5.8/5.5  5.9         Thumb  CMC 6.0/6.4  6.3         Palm 17.8/17.5  17.4         Wrist  15.1/14.8  14.9         5cm above wrist 18.0/17.4  17.8         10cm above wrist 22.1/21.5  21.8         15cm above wrist 26.6/26.0  25.7         20cm above wrist 26.7/25.8  25.9         elbow  26.3/24.5  24.8         5cm above elbow 29.8/28.8  28.8         10cm above elbow 31.7/30.2  29.8         15cm above elbow 36.4/32.5  32.8         Axilla 38.4/36.5  36.2         Length                 ROM: R/L WNL B UE  Shoulder flex         abd             IR              ER  Elbow:  Wrist:     STRENGTH: R/L WNL B UE  Shoulder:  Elbow:  Wrist     Sensation: intact to light touch     Skin appearance/condition:   L breast inf incision with decreased scar mobility, slight numb to palpation, slight keloid, sup incision with decreased scar mobility, R breast inf incision with good scar mobility  Neg sign of swelling L breast today  L arm neg cording today,  no other skin changes    LEFS 0%    Outcome Measures:  Other Measures  Lymphedema Life Impact Scale (LLIS): 0    Treatments:     PT Therapeutic Procedures Time Entry  Manual Therapy Time Entry: 40 minutes  Time Calculation  Start Time: 0730  Stop Time: 0810  Time Calculation (min): 40 min       PT Therapeutic Procedures Time Entry  Manual Therapy Time Entry: 40     Therapeutic exercises:      Manual Therapy 40 minutes  Measurement and recheck  Scar massage L inf breast  STM along L antecubital region and inner arm  Discussed with pt further POC, to continue to normal activities, caution of low and slow progression for weight training and change of activities, continue with scar massage, pt verbalized understanding.          Education/Resources provided today: Home Program   [facility]:    OP EDUCATION:  Outpatient Education  Individual(s) Educated: Patient  Education Provided: Home Exercise Program, Lymphedema care, POC  Patient/Caregiver Demonstrated Understanding: yes  Plan of Care Discussed and Agreed Upon: yes  Patient Response to Education: Patient/Caregiver Verbalized Understanding of Information    Goals:  Patient's Goal for Treatment: Reduce symptoms and Return to prior level of function     To be met at time of discharge:--met  --no increased girth measurement  of B UE.  -- Improve skin/tissue no longer has residual cording L UE.   -- Knowledgeable and compliant with home program, including lymphedema management and exercises.   -- Improve functional outcome on LLIS to <20%,

## 2025-07-16 ENCOUNTER — INFUSION (OUTPATIENT)
Dept: HEMATOLOGY/ONCOLOGY | Facility: HOSPITAL | Age: 35
End: 2025-07-16
Payer: COMMERCIAL

## 2025-07-16 ENCOUNTER — APPOINTMENT (OUTPATIENT)
Dept: HEMATOLOGY/ONCOLOGY | Facility: HOSPITAL | Age: 35
End: 2025-07-16
Payer: COMMERCIAL

## 2025-07-16 ENCOUNTER — APPOINTMENT (OUTPATIENT)
Dept: PRIMARY CARE | Facility: CLINIC | Age: 35
End: 2025-07-16
Payer: COMMERCIAL

## 2025-07-16 VITALS
DIASTOLIC BLOOD PRESSURE: 84 MMHG | WEIGHT: 208.4 LBS | TEMPERATURE: 96.9 F | OXYGEN SATURATION: 97 % | SYSTOLIC BLOOD PRESSURE: 126 MMHG | BODY MASS INDEX: 38.11 KG/M2 | HEART RATE: 89 BPM

## 2025-07-16 DIAGNOSIS — C50.812 MALIGNANT NEOPLASM OF OVERLAPPING SITES OF LEFT BREAST IN FEMALE, ESTROGEN RECEPTOR POSITIVE: ICD-10-CM

## 2025-07-16 DIAGNOSIS — R21 RASH: Primary | ICD-10-CM

## 2025-07-16 DIAGNOSIS — Z17.0 MALIGNANT NEOPLASM OF OVERLAPPING SITES OF LEFT BREAST IN FEMALE, ESTROGEN RECEPTOR POSITIVE: ICD-10-CM

## 2025-07-16 DIAGNOSIS — C50.819 MALIGNANT NEOPLASM OF OVERLAPPING SITES OF BREAST IN FEMALE, ESTROGEN RECEPTOR POSITIVE, UNSPECIFIED LATERALITY: ICD-10-CM

## 2025-07-16 DIAGNOSIS — Z17.0 MALIGNANT NEOPLASM OF OVERLAPPING SITES OF BREAST IN FEMALE, ESTROGEN RECEPTOR POSITIVE, UNSPECIFIED LATERALITY: ICD-10-CM

## 2025-07-16 PROCEDURE — 96402 CHEMO HORMON ANTINEOPL SQ/IM: CPT

## 2025-07-16 PROCEDURE — 1036F TOBACCO NON-USER: CPT | Performed by: STUDENT IN AN ORGANIZED HEALTH CARE EDUCATION/TRAINING PROGRAM

## 2025-07-16 PROCEDURE — 99214 OFFICE O/P EST MOD 30 MIN: CPT | Performed by: STUDENT IN AN ORGANIZED HEALTH CARE EDUCATION/TRAINING PROGRAM

## 2025-07-16 PROCEDURE — 2500000004 HC RX 250 GENERAL PHARMACY W/ HCPCS (ALT 636 FOR OP/ED): Mod: JZ,SE | Performed by: INTERNAL MEDICINE

## 2025-07-16 RX ORDER — TRIAMCINOLONE ACETONIDE 1 MG/G
CREAM TOPICAL 2 TIMES DAILY
Qty: 30 G | Refills: 0 | Status: SHIPPED | OUTPATIENT
Start: 2025-07-16

## 2025-07-16 RX ORDER — ALBUTEROL SULFATE 0.83 MG/ML
3 SOLUTION RESPIRATORY (INHALATION) AS NEEDED
OUTPATIENT
Start: 2025-08-20

## 2025-07-16 RX ORDER — EPINEPHRINE 0.3 MG/.3ML
0.3 INJECTION SUBCUTANEOUS EVERY 5 MIN PRN
Status: DISCONTINUED | OUTPATIENT
Start: 2025-07-16 | End: 2025-07-16 | Stop reason: HOSPADM

## 2025-07-16 RX ORDER — TRIAMCINOLONE ACETONIDE 1 MG/G
CREAM TOPICAL 2 TIMES DAILY
Qty: 30 G | Refills: 0 | Status: SHIPPED | OUTPATIENT
Start: 2025-07-16 | End: 2025-07-16

## 2025-07-16 RX ORDER — EPINEPHRINE 0.3 MG/.3ML
0.3 INJECTION SUBCUTANEOUS EVERY 5 MIN PRN
OUTPATIENT
Start: 2025-08-20

## 2025-07-16 RX ORDER — ALBUTEROL SULFATE 0.83 MG/ML
3 SOLUTION RESPIRATORY (INHALATION) AS NEEDED
Status: DISCONTINUED | OUTPATIENT
Start: 2025-07-16 | End: 2025-07-16 | Stop reason: HOSPADM

## 2025-07-16 RX ORDER — FAMOTIDINE 10 MG/ML
20 INJECTION, SOLUTION INTRAVENOUS ONCE AS NEEDED
OUTPATIENT
Start: 2025-08-20

## 2025-07-16 RX ORDER — FAMOTIDINE 10 MG/ML
20 INJECTION, SOLUTION INTRAVENOUS ONCE AS NEEDED
Status: DISCONTINUED | OUTPATIENT
Start: 2025-07-16 | End: 2025-07-16 | Stop reason: HOSPADM

## 2025-07-16 RX ORDER — DIPHENHYDRAMINE HYDROCHLORIDE 50 MG/ML
50 INJECTION, SOLUTION INTRAMUSCULAR; INTRAVENOUS AS NEEDED
OUTPATIENT
Start: 2025-08-20

## 2025-07-16 RX ORDER — DIPHENHYDRAMINE HYDROCHLORIDE 50 MG/ML
50 INJECTION, SOLUTION INTRAMUSCULAR; INTRAVENOUS AS NEEDED
Status: DISCONTINUED | OUTPATIENT
Start: 2025-07-16 | End: 2025-07-16 | Stop reason: HOSPADM

## 2025-07-16 RX ADMIN — LEUPROLIDE ACETATE 11.25 MG: KIT at 16:00

## 2025-07-16 ASSESSMENT — ENCOUNTER SYMPTOMS
LOSS OF SENSATION IN FEET: 0
OCCASIONAL FEELINGS OF UNSTEADINESS: 0
DEPRESSION: 0

## 2025-07-16 NOTE — PROGRESS NOTES
Pt arrived ambulatory to infusion for treatment of lupron.  Denies any new or worsening symptoms. Tolerated injection in R ventrogluteal without issue. Discharged in stable condition.

## 2025-07-16 NOTE — PROGRESS NOTES
[facility] - [facility]  [address], [address]  [place] [address]    Subjective     Patient ID: Kirsty Shrestha is a 35 y.o. female who presents for  Psoriasis (Pt is breaking out lips ,arms)     Having some intermittent rash. On the upper arms and around the lips. No new topical exposures. No new work exposures. No new self care products. She started a new medication in may. These symptoms have been intermittent for about 1 month. They are itchy and flaky.        Review of Systems   All other systems reviewed and are negative.      Social History[1]  Family History[2]  RX Allergies[3]   Current Medications[4]    /84 (BP Location: Right arm, Patient Position: Sitting, BP Cuff Size: Adult)   Pulse 89   Temp 36.1 °C (96.9 °F) (Temporal)   Wt 94.5 kg (208 lb 6.4 oz)   SpO2 97%   BMI 38.11 kg/m²      Objective   Physical Exam    Skin:     Findings: Rash present.      Comments: Multiple circular to oval red lesions on the inner upper arm in various stages of improvement. Having some dryness around the vermis of the lips.            Labs:   Lab Results   Component Value Date    WBC 7.0 11/09/2024    HGB 12.2 11/09/2024    HCT 35.9 (L) 11/09/2024     11/09/2024    TSH 0.26 (L) 07/15/2024    INR 1.0 05/19/2023     Lab Results   Component Value Date     11/09/2024    K 3.5 11/09/2024     (H) 11/09/2024    BUN 11 11/09/2024    CREATININE 0.85 11/09/2024    GLUCOSE 105 (H) 11/09/2024    CALCIUM 8.6 11/09/2024    PROT 7.2 11/09/2024    BILITOT 0.3 11/09/2024    ALKPHOS 76 11/09/2024    AST 20 11/09/2024    ALT 18 11/09/2024     Lab Results   Component Value Date    CHOL 198 07/15/2024    CHOL 175 10/17/2023      Lab Results   Component Value Date    TRIG 103 10/17/2023      Lab Results   Component Value Date    HDL 44.7 07/15/2024    HDL 54.0 10/17/2023     Lab Results   Component Value Date    LDLCALC 100 (H) 10/17/2023      Lab Results   Component Value Date    VLDL 21  "10/17/2023    No components found for: \"CHOLHDLRATI0\"    No data recorded    Imaging/Testing: BI US breast limited left  Narrative: Interpreted By:  Emelia Malagon,   STUDY:  BI US BREAST LIMITED LEFT;  6/30/2025 3:28 pm      ACCESSION NUMBER(S):  [id number]      ORDERING CLINICIAN:  TJ KAM      INDICATION:  Patient presents for a 3 month short-term follow-up of a probably  benign area of probable developing fat necrosis at the lumpectomy bed.      ,R92.8 Other abnormal and inconclusive findings on diagnostic imaging  of breast      COMPARISON:  03/28/2025      FINDINGS:      ULTRASOUND: Targeted ultrasound was performed of the follow-up left  breast at the 11 o'clock position with elastography. The previously  noted probable developing fat necrosis is no longer seen. There are  no suspicious masses or suspicious areas of acoustic shadowing. The  tissue is soft with elastography.      Impression: Targeted ultrasound with no suspicious findings. The previous area  for follow-up is no longer seen. Clinical follow-up is recommended.      BI-RADS CATEGORY:  BI-RADS Category:  1 Negative.  Recommendation:  Clinical follow-up.  Recommended Date:  Immediate.  Laterality:  Left.      For any future breast imaging appointments, please call [phone] ([phone]).          MACRO:  None      Signed by: Emelia Malagon 7/1/2025 9:44 AM  Dictation workstation:   [id number]    Assessment/Plan   Problem List Items Addressed This Visit    None  Visit Diagnoses         Rash    -  Primary    Relevant Medications    triamcinolone (Kenalog) 0.1 % cream          Rash  - DDX dermatitis vs drug eruption  - try steroid cream  - advised oral steroid but pt refused  - if no improvement can see derm, practice contact info given  - may need to consider changing meds if no improvement, her sx coincide with initiation of the new medication    Note that due to e-prescribing issue, her steroid cream may appear multiple times in this " visit and may appear as reordered multiple times. Rx was prescribed verbally to the pharmacy.    orders and follow up as documented in EMR, reviewed medications and side effects in detail     Return if problem recurs,  worsens, or new problem develops.         ROMAINE CARRASCO MD, [facility]  Department of [facility] - [facility]         [1]   Social History  Tobacco Use    Smoking status: Never     Passive exposure: Never    Smokeless tobacco: Never   Vaping Use    Vaping status: Never Used   Substance Use Topics    Alcohol use: Yes     Comment: Socially    Drug use: Never   [2] No family history on file.  [3] No Known Allergies  [4]   Current Outpatient Medications   Medication Sig Dispense Refill    exemestane (Aromasin) 25 mg tablet Take 1 tablet (25 mg total) by mouth once daily.  Take after a meal.  Try to take at the same time each day. 30 tablet 5    fexofenadine-pseudoephedrine (Allegra-D 24) 180-240 mg 24 hr tablet Take 1 tablet by mouth once daily. Do not crush, chew, or split.      levonorgestrel (Mirena) 21 mcg/24 hours (8 yrs) 52 mg IUD 52 mg by intrauterine route once daily.      tamoxifen (Nolvadex) 20 mg tablet Take 1 tablet (20 mg total) by mouth once daily.      triamcinolone (Kenalog) 0.1 % cream Apply topically 2 times a day. Apply to affected area 1-2 times daily as needed. Avoid face and groin. 30 g 0     No current facility-administered medications for this visit.

## 2025-07-16 NOTE — PATIENT INSTRUCTIONS
[facility] [place] [place]  [address] [place] [address]  Office Phone Number:  Phone: [phone]  Fax: [phone]  [facility] Phone: [phone]  Hours Open:  Monday: 8:00am - 4:30pm  Tuesday: 8:00am - 5:00pm  Wednesday: 8:00am - 4:30pm  Thursday: 8:00am - 5:00pm  Friday: 8:00am - 4:30pm  Saturday: *Some Saturdays available, by appointment only  Sunday: Closed    [facility]:    [place] [facility]  [address]  [place], [place] [address]  Phone: [phone]    [facility]  (Opens July 14th 2025)  [address]  [address]  [place], [place] [address]  Phone: [phone]    [place] - [facility]  [address]  [place], [place] [address]  Phone: [phone]    [facility] - Chelsea Barnes MD  [address] [place] [address]    Contacts:  Phone: [phone]  Fax: [phone]    Hours:  Mon, Tues, Thur: 8 am - 5 pm  Wed: 8 am - 1 pm  Fri: 7 am - 2 pm    [facility]  Dr Mariposa Moss MD and Dr Jovani Mcdonough MD  [address], [place] [address]    Phone: [phone]  Fax: [phone]    Mon - Thur 8:30 to 4:30  pm    Friday 8:30 am to 12:30 pm    Mohs Surgery Hours 8:30 to 3:00 pm    You can call to schedule at any of these practices, you do not need a referral. If they request a referral you can call our office and we will fax once over to them - please make sure to state which practice location/doctor we should send the referral to.

## 2025-08-20 DIAGNOSIS — Z17.0 MALIGNANT NEOPLASM OF OVERLAPPING SITES OF BREAST IN FEMALE, ESTROGEN RECEPTOR POSITIVE, UNSPECIFIED LATERALITY: Primary | ICD-10-CM

## 2025-08-20 DIAGNOSIS — C50.819 MALIGNANT NEOPLASM OF OVERLAPPING SITES OF BREAST IN FEMALE, ESTROGEN RECEPTOR POSITIVE, UNSPECIFIED LATERALITY: Primary | ICD-10-CM

## 2025-08-21 ENCOUNTER — HOSPITAL ENCOUNTER (OUTPATIENT)
Dept: RADIOLOGY | Facility: HOSPITAL | Age: 35
Discharge: HOME | End: 2025-08-21
Payer: COMMERCIAL

## 2025-08-21 ENCOUNTER — APPOINTMENT (OUTPATIENT)
Dept: RADIOLOGY | Facility: HOSPITAL | Age: 35
End: 2025-08-21
Payer: COMMERCIAL

## 2025-08-21 DIAGNOSIS — Z17.0 MALIGNANT NEOPLASM OF OVERLAPPING SITES OF BREAST IN FEMALE, ESTROGEN RECEPTOR POSITIVE, UNSPECIFIED LATERALITY: ICD-10-CM

## 2025-08-21 DIAGNOSIS — C50.819 MALIGNANT NEOPLASM OF OVERLAPPING SITES OF BREAST IN FEMALE, ESTROGEN RECEPTOR POSITIVE, UNSPECIFIED LATERALITY: ICD-10-CM

## 2025-08-21 PROCEDURE — 76982 USE 1ST TARGET LESION: CPT | Mod: LT

## 2025-08-21 PROCEDURE — 76641 ULTRASOUND BREAST COMPLETE: CPT | Mod: LT

## 2025-08-21 PROCEDURE — 76642 ULTRASOUND BREAST LIMITED: CPT | Mod: LEFT SIDE | Performed by: STUDENT IN AN ORGANIZED HEALTH CARE EDUCATION/TRAINING PROGRAM

## 2025-08-22 ENCOUNTER — OFFICE VISIT (OUTPATIENT)
Dept: HEMATOLOGY/ONCOLOGY | Facility: HOSPITAL | Age: 35
End: 2025-08-22
Payer: COMMERCIAL

## 2025-08-22 VITALS
HEIGHT: 62 IN | TEMPERATURE: 97.9 F | DIASTOLIC BLOOD PRESSURE: 78 MMHG | BODY MASS INDEX: 35.99 KG/M2 | WEIGHT: 195.55 LBS | SYSTOLIC BLOOD PRESSURE: 129 MMHG | HEART RATE: 64 BPM | RESPIRATION RATE: 17 BRPM | OXYGEN SATURATION: 98 %

## 2025-08-22 DIAGNOSIS — I89.0 LYMPHEDEMA OF BREAST: Primary | ICD-10-CM

## 2025-08-22 DIAGNOSIS — Z17.0 MALIGNANT NEOPLASM OF OVERLAPPING SITES OF LEFT BREAST IN FEMALE, ESTROGEN RECEPTOR POSITIVE: ICD-10-CM

## 2025-08-22 DIAGNOSIS — C50.812 MALIGNANT NEOPLASM OF OVERLAPPING SITES OF LEFT BREAST IN FEMALE, ESTROGEN RECEPTOR POSITIVE: ICD-10-CM

## 2025-08-22 DIAGNOSIS — N61.0 CELLULITIS OF LEFT BREAST: ICD-10-CM

## 2025-08-22 PROCEDURE — 3008F BODY MASS INDEX DOCD: CPT

## 2025-08-22 PROCEDURE — 99215 OFFICE O/P EST HI 40 MIN: CPT

## 2025-08-22 RX ORDER — DOXYCYCLINE 100 MG/1
100 CAPSULE ORAL 2 TIMES DAILY
Qty: 20 CAPSULE | Refills: 0 | Status: SHIPPED | OUTPATIENT
Start: 2025-08-22 | End: 2025-09-01

## 2025-08-22 ASSESSMENT — PAIN SCALES - GENERAL: PAINLEVEL_OUTOF10: 6

## 2025-08-28 ENCOUNTER — OFFICE VISIT (OUTPATIENT)
Dept: PLASTIC SURGERY | Facility: CLINIC | Age: 35
End: 2025-08-28
Payer: COMMERCIAL

## 2025-08-28 VITALS — BODY MASS INDEX: 35.88 KG/M2 | WEIGHT: 195 LBS | HEIGHT: 62 IN

## 2025-08-28 DIAGNOSIS — T85.44XA CAPSULAR CONTRACTURE OF BREAST IMPLANT, INITIAL ENCOUNTER: Primary | ICD-10-CM

## 2025-08-28 DIAGNOSIS — L53.9 BREAST ERYTHEMA: ICD-10-CM

## 2025-08-28 DIAGNOSIS — Z98.890 S/P BREAST RECONSTRUCTION, BILATERAL: ICD-10-CM

## 2025-08-28 PROCEDURE — 3008F BODY MASS INDEX DOCD: CPT | Performed by: PHYSICIAN ASSISTANT

## 2025-08-28 PROCEDURE — 99214 OFFICE O/P EST MOD 30 MIN: CPT | Performed by: PHYSICIAN ASSISTANT

## 2025-08-28 PROCEDURE — 1036F TOBACCO NON-USER: CPT | Performed by: PHYSICIAN ASSISTANT

## 2025-08-28 RX ORDER — MONTELUKAST SODIUM 10 MG/1
10 TABLET ORAL DAILY
Qty: 90 TABLET | Refills: 0 | Status: SHIPPED | OUTPATIENT
Start: 2025-08-28 | End: 2025-11-26

## 2025-09-05 ENCOUNTER — EVALUATION (OUTPATIENT)
Dept: PHYSICAL THERAPY | Facility: HOSPITAL | Age: 35
End: 2025-09-05
Payer: COMMERCIAL

## 2025-09-05 DIAGNOSIS — C50.812 MALIGNANT NEOPLASM OF OVERLAPPING SITES OF LEFT BREAST IN FEMALE, ESTROGEN RECEPTOR POSITIVE: ICD-10-CM

## 2025-09-05 DIAGNOSIS — Z17.0 MALIGNANT NEOPLASM OF OVERLAPPING SITES OF LEFT BREAST IN FEMALE, ESTROGEN RECEPTOR POSITIVE: ICD-10-CM

## 2025-09-05 DIAGNOSIS — I89.0 LYMPHEDEMA: Primary | ICD-10-CM

## 2025-09-05 PROCEDURE — 97140 MANUAL THERAPY 1/> REGIONS: CPT | Mod: GP | Performed by: PHYSICAL THERAPIST

## 2025-09-05 PROCEDURE — 97161 PT EVAL LOW COMPLEX 20 MIN: CPT | Mod: GP | Performed by: PHYSICAL THERAPIST

## 2025-09-16 ENCOUNTER — APPOINTMENT (OUTPATIENT)
Dept: PRIMARY CARE | Facility: CLINIC | Age: 35
End: 2025-09-16
Payer: COMMERCIAL

## 2025-09-17 ENCOUNTER — APPOINTMENT (OUTPATIENT)
Facility: CLINIC | Age: 35
End: 2025-09-17
Payer: COMMERCIAL

## 2025-10-20 ENCOUNTER — APPOINTMENT (OUTPATIENT)
Facility: CLINIC | Age: 35
End: 2025-10-20
Payer: COMMERCIAL

## (undated) DEVICE — CLIP, LIGATING, LIGACLIP EXTRA, SMALL, 26 MM, TITANIUM

## (undated) DEVICE — SUTURE, VICRYL, 3-0, 27 IN, SH

## (undated) DEVICE — STAPLER, SKIN, PLUS, REGULAR, 35

## (undated) DEVICE — DRESSING, GAUZE, SPONGE, KERLIX, SUPER, 6 X 6.75 IN, STERILE 10PK

## (undated) DEVICE — KIT, MARGINMARKER, 6 INK COLORS, STANDARD

## (undated) DEVICE — Device

## (undated) DEVICE — GLOVE, SURGICAL, PROTEXIS PI , 6.5, PF, LF

## (undated) DEVICE — GOWN, ASTOUND, XL

## (undated) DEVICE — PROBE COVER, INTRAOPERATIVE, 13 X 244CM (5 X 96IN)

## (undated) DEVICE — GOWN, ASTOUND, L

## (undated) DEVICE — LUBRICANT, SURGILUBE, STERILE, 2OZ

## (undated) DEVICE — APPLICATOR, CHLORAPREP, W/ORANGE TINT, 26ML

## (undated) DEVICE — SUTURE, PDS II, 5-0, 18 IN, P3, CLEAR

## (undated) DEVICE — SUTURE, MONOCRYL, 4-0, 18 IN, PS2, UNDYED

## (undated) DEVICE — PACK, BASIC

## (undated) DEVICE — EVACUATOR, WOUND, SUCTION, CLOSED, JACKSON-PRATT, 100 CC, SILICONE

## (undated) DEVICE — STAPLER, SKIN, DISPOSABLE, 35 COUNT, WIDE, STERILE

## (undated) DEVICE — STRIP, SKIN CLOSURE, STERI STRIP, REINFORCED, 0.5 X 4 IN

## (undated) DEVICE — IRRIGATION KIT, PUMPING, SINGLE ACTION, SYRINGE, 10 CC

## (undated) DEVICE — ADHESIVE, SKIN, MASTISOL, 2/3 CC VIAL

## (undated) DEVICE — SUTURE, MONOCRYL, 4-0, 27 IN, PS-2, UNDYED

## (undated) DEVICE — DRESSING, GAUZE, DRAIN SPONGE, 6 PLY, EXCILON, 4 X 4 IN, STERILE

## (undated) DEVICE — COVER, TRANSDUCER, NEO GUARD, 2.6 X 30CM, LF

## (undated) DEVICE — BLADE, SURGICAL, POLYMER COATED P15, STERILE, DISP

## (undated) DEVICE — SUTURE, SILK, 2-0, 30 IN, SH, BLACK

## (undated) DEVICE — GLOVE, SURGICAL, PROTEXIS PI BLUE W/NEUTHERA, 6.5, PF, LF

## (undated) DEVICE — ELECTRODE, ELECTROSURGICAL, BLADE, INSULATED, ENT/IMA, STERILE

## (undated) DEVICE — THUNDERBEAT, 0MM, 9CM, OPEN FINE JAW

## (undated) DEVICE — TISSUE ADHESIVE, EXOFIN, 1ML

## (undated) DEVICE — GLOVE, SURGICAL, PROTEGRITY, NEU-THERA, 8.0, PF, LATEX

## (undated) DEVICE — SYRINGE, 60 CC, IRRIGATION, BULB, CONTRO-BULB, PAPER POUCH

## (undated) DEVICE — BLADE, PLASMA, PEAK, 3.0S LIGHT

## (undated) DEVICE — DRAPE, LEGGINGS, 48 X 31 IN, STERILE, LF

## (undated) DEVICE — BANDAGE, ELASTIC, REINFORCED, ECONO-WRAP, 6 IN X 4.5 YD, LATEX

## (undated) DEVICE — MARKER, SKIN, REGULAR TIP, W/W/FLEXI RULER, LABEL

## (undated) DEVICE — SYRINGE, 3 CC, LUER LOCK, W/NEEDLE, 23 G X 1 IN

## (undated) DEVICE — SOLUTION, IRRIGATION, X RX SODIUM CHL 0.9%, 1000ML BTL

## (undated) DEVICE — GLOVE, SURGICAL, PROTEXIS PI , 7.5, PF, LF

## (undated) DEVICE — DRESSING, NON-ADHERENT, TELFA, 2 X 3 IN, STERILE

## (undated) DEVICE — SYRINGE, 20 CC, LUER LOCK

## (undated) DEVICE — SUTURE, SILK, 0, 30 IN, CT-1, BLACK

## (undated) DEVICE — SPONGE, LAP, XRAY DECT, 18IN X 18IN, W/MASTER DMT, STERILE

## (undated) DEVICE — BANDAGE, ELASTIC, REINFORCED, ECONO-WRAP, 4 IN X 4.5 YD, LATEX

## (undated) DEVICE — BLADE, SURGICAL, POLYMER COATED P10, STERILE, DISP

## (undated) DEVICE — SUTURE, PDS II, 2-0, 27 IN, SH, VIOLET

## (undated) DEVICE — 30MM X 135MM ONETRAC LX CORDLESS RETRACTOR, W/INTEGRATED MULTI-LED

## (undated) DEVICE — WOUND SYSTEM, DEBRIDEMENT & CLEANING, O.R DUOPAK

## (undated) DEVICE — TOWEL, SURGICAL, NEURO, O/R, 16 X 26, BLUE, STERILE

## (undated) DEVICE — TIP, SUCTION, YANKAUER, BULB, ADULT

## (undated) DEVICE — SUTURE, PLAIN GUT, 4-0, RB-1, 27"

## (undated) DEVICE — COVER, PLASTIC, MAYO STAND, 29.5IN X 55.5IN

## (undated) DEVICE — CAUTERY, PENCIL, PUSH BUTTON, SMOKE EVAC, 70MM

## (undated) DEVICE — DRESSING, ABDOMINAL PAD, CURITY, 8 X 10 IN

## (undated) DEVICE — NEEDLE, FILTER 19 G X 1 IN

## (undated) DEVICE — SYRINGE, 10 CC, LUER LOCK

## (undated) DEVICE — BASIN, EMESIS, STANDARD, STERILE

## (undated) DEVICE — MARKER, SKIN, RULER AND LABEL PACK, CUSTOM

## (undated) DEVICE — SUTURE, VICRYL, 3-0, 27 IN, SH, VIOLET

## (undated) DEVICE — SUTURE, SILK, 3-0, 30 IN, BR SH, BLACK